# Patient Record
Sex: FEMALE | Race: WHITE | NOT HISPANIC OR LATINO | Employment: FULL TIME
[De-identification: names, ages, dates, MRNs, and addresses within clinical notes are randomized per-mention and may not be internally consistent; named-entity substitution may affect disease eponyms.]

---

## 2017-05-03 ENCOUNTER — CHARTING TRANS (OUTPATIENT)
Dept: OTHER | Age: 62
End: 2017-05-03

## 2017-05-03 ASSESSMENT — PAIN SCALES - GENERAL: PAINLEVEL_OUTOF10: 0

## 2017-12-09 ENCOUNTER — HOSPITAL (OUTPATIENT)
Dept: OTHER | Age: 62
End: 2017-12-09
Attending: EMERGENCY MEDICINE

## 2018-11-03 VITALS
RESPIRATION RATE: 16 BRPM | OXYGEN SATURATION: 99 % | WEIGHT: 127.43 LBS | BODY MASS INDEX: 21.75 KG/M2 | TEMPERATURE: 98.5 F | HEART RATE: 76 BPM | HEIGHT: 64 IN

## 2020-01-01 ENCOUNTER — EXTERNAL RECORD (OUTPATIENT)
Dept: RADIATION ONCOLOGY | Age: 65
End: 2020-01-01

## 2020-01-01 ENCOUNTER — EXTERNAL RECORD (OUTPATIENT)
Dept: INFUSION THERAPY | Age: 65
End: 2020-01-01

## 2020-02-29 ENCOUNTER — WALK IN (OUTPATIENT)
Dept: URGENT CARE | Age: 65
End: 2020-02-29
Attending: EMERGENCY MEDICINE

## 2020-02-29 VITALS
WEIGHT: 136.69 LBS | OXYGEN SATURATION: 98 % | TEMPERATURE: 98.1 F | SYSTOLIC BLOOD PRESSURE: 103 MMHG | RESPIRATION RATE: 16 BRPM | BODY MASS INDEX: 23.46 KG/M2 | HEART RATE: 74 BPM | DIASTOLIC BLOOD PRESSURE: 61 MMHG

## 2020-02-29 DIAGNOSIS — R42 DIZZINESS, NONSPECIFIC: ICD-10-CM

## 2020-02-29 DIAGNOSIS — R50.9 FEVER, UNSPECIFIED FEVER CAUSE: Primary | ICD-10-CM

## 2020-02-29 LAB
FLUAV AG UPPER RESP QL IA.RAPID: NEGATIVE
FLUBV AG UPPER RESP QL IA.RAPID: NEGATIVE

## 2020-02-29 PROCEDURE — 99212 OFFICE O/P EST SF 10 MIN: CPT

## 2020-02-29 PROCEDURE — 87804 INFLUENZA ASSAY W/OPTIC: CPT | Performed by: EMERGENCY MEDICINE

## 2020-02-29 RX ORDER — MECLIZINE HYDROCHLORIDE 25 MG/1
25 TABLET ORAL 3 TIMES DAILY PRN
Qty: 15 TABLET | Refills: 0 | Status: SHIPPED | OUTPATIENT
Start: 2020-02-29 | End: 2020-03-05

## 2020-02-29 SDOH — HEALTH STABILITY: MENTAL HEALTH: HOW OFTEN DO YOU HAVE A DRINK CONTAINING ALCOHOL?: MONTHLY OR LESS

## 2020-06-01 ENCOUNTER — EMPLOYEE HEALTH (OUTPATIENT)
Dept: OTHER | Age: 65
End: 2020-06-01

## 2020-06-01 DIAGNOSIS — Z20.822 SUSPECTED COVID-19 VIRUS INFECTION: Primary | ICD-10-CM

## 2020-06-02 ENCOUNTER — LAB SERVICES (OUTPATIENT)
Dept: LAB | Age: 65
End: 2020-06-02

## 2020-06-02 DIAGNOSIS — Z20.822 SUSPECTED COVID-19 VIRUS INFECTION: ICD-10-CM

## 2020-06-02 PROCEDURE — 87635 SARS-COV-2 COVID-19 AMP PRB: CPT | Performed by: HOSPITALIST

## 2020-06-03 ENCOUNTER — EMPLOYEE HEALTH (OUTPATIENT)
Dept: OTHER | Age: 65
End: 2020-06-03

## 2020-06-03 LAB
SARS-COV-2 RNA SPEC QL NAA+PROBE: NOT DETECTED
SERVICE CMNT-IMP: NORMAL
SPECIMEN SOURCE: NORMAL

## 2020-06-04 ENCOUNTER — EMPLOYEE HEALTH (OUTPATIENT)
Dept: OTHER | Age: 65
End: 2020-06-04

## 2020-07-10 ENCOUNTER — EMPLOYEE HEALTH (OUTPATIENT)
Dept: OTHER | Age: 65
End: 2020-07-10

## 2020-07-10 DIAGNOSIS — Z20.822 SUSPECTED COVID-19 VIRUS INFECTION: Primary | ICD-10-CM

## 2020-07-11 ENCOUNTER — LAB SERVICES (OUTPATIENT)
Dept: LAB | Age: 65
End: 2020-07-11

## 2020-07-11 DIAGNOSIS — Z20.822 SUSPECTED COVID-19 VIRUS INFECTION: ICD-10-CM

## 2020-07-11 PROCEDURE — 87635 SARS-COV-2 COVID-19 AMP PRB: CPT | Performed by: HOSPITALIST

## 2020-07-12 LAB
SARS-COV-2 RNA RESP QL NAA+PROBE: DETECTED
SPECIMEN SOURCE: ABNORMAL

## 2020-07-13 ENCOUNTER — TELEPHONE (OUTPATIENT)
Dept: SCHEDULING | Age: 65
End: 2020-07-13

## 2020-07-13 ENCOUNTER — EMPLOYEE HEALTH (OUTPATIENT)
Dept: OTHER | Age: 65
End: 2020-07-13

## 2020-07-13 ENCOUNTER — V-VISIT (OUTPATIENT)
Dept: FAMILY MEDICINE | Age: 65
End: 2020-07-13

## 2020-07-13 DIAGNOSIS — U07.1 COVID-19 VIRUS INFECTION: Primary | ICD-10-CM

## 2020-07-13 PROBLEM — H53.9 MONOCULAR VISUAL DISTURBANCE: Status: ACTIVE | Noted: 2017-05-03

## 2020-07-13 PROCEDURE — 99213 OFFICE O/P EST LOW 20 MIN: CPT | Performed by: FAMILY MEDICINE

## 2020-07-13 SDOH — HEALTH STABILITY: MENTAL HEALTH: HOW OFTEN DO YOU HAVE A DRINK CONTAINING ALCOHOL?: MONTHLY OR LESS

## 2020-07-13 ASSESSMENT — PATIENT HEALTH QUESTIONNAIRE - PHQ9
SUM OF ALL RESPONSES TO PHQ9 QUESTIONS 1 AND 2: 0
CLINICAL INTERPRETATION OF PHQ9 SCORE: NO FURTHER SCREENING NEEDED
1. LITTLE INTEREST OR PLEASURE IN DOING THINGS: NOT AT ALL
CLINICAL INTERPRETATION OF PHQ2 SCORE: NO FURTHER SCREENING NEEDED
2. FEELING DOWN, DEPRESSED OR HOPELESS: NOT AT ALL
SUM OF ALL RESPONSES TO PHQ9 QUESTIONS 1 AND 2: 0

## 2020-07-13 ASSESSMENT — ENCOUNTER SYMPTOMS
COUGH: 1
ACTIVITY CHANGE: 1
FEVER: 1
DIZZINESS: 1
WEAKNESS: 1
FATIGUE: 1
WHEEZING: 0
EYES NEGATIVE: 1
CHILLS: 0
CHEST TIGHTNESS: 0
SHORTNESS OF BREATH: 0

## 2020-07-15 ENCOUNTER — EMPLOYEE HEALTH (OUTPATIENT)
Dept: OTHER | Age: 65
End: 2020-07-15

## 2020-07-21 ENCOUNTER — EMPLOYEE HEALTH (OUTPATIENT)
Dept: OTHER | Age: 65
End: 2020-07-21

## 2020-07-22 ENCOUNTER — TELEPHONE (OUTPATIENT)
Dept: FAMILY MEDICINE | Age: 65
End: 2020-07-22

## 2020-07-23 ENCOUNTER — OFFICE VISIT (OUTPATIENT)
Dept: FAMILY MEDICINE | Age: 65
End: 2020-07-23

## 2020-07-23 DIAGNOSIS — R51.9 ACUTE NONINTRACTABLE HEADACHE, UNSPECIFIED HEADACHE TYPE: ICD-10-CM

## 2020-07-23 DIAGNOSIS — R42 DIZZINESS: ICD-10-CM

## 2020-07-23 DIAGNOSIS — U07.1 COVID-19 VIRUS INFECTION: Primary | ICD-10-CM

## 2020-07-23 PROCEDURE — 99442 TELEPHONE E&M BY PHYSICIAN EST PT NOT ORIG PREV 7 DAYS 11-20 MIN: CPT | Performed by: FAMILY MEDICINE

## 2020-07-23 ASSESSMENT — ENCOUNTER SYMPTOMS
WEAKNESS: 0
SHORTNESS OF BREATH: 1
DIZZINESS: 1
CHILLS: 0
WHEEZING: 0
CHEST TIGHTNESS: 0
COUGH: 0
FEVER: 1
FATIGUE: 0
ACTIVITY CHANGE: 1
EYES NEGATIVE: 1

## 2020-07-23 ASSESSMENT — PAIN SCALES - GENERAL: PAINLEVEL: 0

## 2020-08-03 ENCOUNTER — TELEPHONE (OUTPATIENT)
Dept: SCHEDULING | Age: 65
End: 2020-08-03

## 2020-08-10 ENCOUNTER — TELEPHONE (OUTPATIENT)
Dept: SCHEDULING | Age: 65
End: 2020-08-10

## 2020-08-12 ENCOUNTER — OFFICE VISIT (OUTPATIENT)
Dept: FAMILY MEDICINE | Age: 65
End: 2020-08-12

## 2020-08-12 DIAGNOSIS — U07.1 COVID-19 VIRUS INFECTION: Primary | ICD-10-CM

## 2020-08-12 DIAGNOSIS — R42 DIZZINESS: ICD-10-CM

## 2020-08-12 PROCEDURE — 99213 OFFICE O/P EST LOW 20 MIN: CPT | Performed by: FAMILY MEDICINE

## 2020-08-12 ASSESSMENT — ENCOUNTER SYMPTOMS
COUGH: 0
FATIGUE: 0
CHEST TIGHTNESS: 0
WHEEZING: 0
SHORTNESS OF BREATH: 1
DIZZINESS: 1
ACTIVITY CHANGE: 1
FEVER: 1
CHILLS: 0
WEAKNESS: 0
EYES NEGATIVE: 1

## 2020-08-17 ENCOUNTER — TELEPHONE (OUTPATIENT)
Dept: SCHEDULING | Age: 65
End: 2020-08-17

## 2020-08-20 ENCOUNTER — TELEPHONE (OUTPATIENT)
Dept: SCHEDULING | Age: 65
End: 2020-08-20

## 2020-08-21 ENCOUNTER — OFFICE VISIT (OUTPATIENT)
Dept: FAMILY MEDICINE | Age: 65
End: 2020-08-21

## 2020-08-21 DIAGNOSIS — R93.89 ABNORMAL CXR (CHEST X-RAY): ICD-10-CM

## 2020-08-21 DIAGNOSIS — R91.8 LUNG MASS: ICD-10-CM

## 2020-08-21 DIAGNOSIS — R42 DIZZINESS: ICD-10-CM

## 2020-08-21 DIAGNOSIS — U07.1 COVID-19 VIRUS INFECTION: Primary | ICD-10-CM

## 2020-08-21 PROCEDURE — 99442 TELEPHONE E&M BY PHYSICIAN EST PT NOT ORIG PREV 7 DAYS 11-20 MIN: CPT | Performed by: FAMILY MEDICINE

## 2020-08-21 ASSESSMENT — ENCOUNTER SYMPTOMS
SPEECH DIFFICULTY: 0
RESPIRATORY NEGATIVE: 1
NUMBNESS: 0
FATIGUE: 1
HEADACHES: 0
EYES NEGATIVE: 1
PSYCHIATRIC NEGATIVE: 1
WEAKNESS: 0
GASTROINTESTINAL NEGATIVE: 1
ACTIVITY CHANGE: 1
SEIZURES: 0
LIGHT-HEADEDNESS: 0
DIZZINESS: 1
TREMORS: 0
FACIAL ASYMMETRY: 0

## 2020-08-25 ENCOUNTER — HOSPITAL ENCOUNTER (OUTPATIENT)
Dept: GENERAL RADIOLOGY | Age: 65
Discharge: HOME | End: 2020-08-25

## 2020-08-25 ENCOUNTER — TELEPHONE (OUTPATIENT)
Dept: SCHEDULING | Age: 65
End: 2020-08-25

## 2020-08-25 ENCOUNTER — LAB SERVICES (OUTPATIENT)
Dept: LAB | Age: 65
End: 2020-08-25

## 2020-08-25 DIAGNOSIS — U07.1 COVID-19 VIRUS INFECTION: ICD-10-CM

## 2020-08-25 DIAGNOSIS — R42 DIZZINESS: ICD-10-CM

## 2020-08-25 LAB
25(OH)D3+25(OH)D2 SERPL-MCNC: 108.5 NG/ML (ref 30–100)
ALBUMIN SERPL-MCNC: 3.8 G/DL (ref 3.6–5.1)
ALBUMIN/GLOB SERPL: 1.3 {RATIO} (ref 1–2.4)
ALP SERPL-CCNC: 75 UNITS/L (ref 45–117)
ALT SERPL-CCNC: 19 UNITS/L
ANION GAP SERPL CALC-SCNC: 9 MMOL/L (ref 10–20)
AST SERPL-CCNC: 18 UNITS/L
BASOPHILS # BLD: 0.1 K/MCL (ref 0–0.3)
BASOPHILS NFR BLD: 1 %
BILIRUB SERPL-MCNC: 0.5 MG/DL (ref 0.2–1)
BUN SERPL-MCNC: 16 MG/DL (ref 6–20)
BUN/CREAT SERPL: 28 (ref 7–25)
CALCIUM SERPL-MCNC: 9.3 MG/DL (ref 8.4–10.2)
CHLORIDE SERPL-SCNC: 108 MMOL/L (ref 98–107)
CO2 SERPL-SCNC: 32 MMOL/L (ref 21–32)
CREAT SERPL-MCNC: 0.58 MG/DL (ref 0.51–0.95)
DIFFERENTIAL METHOD BLD: ABNORMAL
EOSINOPHIL # BLD: 0.1 K/MCL (ref 0.1–0.5)
EOSINOPHIL NFR BLD: 2 %
ERYTHROCYTE [DISTWIDTH] IN BLOOD: 13.8 % (ref 11–15)
GLOBULIN SER-MCNC: 3 G/DL (ref 2–4)
GLUCOSE SERPL-MCNC: 81 MG/DL (ref 65–99)
HCT VFR BLD CALC: 42.5 % (ref 36–46.5)
HGB BLD-MCNC: 13.1 G/DL (ref 12–15.5)
IMM GRANULOCYTES # BLD AUTO: 0 K/MCL (ref 0–0.2)
IMM GRANULOCYTES NFR BLD: 0 %
LENGTH OF FAST TIME PATIENT: 2 HRS
LYMPHOCYTES # BLD: 1.5 K/MCL (ref 1–4)
LYMPHOCYTES NFR BLD: 22 %
MCH RBC QN AUTO: 30.5 PG (ref 26–34)
MCHC RBC AUTO-ENTMCNC: 30.8 G/DL (ref 32–36.5)
MCV RBC AUTO: 99.1 FL (ref 78–100)
MONOCYTES # BLD: 0.7 K/MCL (ref 0.3–0.9)
MONOCYTES NFR BLD: 10 %
NEUTROPHILS # BLD: 4.3 K/MCL (ref 1.8–7.7)
NEUTROPHILS NFR BLD: 65 %
NRBC BLD MANUAL-RTO: 0 /100 WBC
PLATELET # BLD: 241 K/MCL (ref 140–450)
POTASSIUM SERPL-SCNC: 4.9 MMOL/L (ref 3.4–5.1)
PROT SERPL-MCNC: 6.8 G/DL (ref 6.4–8.2)
RBC # BLD: 4.29 MIL/MCL (ref 4–5.2)
SODIUM SERPL-SCNC: 144 MMOL/L (ref 135–145)
TSH SERPL-ACNC: 3.03 MCUNITS/ML (ref 0.35–5)
WBC # BLD: 6.6 K/MCL (ref 4.2–11)

## 2020-08-25 PROCEDURE — 80050 GENERAL HEALTH PANEL: CPT | Performed by: FAMILY MEDICINE

## 2020-08-25 PROCEDURE — 82306 VITAMIN D 25 HYDROXY: CPT | Performed by: FAMILY MEDICINE

## 2020-08-25 PROCEDURE — 36415 COLL VENOUS BLD VENIPUNCTURE: CPT | Performed by: FAMILY MEDICINE

## 2020-08-25 PROCEDURE — 71046 X-RAY EXAM CHEST 2 VIEWS: CPT

## 2020-08-26 ENCOUNTER — TELEPHONE (OUTPATIENT)
Dept: SCHEDULING | Age: 65
End: 2020-08-26

## 2020-08-26 ENCOUNTER — TELEPHONE (OUTPATIENT)
Dept: FAMILY MEDICINE | Age: 65
End: 2020-08-26

## 2020-08-26 DIAGNOSIS — R93.89 ABNORMAL CT OF THE CHEST: ICD-10-CM

## 2020-08-26 DIAGNOSIS — R91.8 LUNG MASS: Primary | ICD-10-CM

## 2020-09-04 ENCOUNTER — HOSPITAL ENCOUNTER (OUTPATIENT)
Dept: CARDIOLOGY | Age: 65
Discharge: HOME | End: 2020-09-04
Attending: FAMILY MEDICINE

## 2020-09-04 DIAGNOSIS — R42 DIZZINESS: ICD-10-CM

## 2020-09-04 DIAGNOSIS — U07.1 COVID-19 VIRUS INFECTION: ICD-10-CM

## 2020-09-04 PROCEDURE — 93306 TTE W/DOPPLER COMPLETE: CPT | Performed by: INTERNAL MEDICINE

## 2020-09-04 PROCEDURE — 93306 TTE W/DOPPLER COMPLETE: CPT

## 2020-09-05 ENCOUNTER — HOSPITAL ENCOUNTER (OUTPATIENT)
Dept: CT IMAGING | Age: 65
Discharge: HOME | End: 2020-09-05
Attending: FAMILY MEDICINE

## 2020-09-05 DIAGNOSIS — R93.89 ABNORMAL CXR (CHEST X-RAY): ICD-10-CM

## 2020-09-05 DIAGNOSIS — R91.8 LUNG MASS: ICD-10-CM

## 2020-09-05 PROCEDURE — 10002805 HB CONTRAST AGENT: Performed by: FAMILY MEDICINE

## 2020-09-05 PROCEDURE — 71260 CT THORAX DX C+: CPT

## 2020-09-05 RX ADMIN — IOHEXOL 100 ML: 350 INJECTION, SOLUTION INTRAVENOUS at 09:30

## 2020-09-08 ENCOUNTER — TELEPHONE (OUTPATIENT)
Dept: SCHEDULING | Age: 65
End: 2020-09-08

## 2020-09-08 ENCOUNTER — TELEPHONE (OUTPATIENT)
Dept: FAMILY MEDICINE | Age: 65
End: 2020-09-08

## 2020-09-15 ENCOUNTER — PRIOR ORIGINAL RECORDS (OUTPATIENT)
Dept: OTHER | Age: 65
End: 2020-09-15

## 2020-09-15 PROCEDURE — 99245 OFF/OP CONSLTJ NEW/EST HI 55: CPT | Performed by: INTERNAL MEDICINE

## 2020-09-22 ENCOUNTER — HOSPITAL ENCOUNTER (OUTPATIENT)
Dept: PET IMAGING | Age: 65
Discharge: HOME | End: 2020-09-22
Attending: INTERNAL MEDICINE

## 2020-09-22 DIAGNOSIS — R91.8 ABNORMAL FINDINGS ON DIAGNOSTIC IMAGING OF LUNG: ICD-10-CM

## 2020-09-22 PROCEDURE — 78815 PET IMAGE W/CT SKULL-THIGH: CPT

## 2020-09-22 PROCEDURE — 10006150 HB RX 343

## 2020-09-22 PROCEDURE — A9552 F18 FDG: HCPCS

## 2020-09-22 RX ORDER — FLUDEOXYGLUCOSE F-18 300 MCI/ML
12.25 INJECTION INTRAVENOUS ONCE
Status: COMPLETED | OUTPATIENT
Start: 2020-09-22 | End: 2020-09-22

## 2020-09-22 RX ADMIN — FLUDEOXYGLUCOSE F-18 12.25 MILLICURIE: 300 INJECTION INTRAVENOUS at 17:00

## 2020-09-30 RX ORDER — MULTIVIT-MIN/IRON FUM/FOLIC AC 7.5 MG-4
1 TABLET ORAL DAILY
COMMUNITY

## 2020-10-01 ENCOUNTER — APPOINTMENT (OUTPATIENT)
Dept: CARDIOLOGY | Age: 65
End: 2020-10-01
Attending: FAMILY MEDICINE

## 2020-10-03 ENCOUNTER — APPOINTMENT (OUTPATIENT)
Dept: LAB | Age: 65
End: 2020-10-03
Attending: INTERNAL MEDICINE
Payer: COMMERCIAL

## 2020-10-03 DIAGNOSIS — Z11.59 ENCOUNTER FOR SCREENING FOR OTHER VIRAL DISEASES: ICD-10-CM

## 2020-10-03 DIAGNOSIS — Z01.818 OTHER SPECIFIED PRE-OPERATIVE EXAMINATION: ICD-10-CM

## 2020-10-06 ENCOUNTER — HOSPITAL ENCOUNTER (INPATIENT)
Dept: CT IMAGING | Facility: HOSPITAL | Age: 65
LOS: 1 days | Discharge: HOME | DRG: 181 | End: 2020-10-07
Attending: INTERNAL MEDICINE | Admitting: HOSPITALIST
Payer: COMMERCIAL

## 2020-10-06 ENCOUNTER — HOSPITAL ENCOUNTER (OUTPATIENT)
Dept: CT IMAGING | Facility: HOSPITAL | Age: 65
Discharge: HOME | DRG: 181 | End: 2020-10-06
Attending: RADIOLOGY
Payer: COMMERCIAL

## 2020-10-06 ENCOUNTER — APPOINTMENT (OUTPATIENT)
Dept: LAB | Facility: HOSPITAL | Age: 65
DRG: 181 | End: 2020-10-06
Attending: INTERNAL MEDICINE
Payer: COMMERCIAL

## 2020-10-06 ENCOUNTER — HOSPITAL ENCOUNTER (OUTPATIENT)
Dept: GENERAL RADIOLOGY | Facility: HOSPITAL | Age: 65
Discharge: HOME | DRG: 181 | End: 2020-10-06
Attending: RADIOLOGY
Payer: COMMERCIAL

## 2020-10-06 VITALS
SYSTOLIC BLOOD PRESSURE: 109 MMHG | RESPIRATION RATE: 17 BRPM | DIASTOLIC BLOOD PRESSURE: 56 MMHG | TEMPERATURE: 99 F | HEART RATE: 73 BPM | OXYGEN SATURATION: 100 %

## 2020-10-06 DIAGNOSIS — R59.0 LOCALIZED ENLARGED LYMPH NODES: ICD-10-CM

## 2020-10-06 DIAGNOSIS — R59.1 LYMPHADENOPATHY: Primary | ICD-10-CM

## 2020-10-06 DIAGNOSIS — C34.90 LUNG CANCER (HCC): ICD-10-CM

## 2020-10-06 DIAGNOSIS — R59.1 LYMPHADENOPATHY: ICD-10-CM

## 2020-10-06 PROBLEM — J93.9 PNEUMOTHORAX: Status: ACTIVE | Noted: 2020-10-06

## 2020-10-06 PROCEDURE — 0BBC3ZX EXCISION OF RIGHT UPPER LUNG LOBE, PERCUTANEOUS APPROACH, DIAGNOSTIC: ICD-10-PCS | Performed by: RADIOLOGY

## 2020-10-06 PROCEDURE — 85610 PROTHROMBIN TIME: CPT

## 2020-10-06 PROCEDURE — 71045 X-RAY EXAM CHEST 1 VIEW: CPT | Performed by: RADIOLOGY

## 2020-10-06 PROCEDURE — 99223 1ST HOSP IP/OBS HIGH 75: CPT | Performed by: INTERNAL MEDICINE

## 2020-10-06 PROCEDURE — 0W9930Z DRAINAGE OF RIGHT PLEURAL CAVITY WITH DRAINAGE DEVICE, PERCUTANEOUS APPROACH: ICD-10-PCS | Performed by: RADIOLOGY

## 2020-10-06 PROCEDURE — 99152 MOD SED SAME PHYS/QHP 5/>YRS: CPT | Performed by: RADIOLOGY

## 2020-10-06 PROCEDURE — 85027 COMPLETE CBC AUTOMATED: CPT

## 2020-10-06 PROCEDURE — 32557 INSERT CATH PLEURA W/ IMAGE: CPT | Performed by: RADIOLOGY

## 2020-10-06 RX ORDER — SODIUM CHLORIDE 9 MG/ML
INJECTION, SOLUTION INTRAVENOUS CONTINUOUS
Status: DISCONTINUED | OUTPATIENT
Start: 2020-10-06 | End: 2020-10-07

## 2020-10-06 RX ORDER — HYDROCODONE BITARTRATE AND ACETAMINOPHEN 5; 325 MG/1; MG/1
2 TABLET ORAL EVERY 4 HOURS PRN
Status: DISCONTINUED | OUTPATIENT
Start: 2020-10-06 | End: 2020-10-07

## 2020-10-06 RX ORDER — SODIUM CHLORIDE 9 MG/ML
INJECTION, SOLUTION INTRAVENOUS CONTINUOUS
Status: ACTIVE | OUTPATIENT
Start: 2020-10-06 | End: 2020-10-07

## 2020-10-06 RX ORDER — NALOXONE HYDROCHLORIDE 0.4 MG/ML
80 INJECTION, SOLUTION INTRAMUSCULAR; INTRAVENOUS; SUBCUTANEOUS AS NEEDED
Status: CANCELLED | OUTPATIENT
Start: 2020-10-06

## 2020-10-06 RX ORDER — SODIUM CHLORIDE 9 MG/ML
INJECTION, SOLUTION INTRAVENOUS CONTINUOUS
Status: DISCONTINUED | OUTPATIENT
Start: 2020-10-06 | End: 2020-10-06 | Stop reason: HOSPADM

## 2020-10-06 RX ORDER — SODIUM CHLORIDE 9 MG/ML
INJECTION, SOLUTION INTRAVENOUS CONTINUOUS
Status: CANCELLED | OUTPATIENT
Start: 2020-10-06

## 2020-10-06 RX ORDER — HYDROCODONE BITARTRATE AND ACETAMINOPHEN 5; 325 MG/1; MG/1
1 TABLET ORAL EVERY 4 HOURS PRN
Status: DISCONTINUED | OUTPATIENT
Start: 2020-10-06 | End: 2020-10-07

## 2020-10-06 RX ORDER — MIDAZOLAM HYDROCHLORIDE 1 MG/ML
INJECTION INTRAMUSCULAR; INTRAVENOUS
Status: COMPLETED
Start: 2020-10-06 | End: 2020-10-06

## 2020-10-06 RX ORDER — MORPHINE SULFATE 2 MG/ML
1 INJECTION, SOLUTION INTRAMUSCULAR; INTRAVENOUS EVERY 2 HOUR PRN
Status: DISCONTINUED | OUTPATIENT
Start: 2020-10-06 | End: 2020-10-07

## 2020-10-06 RX ORDER — NALOXONE HYDROCHLORIDE 0.4 MG/ML
80 INJECTION, SOLUTION INTRAMUSCULAR; INTRAVENOUS; SUBCUTANEOUS AS NEEDED
Status: DISCONTINUED | OUTPATIENT
Start: 2020-10-06 | End: 2020-10-06 | Stop reason: HOSPADM

## 2020-10-06 RX ORDER — MIDAZOLAM HYDROCHLORIDE 1 MG/ML
1 INJECTION INTRAMUSCULAR; INTRAVENOUS EVERY 5 MIN PRN
Status: ACTIVE | OUTPATIENT
Start: 2020-10-06 | End: 2020-10-06

## 2020-10-06 RX ORDER — MIDAZOLAM HYDROCHLORIDE 1 MG/ML
1 INJECTION INTRAMUSCULAR; INTRAVENOUS EVERY 5 MIN PRN
Status: CANCELLED | OUTPATIENT
Start: 2020-10-06 | End: 2020-10-06

## 2020-10-06 RX ORDER — MIDAZOLAM HYDROCHLORIDE 1 MG/ML
INJECTION INTRAMUSCULAR; INTRAVENOUS
Status: DISCONTINUED
Start: 2020-10-06 | End: 2020-10-06

## 2020-10-06 RX ORDER — MORPHINE SULFATE 2 MG/ML
2 INJECTION, SOLUTION INTRAMUSCULAR; INTRAVENOUS EVERY 2 HOUR PRN
Status: DISCONTINUED | OUTPATIENT
Start: 2020-10-06 | End: 2020-10-07

## 2020-10-06 RX ORDER — ENOXAPARIN SODIUM 100 MG/ML
40 INJECTION SUBCUTANEOUS NIGHTLY
Status: DISCONTINUED | OUTPATIENT
Start: 2020-10-06 | End: 2020-10-07

## 2020-10-06 RX ORDER — FLUMAZENIL 0.1 MG/ML
0.2 INJECTION, SOLUTION INTRAVENOUS AS NEEDED
Status: DISCONTINUED | OUTPATIENT
Start: 2020-10-06 | End: 2020-10-06 | Stop reason: HOSPADM

## 2020-10-06 RX ORDER — HYDROCODONE BITARTRATE AND ACETAMINOPHEN 5; 325 MG/1; MG/1
TABLET ORAL
Status: COMPLETED
Start: 2020-10-06 | End: 2020-10-06

## 2020-10-06 RX ORDER — FLUMAZENIL 0.1 MG/ML
0.2 INJECTION, SOLUTION INTRAVENOUS AS NEEDED
Status: DISCONTINUED | OUTPATIENT
Start: 2020-10-06 | End: 2020-10-07

## 2020-10-06 RX ORDER — ONDANSETRON 2 MG/ML
4 INJECTION INTRAMUSCULAR; INTRAVENOUS EVERY 6 HOURS PRN
Status: DISCONTINUED | OUTPATIENT
Start: 2020-10-06 | End: 2020-10-07

## 2020-10-06 RX ORDER — NALOXONE HYDROCHLORIDE 0.4 MG/ML
80 INJECTION, SOLUTION INTRAMUSCULAR; INTRAVENOUS; SUBCUTANEOUS AS NEEDED
Status: DISCONTINUED | OUTPATIENT
Start: 2020-10-06 | End: 2020-10-07

## 2020-10-06 RX ORDER — FLUMAZENIL 0.1 MG/ML
0.2 INJECTION, SOLUTION INTRAVENOUS AS NEEDED
Status: CANCELLED | OUTPATIENT
Start: 2020-10-06

## 2020-10-06 RX ORDER — ACETAMINOPHEN 325 MG/1
650 TABLET ORAL EVERY 4 HOURS PRN
Status: DISCONTINUED | OUTPATIENT
Start: 2020-10-06 | End: 2020-10-07

## 2020-10-06 RX ORDER — MORPHINE SULFATE 4 MG/ML
4 INJECTION, SOLUTION INTRAMUSCULAR; INTRAVENOUS EVERY 2 HOUR PRN
Status: DISCONTINUED | OUTPATIENT
Start: 2020-10-06 | End: 2020-10-07

## 2020-10-06 RX ORDER — HYDROCODONE BITARTRATE AND ACETAMINOPHEN 5; 325 MG/1; MG/1
1 TABLET ORAL ONCE
Status: COMPLETED | OUTPATIENT
Start: 2020-10-06 | End: 2020-10-06

## 2020-10-06 RX ADMIN — MORPHINE SULFATE 2 MG: 2 INJECTION, SOLUTION INTRAMUSCULAR; INTRAVENOUS at 20:13:00

## 2020-10-06 RX ADMIN — MIDAZOLAM HYDROCHLORIDE 1 MG: 1 INJECTION INTRAMUSCULAR; INTRAVENOUS at 13:43:00

## 2020-10-06 RX ADMIN — HYDROCODONE BITARTRATE AND ACETAMINOPHEN 1 TABLET: 5; 325 TABLET ORAL at 14:15:00

## 2020-10-06 RX ADMIN — SODIUM CHLORIDE: 9 INJECTION, SOLUTION INTRAVENOUS at 13:39:00

## 2020-10-06 RX ADMIN — MORPHINE SULFATE 2 MG: 2 INJECTION, SOLUTION INTRAMUSCULAR; INTRAVENOUS at 17:08:00

## 2020-10-06 RX ADMIN — SODIUM CHLORIDE: 9 INJECTION, SOLUTION INTRAVENOUS at 17:50:00

## 2020-10-06 RX ADMIN — SODIUM CHLORIDE: 9 INJECTION, SOLUTION INTRAVENOUS at 09:35:00

## 2020-10-06 RX ADMIN — MIDAZOLAM HYDROCHLORIDE 0.5 MG: 1 INJECTION INTRAMUSCULAR; INTRAVENOUS at 09:45:00

## 2020-10-06 RX ADMIN — ENOXAPARIN SODIUM 40 MG: 100 INJECTION SUBCUTANEOUS at 20:04:00

## 2020-10-06 NOTE — OR NURSING
Patient denies SOB, vital signs stable, complains of minimal pain to biopsy site. Dr. Valero O'Donnell updated on patients condition as well as xray results. Per Dr. Valero O'Donnell, patient to have chest tube placed in IR. Patient and  updated on plan of care.

## 2020-10-06 NOTE — CONSULTS
Pulmonary H&P/Consult     NAME: Norman Collins St: 4409/4413-S - MRN: [id number] - Age: 60year old - :  1955    Date of Admission: 10/6/2020  3:44 PM  Admission Diagnosis: Localized enlarged lymph nodes [R59.0]    Assessment/Plan:  1.  Pneum HPI    OBJECTIVE:    Intake/Output Summary (Last 24 hours) at 10/6/2020 1730  Last data filed at 10/6/2020 1259  Gross per 24 hour   Intake 900 ml   Output —   Net 900 ml     /57 (BP Location: Right arm)   Pulse 69   Temp 99 °F (37.2 °C) (Oral)   Res

## 2020-10-06 NOTE — H&P
[facility] Patient Status:  Outpatient    1955 MRN [id number]   [facility] Attending Chrissy Dukes MD   Hosp Day # 0 PCP Tangela Carney MD     Admitting Diagnosis:   60year-old RBC 4.59   HGB 14.3   HCT 43.9   MCV 95.6   MCH 31.2   MCHC 32.6   RDW 13.5   WBC 6.9   .0     Recent Labs   Lab 10/06/20  0742   PTP 14.1   INR 1.06     No results for input(s): GLU, BUN, CREATSERUM, GFRAA, GFRNAA, CA, NA, K, CL, CO2 in the last

## 2020-10-06 NOTE — PROCEDURES
[facility]  Procedure Note    Leigh Calderon Patient Status:  Outpatient    1955 MRN [id number]   [facility] CT Attending Pippa Thomas MD   Hosp Day # 0 PCP Stephan Ortiz MD     Procedure: Right chest tube placement    P

## 2020-10-06 NOTE — IMAGING NOTE
Pt awake and  Alert. Tolerated the procedure. Vitals documented. Placed patient  2 l Dentures sent with patient to [facility]. Report  Given to [facility]. Patient being transferred to [address] [place] [address] by [facility].

## 2020-10-06 NOTE — PROCEDURES
[facility]  Procedure Note    Ora Leyden Patient Status:  Outpatient    1955 MRN [id number]   [facility] CT Attending aPolo Forman MD   Hosp Day # 0 PCP Mariann Lopez MD     Procedure: Right upper lobe mass biopsy

## 2020-10-06 NOTE — H&P
[facility]  History and Physical     [facility] Patient Status:  Inpatient    1955 MRN [id number]   [facility] 7NE-A Attending Antony Roman MD   Hosp Day # 0 PCP Delfin Lennox, MD     Chief Complaint: pneumoth OR), Take by mouth., Disp: , Rfl:     •  Nettle, Urtica Dioica, (NETTLE LEAF OR), Take by mouth., Disp: , Rfl:     •  TURMERIC OR, Take by mouth., Disp: , Rfl:         Review of Systems:   A comprehensive 14 point review of systems was completed.     Pertin 1. Await pathology   2. PET scan neg for metastatic dz  3. Palpitation   1. Tele   2. TSH   3. IVF  4. Hg  5. Recent echo unremarkable   6. May need cardiology eval as outpt for holter monitor   4. Hx of tobacco use - quit 10 yrs ago   5.  Hx of COVID PNA

## 2020-10-06 NOTE — PLAN OF CARE
NURSING ADMISSION NOTE      Patient admitted via Cart  Oriented to room. Safety precautions initiated. Bed in low position. Call light in reach.     Received patient A/Ox4, 2L nasal cannula, NSR on tele at 1545  Admission navigator completed  Chest t dressing/incision, wound bed, drain sites and surrounding tissue  - Implement wound care per orders  - Initiate isolation precautions as appropriate  - Initiate Pressure Ulcer prevention bundle as indicated  Outcome: Progressing     Problem: PAIN - ADULT

## 2020-10-06 NOTE — IMAGING NOTE
Patient post chest tube placement. Tolerated procedural sedation well. Waiting for transport to assigned bed and patient developed pain. Patient reports moderate pain, tearful. Discussed with DR Sanchez. Orders received and carried out.  1 tablet of PO nor

## 2020-10-07 ENCOUNTER — APPOINTMENT (OUTPATIENT)
Dept: GENERAL RADIOLOGY | Facility: HOSPITAL | Age: 65
DRG: 181 | End: 2020-10-07
Attending: INTERNAL MEDICINE
Payer: COMMERCIAL

## 2020-10-07 ENCOUNTER — APPOINTMENT (OUTPATIENT)
Dept: GENERAL RADIOLOGY | Facility: HOSPITAL | Age: 65
DRG: 181 | End: 2020-10-07
Attending: NURSE PRACTITIONER
Payer: COMMERCIAL

## 2020-10-07 ENCOUNTER — APPOINTMENT (OUTPATIENT)
Dept: GENERAL RADIOLOGY | Facility: HOSPITAL | Age: 65
DRG: 181 | End: 2020-10-07
Attending: RADIOLOGY
Payer: COMMERCIAL

## 2020-10-07 VITALS
OXYGEN SATURATION: 98 % | RESPIRATION RATE: 20 BRPM | DIASTOLIC BLOOD PRESSURE: 79 MMHG | HEIGHT: 64 IN | WEIGHT: 121 LBS | BODY MASS INDEX: 20.66 KG/M2 | HEART RATE: 88 BPM | TEMPERATURE: 98 F | SYSTOLIC BLOOD PRESSURE: 122 MMHG

## 2020-10-07 PROCEDURE — 71045 X-RAY EXAM CHEST 1 VIEW: CPT | Performed by: NURSE PRACTITIONER

## 2020-10-07 PROCEDURE — 71045 X-RAY EXAM CHEST 1 VIEW: CPT | Performed by: RADIOLOGY

## 2020-10-07 PROCEDURE — 71045 X-RAY EXAM CHEST 1 VIEW: CPT | Performed by: INTERNAL MEDICINE

## 2020-10-07 PROCEDURE — 99238 HOSP IP/OBS DSCHRG MGMT 30/<: CPT | Performed by: INTERNAL MEDICINE

## 2020-10-07 RX ORDER — HYDROCODONE BITARTRATE AND ACETAMINOPHEN 5; 325 MG/1; MG/1
1 TABLET ORAL EVERY 4 HOURS PRN
Qty: 10 TABLET | Refills: 0 | Status: SHIPPED | OUTPATIENT
Start: 2020-10-07 | End: 2020-10-20 | Stop reason: ALTCHOICE

## 2020-10-07 RX ORDER — BUTALBITAL, ACETAMINOPHEN AND CAFFEINE 50; 325; 40 MG/1; MG/1; MG/1
1 TABLET ORAL EVERY 4 HOURS PRN
Status: DISCONTINUED | OUTPATIENT
Start: 2020-10-07 | End: 2020-10-07

## 2020-10-07 RX ADMIN — MORPHINE SULFATE 2 MG: 2 INJECTION, SOLUTION INTRAMUSCULAR; INTRAVENOUS at 01:44:00

## 2020-10-07 RX ADMIN — MORPHINE SULFATE 2 MG: 2 INJECTION, SOLUTION INTRAMUSCULAR; INTRAVENOUS at 05:12:00

## 2020-10-07 RX ADMIN — HYDROCODONE BITARTRATE AND ACETAMINOPHEN 1 TABLET: 5; 325 TABLET ORAL at 16:26:00

## 2020-10-07 NOTE — PROGRESS NOTES
Pulmonary Progress Note      NAME: Lito Bowden - ROOM: 5177/8158-L - MRN: [id number] - Age: 60year old - : 1955    Assessment/Plan:  1. Pneumothorax  - s/p CT guided biopsy. Now resolved on am CXR after suction.   CT clamped and repeat CXR p I independently visualized all relevant chest imaging in PACS, agree with radiology interpretation except where noted.

## 2020-10-07 NOTE — PROGRESS NOTES
[facility]  Progress Note     Beatriz Monterroso Patient Status:  Inpatient    1955 MRN [id number]   [facility] 7NE-A Attending Mika Dominguez MD   Hosp Day # 1 PCP Maria Conklin MD     Chief Complaint: Postprocedure pneumo pending-will be following up with oncologist PCP  4. No pneumo seen on x-ray post chest tube pull  2. Dizziness-check orthostatics prior to discharge negative orthostatics today  3. Right lung mass awaiting biopsy results  4. Tobacco use former  5.   COV

## 2020-10-07 NOTE — PAYOR COMM NOTE
--------------  ADMISSION REVIEW     Payor: Hank SANDHU #:  [id number]  Authorization Number: [id number]    Admit date: 10/6/20  Admit time: 1544       Admitting Physician: Fredy Ortiz MD  Attending Physician:  Wily Medina MD  Primary Car Social History:  reports that she quit smoking about 10 years ago. She has a 60.00 pack-year smoking history. She has never used smokeless tobacco. She reports previous alcohol use. She reports that she does not use drugs.     Family History: History review 7275 10/06/20  1707   WBC 6.9 10.4   HGB 14.3 13.7   MCV 95.6 96.8   .0 239.0   INR 1.06  --        No results for input(s): GLU, BUN, CREATSERUM, GFRAA, GFRNAA, CA, ALB, NA, K, CL, CO2, ALKPHO, AST, ALT, BILT, TP in the last 168 hours.     CrCl deborah Thank you for allowing me to participate in the care of this patient, please call with any questions.     My Chaves M.D.   Pulmonary and Critical Care Medicine  [facility]     Referring Provider: Dr. Earline Camp  Reason for consult: Pneumothorax    General: No distress  Skin: No rashes, no bruising  Eyes: EOMI, no icterus   Ears, Nose, Throat, Mouth: OP clear, MMM  Neck: Supple, no adenopathy or elevation in JVP  Cardiovascular: RRR, no murmurs or extra heart sounds   Respiratory: CTAB, right chest t Last data filed at 10/7/2020 0500      Gross per 24 hour   Intake 1989.67 ml   Output —   Net 1989.67 ml         Physical Exam:  /60 (BP Location: Right arm)   Pulse 72   Temp 97.7 °F (36.5 °C) (Oral)   Resp 11   Ht 5' 4\" (1.626 m)   Wt 121 lb (54.9 10/7/2020 0144 Given 2 mg Intravenous Shu Mccabe RN    10/6/2020 2013 Given 2 mg Intravenous Shu Mccabe RN    10/6/2020 1708 Given 2 mg Intravenous Wilmer Zarco RN      0.9% NaCl infusion     Date Action Dose Route User    10/6/2020 Fabian Jeffrey

## 2020-10-07 NOTE — PLAN OF CARE
Assumed care of pt @8747. Pt is A/roberto, VSS. SR on tele. Norco given once for pain after removing CT. Xray done post CT removal. No air leak,, no crepitus. CT clamped this AM. No output. All questions addressed. Ambulates ad marium. Will continue to monitor. appropriate  - Initiate Pressure Ulcer prevention bundle as indicated  Outcome: Progressing     Problem: PAIN - ADULT  Goal: Verbalizes/displays adequate comfort level or patient's stated pain goal  Description: INTERVENTIONS:  - Encourage pt to monitor pa

## 2020-10-07 NOTE — PROGRESS NOTES
[facility]  Progress Note      Micheal Carter Patient Status:  Inpatient    1955 MRN [id number]   [facility] 7NE-A Attending Julianna Barba MD   Hosp Day # 1 PCP Cricket Taylor MD       60year-old female with right pneumotho

## 2020-10-07 NOTE — PROGRESS NOTES
Orthostatic BP (-)       10/07/20 1705 10/07/20 1707 10/07/20 1710   Vital Signs   /40 128/77 122/79   BP Location Right arm Right arm Right arm   BP Method Automatic Automatic Automatic   Patient Position Lying Sitting Standing

## 2020-10-07 NOTE — PLAN OF CARE
NURSING DISCHARGE NOTE    Discharged Home via Wheelchair. Accompanied by Spouse and Support staff  Belongings Taken by patient/family. Xray reviewed prior to dc. Flu shot given. Dc instructions reviewed with pt and spouse.  Supply given to change  comfort level or patient's stated pain goal  Description: INTERVENTIONS:  - Encourage pt to monitor pain and request assistance  - Assess pain using appropriate pain scale  - Administer analgesics based on type and severity of pain and evaluate response  -

## 2020-10-07 NOTE — PLAN OF CARE
Assumed care 1900  Patient alert and oriented x4  Morphine given for pain at chest tube site  No air leak, no crepitus noted  Chest tube to suction  Chest x ray this morning awaiting results  Low output- unmeasureable serosanginous  NSR on tele

## 2020-10-08 NOTE — PAYOR COMM NOTE
--------------  DISCHARGE REVIEW    Payor: KIM JUÁREZ  Subscriber #:  [id number]  Authorization Number: [id number]    Admit date: 10/6/20  Admit time:  1551  Discharge Date: 10/7/2020  6:41 PM     Admitting Physician: Lazara Carroll MD  Attending Darren Peña blood pressure readings negative prior to discharge will be following up with PCP and Dr. Morgan Sarabia for her CT results with further recommendations based on findings. .        Lace+ Score: 13  59-90 High Risk  29-58 Medium Risk  0-28   Low Risk        TC NA     Follow-up appointment:   Kyle Golden, [address] [facility] [phone]     Schedule an appointment as soon as possible for a visit  As needed     Wily Kovacs, [address] [facility]  [phone]

## 2020-10-08 NOTE — DISCHARGE SUMMARY
[facility]  DISCHARGE SUMMARY     Aren Robbins Patient Status:  Inpatient    1955 MRN [id number]   [facility] 7NE-A Attending No att. providers found   [address] Day # 1 PCP Mary Leal MD     Date of Admission: 10/6/2020 her CT results with further recommendations based on findings. Nubia So Score: 13  59-90 High Risk  29-58 Medium Risk  0-28   Low Risk         TCM Follow-Up Recommendation:  LACE > 58:  High Risk of readmission after discharge from the hospital.    Proce needed    Darin Apgar, MD  [address] [phone]      As needed for lung issues    Ivonne Fontana MD  [address]  [phone]    Schedule an appointment as soon as possible for a visit

## 2020-10-13 VITALS
BODY MASS INDEX: 21.44 KG/M2 | WEIGHT: 121 LBS | SYSTOLIC BLOOD PRESSURE: 106 MMHG | DIASTOLIC BLOOD PRESSURE: 58 MMHG | HEIGHT: 63 IN

## 2020-10-23 ENCOUNTER — OFFICE VISIT (OUTPATIENT)
Dept: HEMATOLOGY/ONCOLOGY | Facility: HOSPITAL | Age: 65
End: 2020-10-23
Attending: INTERNAL MEDICINE
Payer: COMMERCIAL

## 2020-10-23 VITALS
DIASTOLIC BLOOD PRESSURE: 60 MMHG | BODY MASS INDEX: 21.24 KG/M2 | HEIGHT: 63.23 IN | TEMPERATURE: 98 F | HEART RATE: 82 BPM | OXYGEN SATURATION: 97 % | RESPIRATION RATE: 18 BRPM | WEIGHT: 121.38 LBS | SYSTOLIC BLOOD PRESSURE: 119 MMHG

## 2020-10-23 DIAGNOSIS — R59.0 LYMPHADENOPATHY, MEDIASTINAL: ICD-10-CM

## 2020-10-23 DIAGNOSIS — C34.91 NON-SMALL CELL LUNG CANCER, RIGHT (HCC): ICD-10-CM

## 2020-10-23 DIAGNOSIS — R91.8 MASS OF UPPER LOBE OF RIGHT LUNG: Primary | ICD-10-CM

## 2020-10-23 DIAGNOSIS — R91.8 LEFT LOWER LOBE PULMONARY INFILTRATE: ICD-10-CM

## 2020-10-23 PROCEDURE — 99245 OFF/OP CONSLTJ NEW/EST HI 55: CPT | Performed by: INTERNAL MEDICINE

## 2020-10-25 NOTE — CONSULTS
Cancer Center Report of Consultation    Patient Name: Cahi Godoy   YOB: 1955   Medical Record Number: [id number]   CSN: [id number]   Consulting Physician: eBto Rodríguez M.D.    Referring Physician: Rocio Jaeger    Date of Cons History:  History reviewed. No pertinent family history.     Social History:  Social History    Socioeconomic History      Marital status:       Spouse name: Not on file      Number of children: Not on file      Years of education: Not on file      H Nettle, Urtica Dioica, (NETTLE LEAF OR), Take by mouth., Disp: , Rfl:   •  TURMERIC OR, Take by mouth., Disp: , Rfl:   •  Garlic 2064 MG Oral Cap, Take by mouth., Disp: , Rfl:     Allergies:  No Known Allergies     Review of Systems:    Constitutional Norm Normal - No petechiae or purpura. No tender or palpable lymph nodes in the cervical, supraclavicular, axillary or inguinal area. Respiratory Normal - Lungs are clear to auscultation without rhonchi or wheezing.    Cardiovascular Normal - Regular rate and Latest Ref Range: 2.8 - 4.4 g/dL  3.4   A/G Ratio Latest Ref Range: 1.0 - 2.0   1.0   TOTAL PROTEIN Latest Ref Range: 6.4 - 8.2 g/dL  6.7   Albumin Latest Ref Range: 3.4 - 5.0 g/dL  3.3 (L)   PT Latest Ref Range: 12.4 - 14.6 seconds 14.1    INR Latest Ref lung tumor such as adenocarcinoma in situ.  Inflammation is   possible but considered less likely.  The right could represent the same or inflammation, indeterminate.     3.  Metastatic lymph nodes within the mediastinum and the left lower neck/supraclavicu treatment decisions difficult. In the setting of a recent COVID19 infection, these lesions may be metastases or inflammatory findings. Also in the differential are 2 primary lung cancers.   As some time has passed since the PET scan, will obtain a repeat

## 2020-10-27 ENCOUNTER — HOSPITAL ENCOUNTER (OUTPATIENT)
Dept: MRI IMAGING | Facility: HOSPITAL | Age: 65
Discharge: HOME | End: 2020-10-27
Attending: INTERNAL MEDICINE
Payer: COMMERCIAL

## 2020-10-27 ENCOUNTER — HOSPITAL ENCOUNTER (OUTPATIENT)
Dept: CT IMAGING | Facility: HOSPITAL | Age: 65
Discharge: HOME | End: 2020-10-27
Attending: INTERNAL MEDICINE
Payer: COMMERCIAL

## 2020-10-27 DIAGNOSIS — R91.8 LEFT LOWER LOBE PULMONARY INFILTRATE: ICD-10-CM

## 2020-10-27 DIAGNOSIS — R91.8 MASS OF UPPER LOBE OF RIGHT LUNG: ICD-10-CM

## 2020-10-27 DIAGNOSIS — C34.91 NON-SMALL CELL LUNG CANCER, RIGHT (HCC): ICD-10-CM

## 2020-10-27 DIAGNOSIS — R59.0 LYMPHADENOPATHY, MEDIASTINAL: ICD-10-CM

## 2020-10-27 PROCEDURE — 71260 CT THORAX DX C+: CPT | Performed by: INTERNAL MEDICINE

## 2020-10-27 PROCEDURE — A9575 INJ GADOTERATE MEGLUMI 0.1ML: HCPCS | Performed by: INTERNAL MEDICINE

## 2020-10-27 PROCEDURE — 70553 MRI BRAIN STEM W/O & W/DYE: CPT | Performed by: INTERNAL MEDICINE

## 2020-10-29 ENCOUNTER — SOCIAL WORK SERVICES (OUTPATIENT)
Dept: HEMATOLOGY/ONCOLOGY | Facility: HOSPITAL | Age: 65
End: 2020-10-29

## 2020-10-29 ENCOUNTER — OFFICE VISIT (OUTPATIENT)
Dept: HEMATOLOGY/ONCOLOGY | Age: 65
End: 2020-10-29
Attending: INTERNAL MEDICINE
Payer: COMMERCIAL

## 2020-10-29 VITALS
BODY MASS INDEX: 21.84 KG/M2 | DIASTOLIC BLOOD PRESSURE: 86 MMHG | HEIGHT: 63.23 IN | SYSTOLIC BLOOD PRESSURE: 130 MMHG | TEMPERATURE: 97 F | WEIGHT: 124.81 LBS | OXYGEN SATURATION: 98 % | HEART RATE: 81 BPM | RESPIRATION RATE: 18 BRPM

## 2020-10-29 DIAGNOSIS — C77.9 REGIONAL LYMPH NODE METASTASIS PRESENT (HCC): ICD-10-CM

## 2020-10-29 DIAGNOSIS — C34.91 NON-SMALL CELL CANCER OF RIGHT LUNG (HCC): Primary | ICD-10-CM

## 2020-10-29 DIAGNOSIS — R91.8 MASS OF LEFT LUNG: ICD-10-CM

## 2020-10-29 PROCEDURE — 99214 OFFICE O/P EST MOD 30 MIN: CPT | Performed by: INTERNAL MEDICINE

## 2020-10-29 NOTE — PROGRESS NOTES
met with Patient and  to review FMLA needs. Dr. Dell Motley sent email to Employer about diagnosis (different from [employer] in July) as Patient requested.   completed FMLA for Continuous Leave (only 2weeks as she stated it will

## 2020-10-29 NOTE — PROGRESS NOTES
Cancer Center Progress Note  Patient Name: Rhina Javier   YOB: 1955   Medical Record Number: [id number]   CSN: [id number]   Attending Physician: Venita Mendoza M.D.        Date of Visit: 10/29/2020     Chief Complaint:  Patient pre History:   Procedure Laterality Date   • FRACTURE SURGERY Right     right ankle fracture surgery with pins and plates        Family History:  History reviewed. No pertinent family history.     Social History:  Social History    Socioeconomic History      Ma Take 1 tablet by mouth daily. , Disp: , Rfl:   •  Garlic 6855 MG Oral Cap, Take by mouth., Disp: , Rfl:   •  Brit, Zingiber officinalis, (BRIT ROOT OR), Take by mouth., Disp: , Rfl:   •  Nettle, Urtica Dioica, (NETTLE LEAF OR), Take by mouth., Disp: , R Normal - Non-tender, non-distended, no masses, ascites or hepatosplenomegaly. Extremities Normal - No visible deformities, no cyanosis, clubbing or edema.     Integumentary Normal - No rashes, No Jaundice   Neurologic Normal - No sensory or motor deficit weeks    I spent 25 minutes face to face with the patient. More than 50% of that time was spent counseling the patient and/or on coordination of care. The diagnosis, prognosis, and general treatment was explained to the patient and the family.     Electro

## 2020-11-06 ENCOUNTER — TELEPHONE (OUTPATIENT)
Dept: HEMATOLOGY/ONCOLOGY | Facility: HOSPITAL | Age: 65
End: 2020-11-06

## 2020-11-09 ENCOUNTER — LAB ENCOUNTER (OUTPATIENT)
Dept: LAB | Age: 65
End: 2020-11-09
Attending: INTERNAL MEDICINE
Payer: COMMERCIAL

## 2020-11-09 DIAGNOSIS — R93.89 ABNORMAL CT OF THE CHEST: ICD-10-CM

## 2020-11-10 ENCOUNTER — ANESTHESIA EVENT (OUTPATIENT)
Dept: ENDOSCOPY | Facility: HOSPITAL | Age: 65
DRG: 166 | End: 2020-11-10
Payer: COMMERCIAL

## 2020-11-10 NOTE — PROGRESS NOTES
Viewed by Augusto Nguyen on 11/10/2020  8:21 AM  Written by Isabel Nath MD on 11/10/2020  7:31 AM  Ms. Christie,     Your coronavirus test was negative.      [place] [employer]

## 2020-11-11 ENCOUNTER — APPOINTMENT (OUTPATIENT)
Dept: GENERAL RADIOLOGY | Facility: HOSPITAL | Age: 65
DRG: 166 | End: 2020-11-11
Attending: INTERNAL MEDICINE
Payer: COMMERCIAL

## 2020-11-11 ENCOUNTER — ANESTHESIA (OUTPATIENT)
Dept: ENDOSCOPY | Facility: HOSPITAL | Age: 65
DRG: 166 | End: 2020-11-11
Payer: COMMERCIAL

## 2020-11-11 ENCOUNTER — HOSPITAL ENCOUNTER (INPATIENT)
Facility: HOSPITAL | Age: 65
LOS: 4 days | Discharge: HOME | DRG: 166 | End: 2020-11-15
Attending: INTERNAL MEDICINE | Admitting: INTERNAL MEDICINE
Payer: COMMERCIAL

## 2020-11-11 ENCOUNTER — HOSPITAL ENCOUNTER (OUTPATIENT)
Dept: CT IMAGING | Facility: HOSPITAL | Age: 65
Discharge: HOME | End: 2020-11-11
Attending: INTERNAL MEDICINE
Payer: COMMERCIAL

## 2020-11-11 DIAGNOSIS — R93.89 ABNORMAL CT OF THE CHEST: Primary | ICD-10-CM

## 2020-11-11 DIAGNOSIS — R91.8 LUNG MASS: ICD-10-CM

## 2020-11-11 PROCEDURE — 71045 X-RAY EXAM CHEST 1 VIEW: CPT | Performed by: INTERNAL MEDICINE

## 2020-11-11 PROCEDURE — 71250 CT THORAX DX C-: CPT | Performed by: INTERNAL MEDICINE

## 2020-11-11 PROCEDURE — 07D78ZX EXTRACTION OF THORAX LYMPHATIC, VIA NATURAL OR ARTIFICIAL OPENING ENDOSCOPIC, DIAGNOSTIC: ICD-10-PCS | Performed by: INTERNAL MEDICINE

## 2020-11-11 PROCEDURE — 0BDJ8ZX EXTRACTION OF LEFT LOWER LUNG LOBE, VIA NATURAL OR ARTIFICIAL OPENING ENDOSCOPIC, DIAGNOSTIC: ICD-10-PCS | Performed by: INTERNAL MEDICINE

## 2020-11-11 PROCEDURE — 88341 IMHCHEM/IMCYTCHM EA ADD ANTB: CPT | Performed by: INTERNAL MEDICINE

## 2020-11-11 PROCEDURE — 88172 CYTP DX EVAL FNA 1ST EA SITE: CPT | Performed by: INTERNAL MEDICINE

## 2020-11-11 PROCEDURE — 0W9B30Z DRAINAGE OF LEFT PLEURAL CAVITY WITH DRAINAGE DEVICE, PERCUTANEOUS APPROACH: ICD-10-PCS | Performed by: INTERNAL MEDICINE

## 2020-11-11 PROCEDURE — 88173 CYTOPATH EVAL FNA REPORT: CPT | Performed by: INTERNAL MEDICINE

## 2020-11-11 PROCEDURE — 88342 IMHCHEM/IMCYTCHM 1ST ANTB: CPT | Performed by: INTERNAL MEDICINE

## 2020-11-11 PROCEDURE — 88305 TISSUE EXAM BY PATHOLOGIST: CPT | Performed by: INTERNAL MEDICINE

## 2020-11-11 PROCEDURE — 88104 CYTOPATH FL NONGYN SMEARS: CPT | Performed by: INTERNAL MEDICINE

## 2020-11-11 PROCEDURE — 0B9J8ZX DRAINAGE OF LEFT LOWER LUNG LOBE, VIA NATURAL OR ARTIFICIAL OPENING ENDOSCOPIC, DIAGNOSTIC: ICD-10-PCS | Performed by: INTERNAL MEDICINE

## 2020-11-11 PROCEDURE — 0BBJ8ZX EXCISION OF LEFT LOWER LUNG LOBE, VIA NATURAL OR ARTIFICIAL OPENING ENDOSCOPIC, DIAGNOSTIC: ICD-10-PCS | Performed by: INTERNAL MEDICINE

## 2020-11-11 RX ORDER — METOCLOPRAMIDE HYDROCHLORIDE 5 MG/ML
10 INJECTION INTRAMUSCULAR; INTRAVENOUS AS NEEDED
Status: DISCONTINUED | OUTPATIENT
Start: 2020-11-11 | End: 2020-11-11 | Stop reason: HOSPADM

## 2020-11-11 RX ORDER — ROCURONIUM BROMIDE 10 MG/ML
INJECTION, SOLUTION INTRAVENOUS AS NEEDED
Status: DISCONTINUED | OUTPATIENT
Start: 2020-11-11 | End: 2020-11-11 | Stop reason: SURG

## 2020-11-11 RX ORDER — HYDROCODONE BITARTRATE AND ACETAMINOPHEN 5; 325 MG/1; MG/1
2 TABLET ORAL AS NEEDED
Status: DISCONTINUED | OUTPATIENT
Start: 2020-11-11 | End: 2020-11-11 | Stop reason: HOSPADM

## 2020-11-11 RX ORDER — HYDROMORPHONE HYDROCHLORIDE 1 MG/ML
0.4 INJECTION, SOLUTION INTRAMUSCULAR; INTRAVENOUS; SUBCUTANEOUS EVERY 5 MIN PRN
Status: DISCONTINUED | OUTPATIENT
Start: 2020-11-11 | End: 2020-11-11 | Stop reason: HOSPADM

## 2020-11-11 RX ORDER — ONDANSETRON 2 MG/ML
INJECTION INTRAMUSCULAR; INTRAVENOUS AS NEEDED
Status: DISCONTINUED | OUTPATIENT
Start: 2020-11-11 | End: 2020-11-11 | Stop reason: SURG

## 2020-11-11 RX ORDER — SODIUM CHLORIDE, SODIUM LACTATE, POTASSIUM CHLORIDE, CALCIUM CHLORIDE 600; 310; 30; 20 MG/100ML; MG/100ML; MG/100ML; MG/100ML
INJECTION, SOLUTION INTRAVENOUS CONTINUOUS
Status: DISCONTINUED | OUTPATIENT
Start: 2020-11-11 | End: 2020-11-15

## 2020-11-11 RX ORDER — MORPHINE SULFATE 4 MG/ML
4 INJECTION, SOLUTION INTRAMUSCULAR; INTRAVENOUS EVERY 2 HOUR PRN
Status: DISCONTINUED | OUTPATIENT
Start: 2020-11-11 | End: 2020-11-15

## 2020-11-11 RX ORDER — HYDROCODONE BITARTRATE AND ACETAMINOPHEN 5; 325 MG/1; MG/1
1 TABLET ORAL AS NEEDED
Status: DISCONTINUED | OUTPATIENT
Start: 2020-11-11 | End: 2020-11-11 | Stop reason: HOSPADM

## 2020-11-11 RX ORDER — MORPHINE SULFATE 4 MG/ML
2 INJECTION, SOLUTION INTRAMUSCULAR; INTRAVENOUS ONCE
Status: COMPLETED | OUTPATIENT
Start: 2020-11-11 | End: 2020-11-11

## 2020-11-11 RX ORDER — GLYCOPYRROLATE 0.2 MG/ML
INJECTION, SOLUTION INTRAMUSCULAR; INTRAVENOUS AS NEEDED
Status: DISCONTINUED | OUTPATIENT
Start: 2020-11-11 | End: 2020-11-11 | Stop reason: SURG

## 2020-11-11 RX ORDER — MORPHINE SULFATE 4 MG/ML
INJECTION, SOLUTION INTRAMUSCULAR; INTRAVENOUS
Status: COMPLETED
Start: 2020-11-11 | End: 2020-11-11

## 2020-11-11 RX ORDER — ONDANSETRON 2 MG/ML
4 INJECTION INTRAMUSCULAR; INTRAVENOUS AS NEEDED
Status: DISCONTINUED | OUTPATIENT
Start: 2020-11-11 | End: 2020-11-11 | Stop reason: HOSPADM

## 2020-11-11 RX ORDER — ACETAMINOPHEN 500 MG
500 TABLET ORAL EVERY 6 HOURS PRN
Status: DISCONTINUED | OUTPATIENT
Start: 2020-11-11 | End: 2020-11-15

## 2020-11-11 RX ORDER — ACETAMINOPHEN 500 MG
1000 TABLET ORAL EVERY 6 HOURS PRN
Status: DISCONTINUED | OUTPATIENT
Start: 2020-11-11 | End: 2020-11-15

## 2020-11-11 RX ORDER — HYDROCODONE BITARTRATE AND ACETAMINOPHEN 5; 325 MG/1; MG/1
1 TABLET ORAL EVERY 6 HOURS PRN
Status: DISCONTINUED | OUTPATIENT
Start: 2020-11-11 | End: 2020-11-15

## 2020-11-11 RX ORDER — DEXAMETHASONE SODIUM PHOSPHATE 4 MG/ML
4 VIAL (ML) INJECTION AS NEEDED
Status: DISCONTINUED | OUTPATIENT
Start: 2020-11-11 | End: 2020-11-11 | Stop reason: HOSPADM

## 2020-11-11 RX ORDER — MORPHINE SULFATE 4 MG/ML
2 INJECTION, SOLUTION INTRAMUSCULAR; INTRAVENOUS EVERY 2 HOUR PRN
Status: DISCONTINUED | OUTPATIENT
Start: 2020-11-11 | End: 2020-11-15

## 2020-11-11 RX ORDER — MORPHINE SULFATE 4 MG/ML
1 INJECTION, SOLUTION INTRAMUSCULAR; INTRAVENOUS EVERY 2 HOUR PRN
Status: DISCONTINUED | OUTPATIENT
Start: 2020-11-11 | End: 2020-11-15

## 2020-11-11 RX ORDER — SODIUM CHLORIDE, SODIUM LACTATE, POTASSIUM CHLORIDE, CALCIUM CHLORIDE 600; 310; 30; 20 MG/100ML; MG/100ML; MG/100ML; MG/100ML
INJECTION, SOLUTION INTRAVENOUS CONTINUOUS
Status: DISCONTINUED | OUTPATIENT
Start: 2020-11-11 | End: 2020-11-11 | Stop reason: HOSPADM

## 2020-11-11 RX ORDER — MORPHINE SULFATE 4 MG/ML
1 INJECTION, SOLUTION INTRAMUSCULAR; INTRAVENOUS ONCE
Status: COMPLETED | OUTPATIENT
Start: 2020-11-11 | End: 2020-11-11

## 2020-11-11 RX ORDER — DEXAMETHASONE SODIUM PHOSPHATE 4 MG/ML
VIAL (ML) INJECTION AS NEEDED
Status: DISCONTINUED | OUTPATIENT
Start: 2020-11-11 | End: 2020-11-11 | Stop reason: SURG

## 2020-11-11 RX ORDER — ONDANSETRON 2 MG/ML
4 INJECTION INTRAMUSCULAR; INTRAVENOUS EVERY 6 HOURS PRN
Status: DISCONTINUED | OUTPATIENT
Start: 2020-11-11 | End: 2020-11-15

## 2020-11-11 RX ORDER — NEOSTIGMINE METHYLSULFATE 1 MG/ML
INJECTION INTRAVENOUS AS NEEDED
Status: DISCONTINUED | OUTPATIENT
Start: 2020-11-11 | End: 2020-11-11 | Stop reason: SURG

## 2020-11-11 RX ORDER — NALOXONE HYDROCHLORIDE 0.4 MG/ML
80 INJECTION, SOLUTION INTRAMUSCULAR; INTRAVENOUS; SUBCUTANEOUS AS NEEDED
Status: DISCONTINUED | OUTPATIENT
Start: 2020-11-11 | End: 2020-11-11 | Stop reason: HOSPADM

## 2020-11-11 RX ADMIN — DEXAMETHASONE SODIUM PHOSPHATE 4 MG: 4 MG/ML VIAL (ML) INJECTION at 13:28:00

## 2020-11-11 RX ADMIN — ROCURONIUM BROMIDE 30 MG: 10 INJECTION, SOLUTION INTRAVENOUS at 13:28:00

## 2020-11-11 RX ADMIN — ONDANSETRON 4 MG: 2 INJECTION INTRAMUSCULAR; INTRAVENOUS at 13:28:00

## 2020-11-11 RX ADMIN — ROCURONIUM BROMIDE 5 MG: 10 INJECTION, SOLUTION INTRAVENOUS at 14:00:00

## 2020-11-11 RX ADMIN — SODIUM CHLORIDE, SODIUM LACTATE, POTASSIUM CHLORIDE, CALCIUM CHLORIDE: 600; 310; 30; 20 INJECTION, SOLUTION INTRAVENOUS at 14:59:00

## 2020-11-11 RX ADMIN — GLYCOPYRROLATE 0.4 MG: 0.2 INJECTION, SOLUTION INTRAMUSCULAR; INTRAVENOUS at 14:41:00

## 2020-11-11 RX ADMIN — NEOSTIGMINE METHYLSULFATE 3 MG: 1 INJECTION INTRAVENOUS at 14:41:00

## 2020-11-11 NOTE — ANESTHESIA POSTPROCEDURE EVALUATION
[facility] Francia Hastings Patient Status:  Hospital Outpatient Surgery   Age/Gender 60year old female MRN [id number]   Location [address] [facility] Attending Nikki Das MD   Hosp Day # 0 PCP Roly Monzon MD

## 2020-11-11 NOTE — OPERATIVE REPORT
EBUS Bronchoscopy Procedure Report     Preop diagnosis:        Abnormal CT scan, LLL lung mass, adenopathy    Postop diagnosis:       Abnormal CT scan, LLL lung mass, adenopathy    Bronchoscopist: Joann Jean MD      Procedure(s) performed  1.  Tiffanie Connell A dedicated 22-gauge EBUS-TBNA needle was used to sample the station 4R node. Multiple passes were taken from the node for slide preparation and cell block preparation by the cytopathology technician.   Rapid onsite evaluation by the pathologist revealed th 3. Endobronchial biopsy and TBNA biopsy performed from LLL mass as below  4. BAL performed from left lower lobe       Recommendations: Follow up cytology/surgical pathology results       Shady Nunez M.D.   Pulmonary/Critical Care

## 2020-11-11 NOTE — ANESTHESIA PROCEDURE NOTES
Airway  Date/Time: 11/11/2020 1:29 PM  Urgency: elective      General Information and Staff    Patient location during procedure: OR  Anesthesiologist: Melissa Simpson MD  Performed: anesthesiologist     Indications and Patient Condition  Indications for air

## 2020-11-11 NOTE — ANESTHESIA PREPROCEDURE EVALUATION
PRE-OP EVALUATION    Patient Name: Lito Bowden    Pre-op Diagnosis: ABNORMAL CT OF THE CHEST    Procedure(s):  NAVIGATIONAL BRONCHOSCOPY AND ENDOBRONCHIAL ULTRASOUND      Surgeon(s) and Role:     * Deepa Canada MD - Primary    Pre-op vitals Tab Cyr 10/06/2020    GLU 88 10/06/2020    CA 8.4 (L) 10/06/2020            Airway      Mallampati: III  Mouth opening: 3 FB  TM distance: 4 - 6 cm  Neck ROM: full Cardiovascular    Cardiovascular exam normal.         Dental    No notable dental history.          P

## 2020-11-11 NOTE — H&P
Preprocedure Note    Reviewed note by Dr. Carie Dooley dated 10/30. There has been no interval change. Plan is for bronch/ebus/navigational bronchoscopy    Shashank Quiros M.D.   Pulmonary/Critical Care

## 2020-11-11 NOTE — H&P
[facility] PULMONARY/CRITICAL CARE CONSULTATION       HPI: Augustin Richardson is a 60year old female with a history of recently diagnosed lung cancer who presented for bronchoscopy with biopsy today.  She was found to have a RUL lung cancer diagnosed by CT-FNA of Facility-Administered Medications: None       CURRENT MEDICATIONS        •  lactated ringers infusion, , Intravenous, Continuous    •  morphINE sulfate (PF) 4 MG/ML injection, , ,         ALLERGIES      No Known Allergies    SOCIAL HISTORY      Social Hist History reviewed. No pertinent family history.     REVIEW OF SYSTEMS      10 pt ROS negative except what is mentioned in HPI    OBJECTIVE       11/11/20  1554 11/11/20  1558 11/11/20  1603 11/11/20  1608   BP: 117/60 120/71 138/70 138/76   BP Location:

## 2020-11-12 ENCOUNTER — APPOINTMENT (OUTPATIENT)
Dept: GENERAL RADIOLOGY | Facility: HOSPITAL | Age: 65
DRG: 166 | End: 2020-11-12
Attending: INTERNAL MEDICINE
Payer: COMMERCIAL

## 2020-11-12 ENCOUNTER — APPOINTMENT (OUTPATIENT)
Dept: CT IMAGING | Facility: HOSPITAL | Age: 65
DRG: 166 | End: 2020-11-12
Attending: INTERNAL MEDICINE
Payer: COMMERCIAL

## 2020-11-12 PROCEDURE — 71045 X-RAY EXAM CHEST 1 VIEW: CPT | Performed by: INTERNAL MEDICINE

## 2020-11-12 PROCEDURE — 71250 CT THORAX DX C-: CPT | Performed by: INTERNAL MEDICINE

## 2020-11-12 PROCEDURE — C9113 INJ PANTOPRAZOLE SODIUM, VIA: HCPCS | Performed by: INTERNAL MEDICINE

## 2020-11-12 PROCEDURE — 85027 COMPLETE CBC AUTOMATED: CPT | Performed by: INTERNAL MEDICINE

## 2020-11-12 PROCEDURE — 85610 PROTHROMBIN TIME: CPT | Performed by: INTERNAL MEDICINE

## 2020-11-12 RX ORDER — MIDAZOLAM HYDROCHLORIDE 1 MG/ML
1 INJECTION INTRAMUSCULAR; INTRAVENOUS EVERY 5 MIN PRN
Status: DISCONTINUED | OUTPATIENT
Start: 2020-11-12 | End: 2020-11-12

## 2020-11-12 RX ORDER — NALOXONE HYDROCHLORIDE 0.4 MG/ML
80 INJECTION, SOLUTION INTRAMUSCULAR; INTRAVENOUS; SUBCUTANEOUS AS NEEDED
Status: DISCONTINUED | OUTPATIENT
Start: 2020-11-12 | End: 2020-11-12 | Stop reason: HOSPADM

## 2020-11-12 RX ORDER — MIDAZOLAM HYDROCHLORIDE 1 MG/ML
INJECTION INTRAMUSCULAR; INTRAVENOUS
Status: DISCONTINUED
Start: 2020-11-12 | End: 2020-11-12 | Stop reason: WASHOUT

## 2020-11-12 RX ORDER — SODIUM CHLORIDE 9 MG/ML
INJECTION, SOLUTION INTRAVENOUS CONTINUOUS
Status: DISCONTINUED | OUTPATIENT
Start: 2020-11-12 | End: 2020-11-12 | Stop reason: HOSPADM

## 2020-11-12 RX ORDER — FLUMAZENIL 0.1 MG/ML
0.2 INJECTION, SOLUTION INTRAVENOUS AS NEEDED
Status: DISCONTINUED | OUTPATIENT
Start: 2020-11-12 | End: 2020-11-12 | Stop reason: HOSPADM

## 2020-11-12 RX ORDER — PROCHLORPERAZINE EDISYLATE 5 MG/ML
10 INJECTION INTRAMUSCULAR; INTRAVENOUS EVERY 6 HOURS PRN
Status: DISCONTINUED | OUTPATIENT
Start: 2020-11-12 | End: 2020-11-15

## 2020-11-12 RX ORDER — HYDROMORPHONE HYDROCHLORIDE 1 MG/ML
0.5 INJECTION, SOLUTION INTRAMUSCULAR; INTRAVENOUS; SUBCUTANEOUS ONCE
Status: COMPLETED | OUTPATIENT
Start: 2020-11-12 | End: 2020-11-12

## 2020-11-12 RX ORDER — HYDROMORPHONE HYDROCHLORIDE 1 MG/ML
0.5 INJECTION, SOLUTION INTRAMUSCULAR; INTRAVENOUS; SUBCUTANEOUS EVERY 2 HOUR PRN
Status: DISCONTINUED | OUTPATIENT
Start: 2020-11-12 | End: 2020-11-15

## 2020-11-12 NOTE — PROCEDURES
[facility]    Joleen Phan Patient Status:  Hospital Outpatient Surgery    1955 MRN [id number]   [facility] ENDOSCOPY Attending Antonella Peterson MD   Hosp Day # 0 PCP Ani Camp MD     Chest Tube Insertion    Indicatio

## 2020-11-12 NOTE — PAYOR COMM NOTE
--------------  CONTINUED STAY REVIEW    Payor: KIM JUÁREZ  Subscriber #:  [id number]  Authorization Number: pend ip #[id number]    Admit date: 11/11/20  Admit time: 2208    Admitting Physician: Radha Corral MD  Attending Physician:  Radha Corral oncology  3. N/V  -due to pain and pain meds  -compazine/zofran as noted above  -dark brown color noted w/ emesis this AM  -suspect MW tear  -PPI BID  -check CBC  4.  Nutrition - gen diet    Date/Time Temp Pulse Resp BP SpO2 Weight O2 Device O2 Flow Rate (L Pantoprazole Sodium (PROTONIX) 40 mg in Sodium Chloride (PF) 0.9 % 10 mL IV push     Date Action Dose Route User    11/12/2020 0926 Given 40 mg Intravenous Fransisco Penn RN      Prochlorperazine Edisylate (COMPAZINE) injection 10 mg     Date Action Do

## 2020-11-12 NOTE — PLAN OF CARE
Assumed pt care around 0730. Pt denies CP, SOB, dizziness, or lightheadedness. Pt c/o pain on L side w/ CT and nausea- see emar. CT #1 to waterseal, no airleak noted, CT 2- clamped by Dr. Reza Figueroa this am, O2 sats > 90% on 2 L O2 via NC. CXR as ordered. and social influences on pain and pain management  - Manage/alleviate anxiety  - Utilize distraction and/or relaxation techniques  - Monitor for opioid side effects  - Notify MD/LIP if interventions unsuccessful or patient reports new pain  - Anticipate in

## 2020-11-12 NOTE — PROGRESS NOTES
Pulmonary Progress Note        NAME: Alex Hernandez - ROOM: 4558/3130-E - MRN: [id number] - Age: 60year old - : 1955        Last 24hrs: Events of yesterday reviewed    OBJECTIVE:   20  0300 20  0500 20  0615 20  0832   B as noted above  -dark brown color noted w/ emesis this AM  -suspect MW tear  -PPI BID  -check CBC  4. Nutrition - gen diet  5. Proph -scd  6.  Dispo - full code  -will follow  [facility] Pulmonary and Critical Care

## 2020-11-12 NOTE — PROCEDURES
[facility]    Ora Leyden Patient Status:  Hospital Outpatient Surgery    1955 MRN [id number]   [facility] ENDOSCOPY Attending Janessa Humphreys MD   Hosp Day # 0 PCP Mariann Lopez MD     Chest Tube Insertion    Indicatio

## 2020-11-12 NOTE — PLAN OF CARE
Received patient, alert and oriented but in severe pain with nausea. She barely moves because of pain. Left anterior chest tube to water seal. Left lateral chest tube to suction with intermittent/continuous air leak. Patient on O2 2L/NC.   Morphine and No and evaluate response  - Consider cultural and social influences on pain and pain management  - Manage/alleviate anxiety  - Utilize distraction and/or relaxation techniques  - Monitor for opioid side effects  - Notify MD/LIP if interventions unsuccessful o

## 2020-11-12 NOTE — PAYOR COMM NOTE
--------------  ADMISSION REVIEW     Payor: KIM JUÁREZ  Subscriber #:  [id number]  Authorization Number: pend ip #[id number]    Admit date: 11/11/20  Admit time: 2208       Admitting Physician: Shellee Soulier, MD  Attending Physician:  Shellee Soulier, [phone]   Resp: 16 16 16 16   Temp:       TempSrc:       SpO2: 93% 92% 90% 91%   Weight:       Height:         Oxygen Therapy  SpO2: 91 %  O2 Device: None (Room air)  O2 Flow Rate (L/min): 4 L/min  Pulse Oximetry Type: Continuous      Gen - Alert, Elspeth Farm status post left pigtail catheter placement.   Mediastinal shift towards the right is favored to be slightly decreased from the prior exam.  However there remains at least   moderate left pneumothorax present for which clinical correlation and continued fol Intravenous Christi Green RN    11/11/2020 1944 Given 2 mg Intravenous Denisha Miguel RN    11/11/2020 1925 Given 2 mg Intravenous Jose Bragg RN

## 2020-11-13 ENCOUNTER — APPOINTMENT (OUTPATIENT)
Dept: GENERAL RADIOLOGY | Facility: HOSPITAL | Age: 65
DRG: 166 | End: 2020-11-13
Attending: INTERNAL MEDICINE
Payer: COMMERCIAL

## 2020-11-13 PROCEDURE — 85025 COMPLETE CBC W/AUTO DIFF WBC: CPT | Performed by: INTERNAL MEDICINE

## 2020-11-13 PROCEDURE — 71045 X-RAY EXAM CHEST 1 VIEW: CPT | Performed by: INTERNAL MEDICINE

## 2020-11-13 PROCEDURE — 80048 BASIC METABOLIC PNL TOTAL CA: CPT | Performed by: INTERNAL MEDICINE

## 2020-11-13 PROCEDURE — C9113 INJ PANTOPRAZOLE SODIUM, VIA: HCPCS | Performed by: INTERNAL MEDICINE

## 2020-11-13 RX ORDER — KETOROLAC TROMETHAMINE 15 MG/ML
15 INJECTION, SOLUTION INTRAMUSCULAR; INTRAVENOUS EVERY 6 HOURS PRN
Status: DISPENSED | OUTPATIENT
Start: 2020-11-13 | End: 2020-11-15

## 2020-11-13 NOTE — PLAN OF CARE
AOx4. NSR on cardiac monitor. Adequate saturation on RA. Lung sounds clear. Denies sob, chest discomfort, n/v. Denies pain. Straight cath the pt at [address], pulled 800cc out (second time). LBM 11/11.  Chest tube #1, Anterior chest, no output overnight, with Anticipate increased pain with activity and pre-medicate as appropriate  Outcome: Progressing

## 2020-11-13 NOTE — PLAN OF CARE
Neuro: AOx4  Resp: CTA bilaterally. Denies cough. . 2 L/min NC. Small bore medial chest tube changed from water seal to suction by Dr. Sg Burks and has no output.  Lateral chest tube changed from suction to clamped by Dr. Sg Burks and has a small astrid

## 2020-11-13 NOTE — PROGRESS NOTES
Pulmonary Progress Note        NAME: Rhina Javier - ROOM: 7947/1707-G - MRN: [id number] - Age: 60year old - : 1955        Last 24hrs: No events overnight, yesterday went down for CT guided chest tube but CT noted the large bore to be in an ap disease or synchronous primary. -s/p EBUS and TBBx  -await path results  -outpt f/u with oncology  3.  N/V  -due to pain and pain meds  -compazine/zofran as noted above  -dark brown color noted w/ emesis   -suspect MW tear  -PPI BID  -hgb stable- no need

## 2020-11-13 NOTE — PAYOR COMM NOTE
--------------  CONTINUED STAY REVIEW   11-13-20       Payor: KIM PPO  Subscriber #:  [id number]  Authorization Number: pend ip #[id number]    Admit date: 11/11/20  Admit time: 2208    Admitting Physician: Brandi Infante MD  Attending Physician:  Landon Alfred w/ left lung biopsy  -s/p 2 chest tubes  -large bore tube clamped this AM, repeat x-ray at  1130  -norco/toradol for pain  -compazine/zofran for nausea  2. Lung cancer - diagnosed by CT-FNA of a right lung mass.  Patient also has mediastinal adenopathy and HCl [facility]) injection 4 mg     Date Action Dose Route User    11/12/2020 1623 Given 4 mg Intravenous Gissell Vásquez, RN      Pantoprazole Sodium (PROTONIX) 40 mg in Sodium Chloride (PF) 0.9 % 10 mL IV push     Date Action Dose Route User    11/13/2020 1042

## 2020-11-14 ENCOUNTER — APPOINTMENT (OUTPATIENT)
Dept: GENERAL RADIOLOGY | Facility: HOSPITAL | Age: 65
DRG: 166 | End: 2020-11-14
Attending: INTERNAL MEDICINE
Payer: COMMERCIAL

## 2020-11-14 PROCEDURE — 71045 X-RAY EXAM CHEST 1 VIEW: CPT | Performed by: INTERNAL MEDICINE

## 2020-11-14 PROCEDURE — C9113 INJ PANTOPRAZOLE SODIUM, VIA: HCPCS | Performed by: INTERNAL MEDICINE

## 2020-11-14 NOTE — PROGRESS NOTES
[facility]    Lito Bowden Patient Status:  Inpatient    1955 MRN [id number]   [facility] 8NE-A Attending Deepa Canada MD   Hosp Day # 3 PCP Osborn Schilder, MD     SUBJECTIVE: no new events overnight.   Pt having some pa no cyanosis or edema     Lab Results   Component Value Date    WBC 10.1 11/13/2020    RBC 4.27 11/13/2020    HGB 13.6 11/13/2020    HCT 41.5 11/13/2020    MCV 97.2 11/13/2020    MCH 31.9 11/13/2020    MCHC 32.8 11/13/2020    RDW 13.2 11/13/2020    . follow    Deb Francis MD  11/14/2020  8:56 AM

## 2020-11-14 NOTE — PLAN OF CARE
Assumed care at 1900, pt resting in bed. Denies SOB, O2 sat WNL on 2L. Denies chest pain, NSR on tele. Pain 6/10, IV tramadol given q6hrs. 8F CT inserted in the L upper chest, attached to suction; transparent dressing in place, CDI, no complications.  28F on type and severity of pain and evaluate response  - Implement non-pharmacological measures as appropriate and evaluate response  - Consider cultural and social influences on pain and pain management  - Manage/alleviate anxiety  - Utilize distraction and/

## 2020-11-15 ENCOUNTER — APPOINTMENT (OUTPATIENT)
Dept: GENERAL RADIOLOGY | Facility: HOSPITAL | Age: 65
DRG: 166 | End: 2020-11-15
Attending: INTERNAL MEDICINE
Payer: COMMERCIAL

## 2020-11-15 VITALS
RESPIRATION RATE: 18 BRPM | WEIGHT: 122.38 LBS | SYSTOLIC BLOOD PRESSURE: 110 MMHG | HEART RATE: 86 BPM | DIASTOLIC BLOOD PRESSURE: 64 MMHG | TEMPERATURE: 99 F | HEIGHT: 64 IN | BODY MASS INDEX: 20.89 KG/M2 | OXYGEN SATURATION: 92 %

## 2020-11-15 PROCEDURE — 71045 X-RAY EXAM CHEST 1 VIEW: CPT | Performed by: INTERNAL MEDICINE

## 2020-11-15 PROCEDURE — C9113 INJ PANTOPRAZOLE SODIUM, VIA: HCPCS | Performed by: INTERNAL MEDICINE

## 2020-11-15 NOTE — PLAN OF CARE
Assumed care of pt at 1930 a/o x4 in no apparent distress watching TV. Monitor shows sinus rhythm with stable BP and afebrile. CT to water seal to L lateral chest c/d/I and to pleurovac with minimal drng at present since last shift.   IV site patent witho Notify MD/LIP if interventions unsuccessful or patient reports new pain  - Anticipate increased pain with activity and pre-medicate as appropriate  11/15/2020 0309 by Jalil Savage RN  Outcome: Progressing  11/15/2020 0309 by Jalil Savage RN  Outcome: Leeann

## 2020-11-15 NOTE — PLAN OF CARE
Pt c/o incisional pain on left lateral chest, tx with PRN Toradol, cold packs, and lidocaine patch. A/Ox4. VSS. Afebrile. Lung sounds diminished bilaterally, on RA to 1L O2 as needed.  Left lateral chest tube to water seal. No subcutaneous emphysema noted respiratory difficulty  - Respiratory Therapy support as indicated  - Manage/alleviate anxiety  - Monitor for signs/symptoms of CO2 retention  Outcome: Progressing     Problem: PAIN - ADULT  Goal: Verbalizes/displays adequate comfort level or patient's sta

## 2020-11-15 NOTE — PLAN OF CARE
Port CXR results noted. Ok to remove CT and pt ok for d/c home. PTA meds to be resumed. Discharge instructions given to pt and spouse, understanding verbalizzed. All pt belongings were [facility] with pt[facility]

## 2020-11-15 NOTE — PLAN OF CARE
Pt rec'd awake and alert, lying in bed. Pt reports pain at 3/10, assisited to position change, more comfortable,  (L) CT to water seal, dresssing C/D/I. IV site to (R) wrist easil;y flushes, no redness or swelling noted.   Pt is up to BR wiht SBA for CT ch non-pharmacological measures as appropriate and evaluate response  - Consider cultural and social influences on pain and pain management  - Manage/alleviate anxiety  - Utilize distraction and/or relaxation techniques  - Monitor for opioid side effects  - N

## 2020-11-15 NOTE — PROGRESS NOTES
[facility]    Trinity Mendez Patient Status:  Inpatient    1955 MRN [id number]   [facility] 8NE-A Attending Nikki Das MD   Hosp Day # 4 PCP Roly Monzon MD     SUBJECTIVE: had some pain overnight.   Denies discomfort no cyanosis or edema           Lab Results   Component Value Date    INR 1.09 11/12/2020    INR 1.06 10/06/2020          Imaging: CXR: personally reviewed; 6mm L apical. Improvement. Large bore chest tube is mostly out of hemithorax.      ASSESSMENT/PLAN:

## 2020-11-16 NOTE — PAYOR COMM NOTE
--------------  CONTINUED STAY REVIEW    Payor: [employer]IM [employer]PO  Subscriber #:  [id number]  Authorization Number: pend ip #[id number]    Admit date: 11/11/20  Admit time: 2208    Admitting Physician: Juani Cary MD  Attending Physician:  Lis att. providers •  morphINE sulfate (PF) 4 MG/ML injection 1 mg, 1 mg, Intravenous, Q2H PRN **OR** morphINE sulfate (PF) 4 MG/ML injection 2 mg, 2 mg, Intravenous, Q2H PRN **OR** morphINE sulfate (PF) 4 MG/ML injection 4 mg, 4 mg, Intravenous, Q2H PRN  •  HYDROcodone-acet -large bore tube clamped with slight recurrence of PTX. Small tube on suction but not tidaling at all. Discussed with pt and  at length and reviewed CXRs. It is clear that small bore chest tube did not work adequately.   Unclamped large CT and imm /43 (BP Location: Right arm)   Pulse 80   Temp 97.7 °F (36.5 °C) (Oral)   Resp 16   Ht 5' 4\" (1.626 m)   Wt 122 lb 6.4 oz (55.5 kg)   SpO2 94%   BMI 21.01 kg/m²   O2 requirement: on room air     I/O last 3 completed shifts:   In: 720 [P.O.:720]  Out: 1. Iatrogenic pneumothorax - after bronchoscopy w/ left lung biopsy  -s/p 2 chest tubes- small bore removed yesterday b/c it wasn't workening  -large bore tube now migrated out inadvertently.   Still has intermittent tidaling while on water seal.  Side port

## 2020-11-16 NOTE — PAYOR COMM NOTE
--------------  DISCHARGE REVIEW    Payor: KIM JUÁREZ  Subscriber #:  [id number]  Authorization Number: pend ip #[id number]    Admit date: 11/11/20  Admit time:  2208  Discharge Date: 11/15/2020  5:45 PM     Admitting Physician: Agustina Salazar MD  Atten mouth., Disp: , Rfl:   •  Nettle, Urtica Dioica, (NETTLE LEAF OR), Take by mouth., Disp: , Rfl:   •  TURMERIC OR, Take by mouth., Disp: , Rfl:       Follow Up:  Dr. Anthony Davis 1 week    Sun Keene M.D.   Pulmonary/Critical Care      Electronically signed

## 2020-11-16 NOTE — DISCHARGE SUMMARY
[facility]  Discharge Summary    Date of admission: 11/11/2020    Date of discharge: 11/15/2020    Discharge Disposition: Home    Discharge Diagnosis:  1. Iatrogenic pneumothorax  2.  Lung cancer      History of Present Illness:  Alex Hernandez is a

## 2020-11-18 NOTE — PAYOR COMM NOTE
--------------  DISCHARGE REVIEW-----REQUESTING ADDITIONAL DAY 11/14 WITH DISCHARGE ON 11/15      [facility]  Subscriber #:  [id number]  Authorization Number: pend ip #[id number]    Admit date: 11/11/20  Admit time:  2208  Discharge Date: 11/15/2020 •  HYDROcodone-acetaminophen (NORCO) 5-325 MG per tab 1 tablet, 1 tablet, Oral, Q6H PRN  •  acetaminophen (TYLENOL EXTRA STRENGTH) tab 500 mg, 500 mg, Oral, Q6H PRN **OR** acetaminophen (TYLENOL EXTRA STRENGTH) tab 1,000 mg, 1,000 mg, Oral, Q6H PRN  •  ond - repeat CXR this afternoon while on water seal and again in the morning. Suspect that pt will be in hospital through the weekend.  -norco/toradol for pain  -compazine/zofran for nausea  2. Lung cancer - diagnosed by CT-FNA of a right lung mass.  Patient a Patient was monitored in the hospital with repeat xrays; and was given analgesia for pain. Eventually the two chest tubes were able to be removed. The patient was discharged and advised to f/u in a week.      Discharge Medications:    Current Outpatient Med

## 2020-11-19 ENCOUNTER — TELEPHONE (OUTPATIENT)
Dept: HEMATOLOGY/ONCOLOGY | Facility: HOSPITAL | Age: 65
End: 2020-11-19

## 2020-11-19 DIAGNOSIS — R91.8 MASS OF LEFT LUNG: Primary | ICD-10-CM

## 2020-11-19 NOTE — TELEPHONE ENCOUNTER
Case reveiwed in [facility], needs CT guided biopsy of left lung lesion. Right lesion is positive and 4R LN +, need to see if left is positive and ir positive, metastatic vs 2 primaries. Discussed with KADEN Hood who will get this scheduled.

## 2020-11-19 NOTE — TELEPHONE ENCOUNTER
SPoke with patient, she is adamantly refusing repeat biopsy. She will see Dr Nelida Peters on Monday and discuss treatment plan.

## 2020-11-23 ENCOUNTER — OFFICE VISIT (OUTPATIENT)
Dept: HEMATOLOGY/ONCOLOGY | Facility: HOSPITAL | Age: 65
End: 2020-11-23
Attending: INTERNAL MEDICINE
Payer: COMMERCIAL

## 2020-11-23 VITALS
BODY MASS INDEX: 21.46 KG/M2 | HEART RATE: 96 BPM | RESPIRATION RATE: 16 BRPM | DIASTOLIC BLOOD PRESSURE: 81 MMHG | TEMPERATURE: 98 F | SYSTOLIC BLOOD PRESSURE: 120 MMHG | WEIGHT: 122.63 LBS | HEIGHT: 63.23 IN | OXYGEN SATURATION: 97 %

## 2020-11-23 DIAGNOSIS — R91.8 MASS OF LEFT LUNG: ICD-10-CM

## 2020-11-23 DIAGNOSIS — C77.9 REGIONAL LYMPH NODE METASTASIS PRESENT (HCC): ICD-10-CM

## 2020-11-23 DIAGNOSIS — C34.91 NON-SMALL CELL CANCER OF RIGHT LUNG (HCC): Primary | ICD-10-CM

## 2020-11-23 PROBLEM — C34.90 NON-SMALL CELL LUNG CANCER METASTATIC TO MEDIASTINUM (HCC): Status: ACTIVE | Noted: 2020-11-23

## 2020-11-23 PROBLEM — C78.1 NON-SMALL CELL LUNG CANCER METASTATIC TO MEDIASTINUM (HCC): Status: ACTIVE | Noted: 2020-11-23

## 2020-11-23 PROCEDURE — 99215 OFFICE O/P EST HI 40 MIN: CPT | Performed by: INTERNAL MEDICINE

## 2020-11-23 RX ORDER — LEVOFLOXACIN 500 MG/1
500 TABLET, FILM COATED ORAL DAILY
Qty: 7 TABLET | Refills: 0 | Status: SHIPPED | OUTPATIENT
Start: 2020-11-23 | End: 2020-12-22 | Stop reason: ALTCHOICE

## 2020-11-23 NOTE — PROGRESS NOTES
Cancer Center Progress Note  Patient Name: Jada Jarquin   YOB: 1955   Medical Record Number: [id number]   CSN: [id number]   Attending Physician: Naz García M.D.        Date of Visit: 11/23/2020     Chief Complaint:  Patient pre The jeramie biopsy was positive for NSCLCa. The LLL mass was read as normal lung tissue. Her case was reviewed at the MD Thoracic tumor board, where CT guided biopsy of the LLL mass was recommended.    The patient steadfastly refuses to consider any further Frequency: Never        Binge frequency: Never      Drug use: Never      Sexual activity: Not on file    Lifestyle      Physical activity        Days per week: Not on file        Minutes per session: Not on file      Stress: Not on file    Relationships constipation. NL appetite, No Early Satiety. Genitorurinary No hematuria, dysuria, abnormal bleeding. Integumentary No rashes, No yellowing. Neurologic No headache, blurred vision, and no areas of focal weakness. Normal gait.    Psychiatric No insomni mild associated FDG activity. The left is suspicious for a synchronous primary low-grade lung tumor such as adenocarcinoma in situ. Inflammation is   possible but considered less likely. The right could represent the same or inflammation, indeterminate. navigational bronchoscopy to biopsy the mediastinal nodes and LLL mass. The 4R LN is positive for adenocarcinoma, c/w at least stage IIIA disease. The normal pathology of the LLL is discordant with the CT and PET findings of an enlarging PET positive mass.

## 2020-11-25 ENCOUNTER — LAB ENCOUNTER (OUTPATIENT)
Dept: LAB | Age: 65
End: 2020-11-25
Attending: INTERNAL MEDICINE
Payer: COMMERCIAL

## 2020-11-25 DIAGNOSIS — R06.00 DOE (DYSPNEA ON EXERTION): ICD-10-CM

## 2020-11-25 DIAGNOSIS — R00.2 PALPITATIONS: ICD-10-CM

## 2020-11-25 DIAGNOSIS — E78.00 PURE HYPERCHOLESTEROLEMIA: ICD-10-CM

## 2020-11-25 DIAGNOSIS — I70.0 AORTIC ATHEROSCLEROSIS (HCC): ICD-10-CM

## 2020-11-25 PROCEDURE — 80061 LIPID PANEL: CPT

## 2020-11-25 PROCEDURE — 36415 COLL VENOUS BLD VENIPUNCTURE: CPT

## 2020-11-25 PROCEDURE — 84460 ALANINE AMINO (ALT) (SGPT): CPT

## 2020-11-25 PROCEDURE — 84450 TRANSFERASE (AST) (SGOT): CPT

## 2020-12-01 ENCOUNTER — APPOINTMENT (OUTPATIENT)
Dept: HEMATOLOGY/ONCOLOGY | Facility: HOSPITAL | Age: 65
End: 2020-12-01
Attending: INTERNAL MEDICINE
Payer: COMMERCIAL

## 2020-12-02 ENCOUNTER — SOCIAL WORK SERVICES (OUTPATIENT)
Dept: HEMATOLOGY/ONCOLOGY | Facility: HOSPITAL | Age: 65
End: 2020-12-02

## 2020-12-04 ENCOUNTER — SOCIAL WORK SERVICES (OUTPATIENT)
Dept: HEMATOLOGY/ONCOLOGY | Facility: HOSPITAL | Age: 65
End: 2020-12-04

## 2020-12-04 NOTE — PROGRESS NOTES
received message from Patient stating that [facility] informed her Disability Forms were submitted for return to work 12/03/2020;  did not see Disability Forms scanned in yet that were completed on 12/03/20 so spoke with Dr. Ozzy Robbins

## 2020-12-07 ENCOUNTER — SOCIAL WORK SERVICES (OUTPATIENT)
Dept: HEMATOLOGY/ONCOLOGY | Facility: HOSPITAL | Age: 65
End: 2020-12-07

## 2020-12-08 ENCOUNTER — OFFICE VISIT (OUTPATIENT)
Dept: HEMATOLOGY/ONCOLOGY | Facility: HOSPITAL | Age: 65
End: 2020-12-08
Attending: INTERNAL MEDICINE
Payer: COMMERCIAL

## 2020-12-08 VITALS
TEMPERATURE: 99 F | HEIGHT: 62.99 IN | RESPIRATION RATE: 16 BRPM | OXYGEN SATURATION: 97 % | WEIGHT: 122 LBS | HEART RATE: 84 BPM | SYSTOLIC BLOOD PRESSURE: 119 MMHG | DIASTOLIC BLOOD PRESSURE: 59 MMHG | BODY MASS INDEX: 21.62 KG/M2

## 2020-12-08 DIAGNOSIS — Z71.9 ENCOUNTER FOR EDUCATION: ICD-10-CM

## 2020-12-08 DIAGNOSIS — C78.1 NON-SMALL CELL LUNG CANCER METASTATIC TO MEDIASTINUM (HCC): Primary | ICD-10-CM

## 2020-12-08 DIAGNOSIS — C34.90 NON-SMALL CELL LUNG CANCER METASTATIC TO MEDIASTINUM (HCC): Primary | ICD-10-CM

## 2020-12-08 PROCEDURE — 96413 CHEMO IV INFUSION 1 HR: CPT

## 2020-12-08 PROCEDURE — 96417 CHEMO IV INFUS EACH ADDL SEQ: CPT

## 2020-12-08 PROCEDURE — 99215 OFFICE O/P EST HI 40 MIN: CPT | Performed by: NURSE PRACTITIONER

## 2020-12-08 NOTE — PROGRESS NOTES
Immunotherapy  Education    Learner:  Patient and Spouse    Barriers / Limitations:  None    Immunotherapy  education goals:  · Learn the drug names  · Administration schedule  · Routes of administration  · Treatment setting    Drug names:  Ipilimumab + Ni minutes with the patient, 100 % of that time was spent counseling patient regarding the above documented side effects and management, when to call provider and contact information.      [facility] , NP-C  Nurse Practitioner  THE [facility]

## 2020-12-08 NOTE — PROGRESS NOTES
Pt here for C1D1 opdivo/yervoy.   Arrives Ambulating independently, accompanied by Spouse           Patient reports possible pregnancy since last therapy cycle: Not Applicable    Modifications in dose or schedule: No     Frequency of blood return and site c

## 2020-12-09 ENCOUNTER — TELEPHONE (OUTPATIENT)
Dept: HEMATOLOGY/ONCOLOGY | Facility: HOSPITAL | Age: 65
End: 2020-12-09

## 2020-12-09 ENCOUNTER — SOCIAL WORK SERVICES (OUTPATIENT)
Dept: HEMATOLOGY/ONCOLOGY | Facility: HOSPITAL | Age: 65
End: 2020-12-09

## 2020-12-09 NOTE — PROGRESS NOTES
faxed requested medical documentation from 11/23/2020-present  to Cindy at [place] at C#[phone]

## 2020-12-09 NOTE — TELEPHONE ENCOUNTER
Toxicities: C1 D1 Ipilimumab/Nivolumab on 12/8/2020    Brock Putnam is feeling well. No side effects at this time. I encouraged her to please call the office if she is not feeling well or she has any questions or concerns. She thanked me for checking on her.

## 2020-12-09 NOTE — TELEPHONE ENCOUNTER
[address] [facility] at [phone] EXT. [phone] is calling to discuss FMLA, short term disability and need additional medical support, please fax this information to [phone].

## 2020-12-11 ENCOUNTER — DIETICIAN VISIT (OUTPATIENT)
Dept: HEMATOLOGY/ONCOLOGY | Facility: HOSPITAL | Age: 65
End: 2020-12-11

## 2020-12-11 NOTE — PROGRESS NOTES
Nutrition screen complete as triggered by [employer] dx of NSCLC. Chart reviewed. Pt appears nutritionally stable at present. RD available on consult.

## 2020-12-22 ENCOUNTER — OFFICE VISIT (OUTPATIENT)
Dept: HEMATOLOGY/ONCOLOGY | Facility: HOSPITAL | Age: 65
End: 2020-12-22
Attending: INTERNAL MEDICINE
Payer: COMMERCIAL

## 2020-12-22 VITALS
DIASTOLIC BLOOD PRESSURE: 68 MMHG | WEIGHT: 120 LBS | SYSTOLIC BLOOD PRESSURE: 109 MMHG | TEMPERATURE: 98 F | HEART RATE: 71 BPM | OXYGEN SATURATION: 96 % | BODY MASS INDEX: 21.26 KG/M2 | RESPIRATION RATE: 16 BRPM | HEIGHT: 62.99 IN

## 2020-12-22 DIAGNOSIS — Z51.11 CHEMOTHERAPY MANAGEMENT, ENCOUNTER FOR: ICD-10-CM

## 2020-12-22 DIAGNOSIS — C34.90 NON-SMALL CELL LUNG CANCER METASTATIC TO MEDIASTINUM (HCC): Primary | ICD-10-CM

## 2020-12-22 DIAGNOSIS — C78.1 NON-SMALL CELL LUNG CANCER METASTATIC TO MEDIASTINUM (HCC): Primary | ICD-10-CM

## 2020-12-22 PROCEDURE — 99215 OFFICE O/P EST HI 40 MIN: CPT | Performed by: INTERNAL MEDICINE

## 2020-12-22 PROCEDURE — 96413 CHEMO IV INFUSION 1 HR: CPT

## 2020-12-22 NOTE — PROGRESS NOTES
Cancer Center Progress Note  Patient Name: Joleen Phan   YOB: 1955   Medical Record Number: [id number]   CSN: [id number]   Attending Physician: Seymour Canela M.D.        Date of Visit: 12/22/2020       Chief Complaint:  Patient p steadfastly refused to consider any further biopsy or workup. After discussing treatment options for presumed stage IV lung cancer, we settled on combined immunotherapy.     History of Present Illness:  Pt is here for follow up and D 15 of cycle 1 of combi Used    Substance and Sexual Activity      Alcohol use: Not Currently        Frequency: Never        Binge frequency: Never      Drug use: Never      Sexual activity: Not on file    Lifestyle      Physical activity        Days per week: Not on file Genitorurinary  No hematuria, dysuria, abnormal bleeding, or incontinence. Integumentary No rashes or yellowing of the skin   Neurologic No headache, blurred vision, and no areas of focal weakness. Normal gait.    Psychiatric No insomnia, depression, ma is felt to represent a primary malignant lung tumor. The left could be primary or secondary to a metastasis. 2.  Groundglass opacities focally within the right middle lobe and the left upper lobe have very mild associated FDG activity.   The left is roby lesions, concerning for both being malignant. Her case was reviewed at the multidisciplinary thoracic tumor board, where the possibility of two curable primaries was raised.  Recommendation was made for navigational bronchoscopy to biopsy the mediastinal n

## 2020-12-22 NOTE — PROGRESS NOTES
Pt here for Kelley Humphries.   Arrives Ambulating independently, accompanied by Self           Patient reports possible pregnancy since last therapy cycle: No    Modifications in dose or schedule: no     Frequency of blood return and site check throughout admi

## 2020-12-31 ENCOUNTER — PRIOR ORIGINAL RECORDS (OUTPATIENT)
Dept: OTHER | Age: 65
End: 2020-12-31

## 2021-01-01 ENCOUNTER — EXTERNAL RECORD (OUTPATIENT)
Dept: HEALTH INFORMATION MANAGEMENT | Facility: OTHER | Age: 66
End: 2021-01-01

## 2021-01-04 ENCOUNTER — OFFICE VISIT (OUTPATIENT)
Dept: HEMATOLOGY/ONCOLOGY | Facility: HOSPITAL | Age: 66
End: 2021-01-04
Attending: INTERNAL MEDICINE
Payer: COMMERCIAL

## 2021-01-04 VITALS
DIASTOLIC BLOOD PRESSURE: 76 MMHG | WEIGHT: 121 LBS | HEIGHT: 62.99 IN | OXYGEN SATURATION: 96 % | SYSTOLIC BLOOD PRESSURE: 118 MMHG | RESPIRATION RATE: 16 BRPM | HEART RATE: 75 BPM | BODY MASS INDEX: 21.44 KG/M2 | TEMPERATURE: 98 F

## 2021-01-04 DIAGNOSIS — C78.1 NON-SMALL CELL LUNG CANCER METASTATIC TO MEDIASTINUM (HCC): Primary | ICD-10-CM

## 2021-01-04 DIAGNOSIS — C34.90 NON-SMALL CELL LUNG CANCER METASTATIC TO MEDIASTINUM (HCC): Primary | ICD-10-CM

## 2021-01-04 LAB
ALBUMIN SERPL-MCNC: 3.3 G/DL (ref 3.4–5)
ALBUMIN/GLOB SERPL: 0.9 {RATIO} (ref 1–2)
ALP LIVER SERPL-CCNC: 79 U/L
ALT SERPL-CCNC: 21 U/L
ANION GAP SERPL CALC-SCNC: 2 MMOL/L (ref 0–18)
AST SERPL-CCNC: 16 U/L (ref 15–37)
BASOPHILS # BLD AUTO: 0.06 X10(3) UL (ref 0–0.2)
BASOPHILS NFR BLD AUTO: 0.6 %
BILIRUB SERPL-MCNC: 0.4 MG/DL (ref 0.1–2)
BUN BLD-MCNC: 15 MG/DL (ref 7–18)
BUN/CREAT SERPL: 23.8 (ref 10–20)
CALCIUM BLD-MCNC: 9 MG/DL (ref 8.5–10.1)
CHLORIDE SERPL-SCNC: 109 MMOL/L (ref 98–112)
CO2 SERPL-SCNC: 29 MMOL/L (ref 21–32)
CREAT BLD-MCNC: 0.63 MG/DL
DEPRECATED RDW RBC AUTO: 44.9 FL (ref 35.1–46.3)
EOSINOPHIL # BLD AUTO: 0.79 X10(3) UL (ref 0–0.7)
EOSINOPHIL NFR BLD AUTO: 7.9 %
ERYTHROCYTE [DISTWIDTH] IN BLOOD BY AUTOMATED COUNT: 13 % (ref 11–15)
GLOBULIN PLAS-MCNC: 3.5 G/DL (ref 2.8–4.4)
GLUCOSE BLD-MCNC: 95 MG/DL (ref 70–99)
HCT VFR BLD AUTO: 39.9 %
HGB BLD-MCNC: 12.8 G/DL
IMM GRANULOCYTES # BLD AUTO: 0.03 X10(3) UL (ref 0–1)
IMM GRANULOCYTES NFR BLD: 0.3 %
LYMPHOCYTES # BLD AUTO: 2.68 X10(3) UL (ref 1–4)
LYMPHOCYTES NFR BLD AUTO: 26.7 %
M PROTEIN MFR SERPL ELPH: 6.8 G/DL (ref 6.4–8.2)
MCH RBC QN AUTO: 30.5 PG (ref 26–34)
MCHC RBC AUTO-ENTMCNC: 32.1 G/DL (ref 31–37)
MCV RBC AUTO: 95 FL
MONOCYTES # BLD AUTO: 0.73 X10(3) UL (ref 0.1–1)
MONOCYTES NFR BLD AUTO: 7.3 %
NEUTROPHILS # BLD AUTO: 5.73 X10 (3) UL (ref 1.5–7.7)
NEUTROPHILS # BLD AUTO: 5.73 X10(3) UL (ref 1.5–7.7)
NEUTROPHILS NFR BLD AUTO: 57.2 %
OSMOLALITY SERPL CALC.SUM OF ELEC: 291 MOSM/KG (ref 275–295)
PLATELET # BLD AUTO: 305 10(3)UL (ref 150–450)
POTASSIUM SERPL-SCNC: 4.6 MMOL/L (ref 3.5–5.1)
RBC # BLD AUTO: 4.2 X10(6)UL
SODIUM SERPL-SCNC: 140 MMOL/L (ref 136–145)
TSI SER-ACNC: 3.24 MIU/ML (ref 0.36–3.74)
WBC # BLD AUTO: 10 X10(3) UL (ref 4–11)

## 2021-01-04 PROCEDURE — 36415 COLL VENOUS BLD VENIPUNCTURE: CPT

## 2021-01-04 PROCEDURE — 96413 CHEMO IV INFUSION 1 HR: CPT

## 2021-01-04 PROCEDURE — 84443 ASSAY THYROID STIM HORMONE: CPT

## 2021-01-04 PROCEDURE — 80053 COMPREHEN METABOLIC PANEL: CPT

## 2021-01-04 PROCEDURE — 85025 COMPLETE CBC W/AUTO DIFF WBC: CPT

## 2021-01-04 NOTE — PROGRESS NOTES
Pt here for C1D29.   Arrives Ambulating independently, accompanied by Self           Patient reports possible pregnancy since last therapy cycle: No    Modifications in dose or schedule: No     Frequency of blood return and site check throughout administrat verbalized understanding.

## 2021-01-18 ENCOUNTER — OFFICE VISIT (OUTPATIENT)
Dept: HEMATOLOGY/ONCOLOGY | Facility: HOSPITAL | Age: 66
End: 2021-01-18
Attending: INTERNAL MEDICINE
Payer: COMMERCIAL

## 2021-01-18 VITALS
WEIGHT: 120 LBS | SYSTOLIC BLOOD PRESSURE: 114 MMHG | HEART RATE: 80 BPM | DIASTOLIC BLOOD PRESSURE: 65 MMHG | RESPIRATION RATE: 16 BRPM | HEIGHT: 62.99 IN | OXYGEN SATURATION: 96 % | BODY MASS INDEX: 21.26 KG/M2 | TEMPERATURE: 98 F

## 2021-01-18 DIAGNOSIS — C78.1 NON-SMALL CELL LUNG CANCER METASTATIC TO MEDIASTINUM (HCC): Primary | ICD-10-CM

## 2021-01-18 DIAGNOSIS — C34.90 NON-SMALL CELL LUNG CANCER METASTATIC TO MEDIASTINUM (HCC): Primary | ICD-10-CM

## 2021-01-18 DIAGNOSIS — C78.1 NON-SMALL CELL LUNG CANCER METASTATIC TO MEDIASTINUM (HCC): ICD-10-CM

## 2021-01-18 DIAGNOSIS — C34.90 NON-SMALL CELL LUNG CANCER METASTATIC TO MEDIASTINUM (HCC): ICD-10-CM

## 2021-01-18 LAB
ALBUMIN SERPL-MCNC: 3.4 G/DL (ref 3.4–5)
ALBUMIN/GLOB SERPL: 0.9 {RATIO} (ref 1–2)
ALP LIVER SERPL-CCNC: 84 U/L
ALT SERPL-CCNC: 18 U/L
ANION GAP SERPL CALC-SCNC: 5 MMOL/L (ref 0–18)
AST SERPL-CCNC: 16 U/L (ref 15–37)
BASOPHILS # BLD AUTO: 0.04 X10(3) UL (ref 0–0.2)
BASOPHILS NFR BLD AUTO: 0.4 %
BILIRUB SERPL-MCNC: 0.3 MG/DL (ref 0.1–2)
BUN BLD-MCNC: 19 MG/DL (ref 7–18)
BUN/CREAT SERPL: 33.9 (ref 10–20)
CALCIUM BLD-MCNC: 8.9 MG/DL (ref 8.5–10.1)
CHLORIDE SERPL-SCNC: 108 MMOL/L (ref 98–112)
CO2 SERPL-SCNC: 26 MMOL/L (ref 21–32)
CREAT BLD-MCNC: 0.56 MG/DL
DEPRECATED RDW RBC AUTO: 45.5 FL (ref 35.1–46.3)
EOSINOPHIL # BLD AUTO: 2.24 X10(3) UL (ref 0–0.7)
EOSINOPHIL NFR BLD AUTO: 22.9 %
ERYTHROCYTE [DISTWIDTH] IN BLOOD BY AUTOMATED COUNT: 12.9 % (ref 11–15)
GLOBULIN PLAS-MCNC: 3.6 G/DL (ref 2.8–4.4)
GLUCOSE BLD-MCNC: 81 MG/DL (ref 70–99)
HCT VFR BLD AUTO: 41 %
HGB BLD-MCNC: 13.2 G/DL
IMM GRANULOCYTES # BLD AUTO: 0.02 X10(3) UL (ref 0–1)
IMM GRANULOCYTES NFR BLD: 0.2 %
LYMPHOCYTES # BLD AUTO: 2.25 X10(3) UL (ref 1–4)
LYMPHOCYTES NFR BLD AUTO: 23 %
M PROTEIN MFR SERPL ELPH: 7 G/DL (ref 6.4–8.2)
MCH RBC QN AUTO: 31.4 PG (ref 26–34)
MCHC RBC AUTO-ENTMCNC: 32.2 G/DL (ref 31–37)
MCV RBC AUTO: 97.4 FL
MONOCYTES # BLD AUTO: 0.73 X10(3) UL (ref 0.1–1)
MONOCYTES NFR BLD AUTO: 7.5 %
NEUTROPHILS # BLD AUTO: 4.5 X10 (3) UL (ref 1.5–7.7)
NEUTROPHILS # BLD AUTO: 4.5 X10(3) UL (ref 1.5–7.7)
NEUTROPHILS NFR BLD AUTO: 46 %
OSMOLALITY SERPL CALC.SUM OF ELEC: 289 MOSM/KG (ref 275–295)
PLATELET # BLD AUTO: 262 10(3)UL (ref 150–450)
POTASSIUM SERPL-SCNC: 3.7 MMOL/L (ref 3.5–5.1)
RBC # BLD AUTO: 4.21 X10(6)UL
SODIUM SERPL-SCNC: 139 MMOL/L (ref 136–145)
WBC # BLD AUTO: 9.8 X10(3) UL (ref 4–11)

## 2021-01-18 PROCEDURE — 96417 CHEMO IV INFUS EACH ADDL SEQ: CPT

## 2021-01-18 PROCEDURE — 99215 OFFICE O/P EST HI 40 MIN: CPT | Performed by: INTERNAL MEDICINE

## 2021-01-18 PROCEDURE — 96413 CHEMO IV INFUSION 1 HR: CPT

## 2021-01-18 NOTE — PROGRESS NOTES
Pt here for C2D1 Opdivo/Yervoy.   Arrives Ambulating independently, accompanied by Self           Patient reports possible pregnancy since last therapy cycle: No    Modifications in dose or schedule: No     Frequency of blood return and site check throughou

## 2021-01-18 NOTE — PROGRESS NOTES
Pt here for follow up and treatment. Pt complains of occasional headaches when she wakes up. She states this is her baseline.

## 2021-01-18 NOTE — PROGRESS NOTES
Cancer Center Progress Note  Patient Name: Oval Libman   YOB: 1955   Medical Record Number: [id number]   CSN: [id number]   Attending Physician: Radha Cary M.D.        Date of Visit: 1/18/2021     Chief Complaint:  Patient pres steadfastly refused to consider any further biopsy or workup. After discussing treatment options for presumed stage IV lung cancer, we settled on combined immunotherapy.     History of Present Illness:  Pt is here for follow up and D 1 of cycle 2 of combin 61        Start date: 1/20/1968        Quit date: 9/30/2010        Years since quitting: 10.3      Smokeless tobacco: Never Used    Substance and Sexual Activity      Alcohol use: Not Currently        Frequency: Never        Binge frequency: Never      Terrance bleeding. Integumentary No rashes or yellowing of the skin   Neurologic No blurred vision, and no areas of focal weakness. Normal gait. Psychiatric No insomnia, depression, olayinka or mood swings.          Vital Signs:  /65 (BP Location: Left arm, P consistent with known lung primary. -(See comment).     B.  EBUS guided fine-needle aspiration of left lower lobe lung mass:  -Adequate for evaluation.  -No cytologic evidence of malignancy.  -Specimen composed predominantly of benign bronchial epithelial benefits, and side effects, and she agreed to proceed. She tolerated Cycle 1 with mild headache and dyspnea. Proceed with C2 D1 Ipi/Nivolumab today. Plan CT after 3-4 cycles. Planned Follow Up:  6 weeks for MD labs and chemo. Chemo in 2 weeks.

## 2021-01-28 ENCOUNTER — SOCIAL WORK SERVICES (OUTPATIENT)
Dept: HEMATOLOGY/ONCOLOGY | Facility: HOSPITAL | Age: 66
End: 2021-01-28

## 2021-01-28 NOTE — PROGRESS NOTES
Sw faxed requested information from [employer] to [employer] at [phone] along with last time physician notes. Sw spoke with patient to confirm requested return to work date. Patient states that shes in the process of switching to [employer].

## 2021-02-01 ENCOUNTER — OFFICE VISIT (OUTPATIENT)
Dept: HEMATOLOGY/ONCOLOGY | Facility: HOSPITAL | Age: 66
End: 2021-02-01
Attending: INTERNAL MEDICINE
Payer: COMMERCIAL

## 2021-02-01 VITALS
BODY MASS INDEX: 21.73 KG/M2 | HEIGHT: 62.99 IN | WEIGHT: 122.63 LBS | DIASTOLIC BLOOD PRESSURE: 72 MMHG | HEART RATE: 65 BPM | SYSTOLIC BLOOD PRESSURE: 124 MMHG | OXYGEN SATURATION: 97 % | RESPIRATION RATE: 16 BRPM | TEMPERATURE: 98 F

## 2021-02-01 DIAGNOSIS — C78.1 NON-SMALL CELL LUNG CANCER METASTATIC TO MEDIASTINUM (HCC): Primary | ICD-10-CM

## 2021-02-01 DIAGNOSIS — C34.90 NON-SMALL CELL LUNG CANCER METASTATIC TO MEDIASTINUM (HCC): Primary | ICD-10-CM

## 2021-02-01 LAB
ALBUMIN SERPL-MCNC: 3.3 G/DL (ref 3.4–5)
ALBUMIN/GLOB SERPL: 1 {RATIO} (ref 1–2)
ALP LIVER SERPL-CCNC: 78 U/L
ALT SERPL-CCNC: 16 U/L
ANION GAP SERPL CALC-SCNC: 4 MMOL/L (ref 0–18)
AST SERPL-CCNC: 19 U/L (ref 15–37)
BASOPHILS # BLD AUTO: 0.04 X10(3) UL (ref 0–0.2)
BASOPHILS NFR BLD AUTO: 0.4 %
BILIRUB SERPL-MCNC: 0.2 MG/DL (ref 0.1–2)
BUN BLD-MCNC: 17 MG/DL (ref 7–18)
BUN/CREAT SERPL: 32.1 (ref 10–20)
CALCIUM BLD-MCNC: 9 MG/DL (ref 8.5–10.1)
CHLORIDE SERPL-SCNC: 108 MMOL/L (ref 98–112)
CO2 SERPL-SCNC: 29 MMOL/L (ref 21–32)
CREAT BLD-MCNC: 0.53 MG/DL
DEPRECATED RDW RBC AUTO: 47.4 FL (ref 35.1–46.3)
EOSINOPHIL # BLD AUTO: 2.44 X10(3) UL (ref 0–0.7)
EOSINOPHIL NFR BLD AUTO: 24.5 %
ERYTHROCYTE [DISTWIDTH] IN BLOOD BY AUTOMATED COUNT: 13.2 % (ref 11–15)
GLOBULIN PLAS-MCNC: 3.3 G/DL (ref 2.8–4.4)
GLUCOSE BLD-MCNC: 91 MG/DL (ref 70–99)
HCT VFR BLD AUTO: 40.6 %
HGB BLD-MCNC: 13 G/DL
IMM GRANULOCYTES # BLD AUTO: 0.02 X10(3) UL (ref 0–1)
IMM GRANULOCYTES NFR BLD: 0.2 %
LYMPHOCYTES # BLD AUTO: 2.23 X10(3) UL (ref 1–4)
LYMPHOCYTES NFR BLD AUTO: 22.4 %
M PROTEIN MFR SERPL ELPH: 6.6 G/DL (ref 6.4–8.2)
MCH RBC QN AUTO: 31.3 PG (ref 26–34)
MCHC RBC AUTO-ENTMCNC: 32 G/DL (ref 31–37)
MCV RBC AUTO: 97.6 FL
MONOCYTES # BLD AUTO: 0.75 X10(3) UL (ref 0.1–1)
MONOCYTES NFR BLD AUTO: 7.5 %
NEUTROPHILS # BLD AUTO: 4.46 X10 (3) UL (ref 1.5–7.7)
NEUTROPHILS # BLD AUTO: 4.46 X10(3) UL (ref 1.5–7.7)
NEUTROPHILS NFR BLD AUTO: 45 %
OSMOLALITY SERPL CALC.SUM OF ELEC: 293 MOSM/KG (ref 275–295)
PATIENT FASTING Y/N/NP: NO
PLATELET # BLD AUTO: 246 10(3)UL (ref 150–450)
POTASSIUM SERPL-SCNC: 3.7 MMOL/L (ref 3.5–5.1)
RBC # BLD AUTO: 4.16 X10(6)UL
SODIUM SERPL-SCNC: 141 MMOL/L (ref 136–145)
TSI SER-ACNC: 1.85 MIU/ML (ref 0.36–3.74)
WBC # BLD AUTO: 9.9 X10(3) UL (ref 4–11)

## 2021-02-01 PROCEDURE — 96413 CHEMO IV INFUSION 1 HR: CPT

## 2021-02-01 PROCEDURE — 36415 COLL VENOUS BLD VENIPUNCTURE: CPT

## 2021-02-01 PROCEDURE — 80053 COMPREHEN METABOLIC PANEL: CPT

## 2021-02-01 PROCEDURE — 84443 ASSAY THYROID STIM HORMONE: CPT

## 2021-02-01 PROCEDURE — 85025 COMPLETE CBC W/AUTO DIFF WBC: CPT

## 2021-02-01 NOTE — PROGRESS NOTES
Pt here for C2D15.   Arrives Ambulating independently, accompanied by Self           Patient reports possible pregnancy since last therapy cycle: No    Modifications in dose or schedule: No     Frequency of blood return and site check throughout administrat

## 2021-02-15 ENCOUNTER — OFFICE VISIT (OUTPATIENT)
Dept: HEMATOLOGY/ONCOLOGY | Facility: HOSPITAL | Age: 66
End: 2021-02-15
Attending: INTERNAL MEDICINE
Payer: COMMERCIAL

## 2021-02-15 VITALS
OXYGEN SATURATION: 98 % | DIASTOLIC BLOOD PRESSURE: 81 MMHG | WEIGHT: 123.38 LBS | BODY MASS INDEX: 22 KG/M2 | TEMPERATURE: 98 F | HEART RATE: 90 BPM | RESPIRATION RATE: 18 BRPM | SYSTOLIC BLOOD PRESSURE: 115 MMHG

## 2021-02-15 DIAGNOSIS — C78.1 NON-SMALL CELL LUNG CANCER METASTATIC TO MEDIASTINUM (HCC): Primary | ICD-10-CM

## 2021-02-15 DIAGNOSIS — C34.90 NON-SMALL CELL LUNG CANCER METASTATIC TO MEDIASTINUM (HCC): Primary | ICD-10-CM

## 2021-02-15 LAB
ALBUMIN SERPL-MCNC: 3.5 G/DL (ref 3.4–5)
ALBUMIN/GLOB SERPL: 1 {RATIO} (ref 1–2)
ALP LIVER SERPL-CCNC: 81 U/L
ALT SERPL-CCNC: 23 U/L
ANION GAP SERPL CALC-SCNC: 3 MMOL/L (ref 0–18)
AST SERPL-CCNC: 13 U/L (ref 15–37)
BASOPHILS # BLD AUTO: 0.07 X10(3) UL (ref 0–0.2)
BASOPHILS NFR BLD AUTO: 0.7 %
BILIRUB SERPL-MCNC: 0.2 MG/DL (ref 0.1–2)
BUN BLD-MCNC: 16 MG/DL (ref 7–18)
BUN/CREAT SERPL: 28.1 (ref 10–20)
CALCIUM BLD-MCNC: 8.9 MG/DL (ref 8.5–10.1)
CHLORIDE SERPL-SCNC: 108 MMOL/L (ref 98–112)
CO2 SERPL-SCNC: 31 MMOL/L (ref 21–32)
CREAT BLD-MCNC: 0.57 MG/DL
DEPRECATED RDW RBC AUTO: 48.6 FL (ref 35.1–46.3)
EOSINOPHIL # BLD AUTO: 2.19 X10(3) UL (ref 0–0.7)
EOSINOPHIL NFR BLD AUTO: 20.6 %
ERYTHROCYTE [DISTWIDTH] IN BLOOD BY AUTOMATED COUNT: 13.3 % (ref 11–15)
GLOBULIN PLAS-MCNC: 3.4 G/DL (ref 2.8–4.4)
GLUCOSE BLD-MCNC: 82 MG/DL (ref 70–99)
HCT VFR BLD AUTO: 42.6 %
HGB BLD-MCNC: 13.5 G/DL
IMM GRANULOCYTES # BLD AUTO: 0.02 X10(3) UL (ref 0–1)
IMM GRANULOCYTES NFR BLD: 0.2 %
LYMPHOCYTES # BLD AUTO: 2.26 X10(3) UL (ref 1–4)
LYMPHOCYTES NFR BLD AUTO: 21.3 %
M PROTEIN MFR SERPL ELPH: 6.9 G/DL (ref 6.4–8.2)
MCH RBC QN AUTO: 31 PG (ref 26–34)
MCHC RBC AUTO-ENTMCNC: 31.7 G/DL (ref 31–37)
MCV RBC AUTO: 97.7 FL
MONOCYTES # BLD AUTO: 0.81 X10(3) UL (ref 0.1–1)
MONOCYTES NFR BLD AUTO: 7.6 %
NEUTROPHILS # BLD AUTO: 5.28 X10 (3) UL (ref 1.5–7.7)
NEUTROPHILS # BLD AUTO: 5.28 X10(3) UL (ref 1.5–7.7)
NEUTROPHILS NFR BLD AUTO: 49.6 %
OSMOLALITY SERPL CALC.SUM OF ELEC: 294 MOSM/KG (ref 275–295)
PATIENT FASTING Y/N/NP: NO
PLATELET # BLD AUTO: 240 10(3)UL (ref 150–450)
POTASSIUM SERPL-SCNC: 4.4 MMOL/L (ref 3.5–5.1)
RBC # BLD AUTO: 4.36 X10(6)UL
SODIUM SERPL-SCNC: 142 MMOL/L (ref 136–145)
WBC # BLD AUTO: 10.6 X10(3) UL (ref 4–11)

## 2021-02-15 PROCEDURE — 96413 CHEMO IV INFUSION 1 HR: CPT

## 2021-02-15 PROCEDURE — 36415 COLL VENOUS BLD VENIPUNCTURE: CPT

## 2021-02-15 PROCEDURE — 80053 COMPREHEN METABOLIC PANEL: CPT

## 2021-02-15 PROCEDURE — 85025 COMPLETE CBC W/AUTO DIFF WBC: CPT

## 2021-02-15 NOTE — PROGRESS NOTES
Pt here for C 2 D 29 opdivo.   Arrives Ambulating independently, accompanied by Self           Patient reports possible pregnancy since last therapy cycle: Not Applicable    Modifications in dose or schedule: No     Frequency of blood return and site check

## 2021-03-01 ENCOUNTER — OFFICE VISIT (OUTPATIENT)
Dept: HEMATOLOGY/ONCOLOGY | Facility: HOSPITAL | Age: 66
End: 2021-03-01
Attending: INTERNAL MEDICINE
Payer: COMMERCIAL

## 2021-03-01 VITALS
BODY MASS INDEX: 21.09 KG/M2 | RESPIRATION RATE: 16 BRPM | TEMPERATURE: 97 F | HEART RATE: 78 BPM | OXYGEN SATURATION: 97 % | WEIGHT: 119 LBS | SYSTOLIC BLOOD PRESSURE: 108 MMHG | DIASTOLIC BLOOD PRESSURE: 68 MMHG | HEIGHT: 62.99 IN

## 2021-03-01 DIAGNOSIS — C78.1 NON-SMALL CELL LUNG CANCER METASTATIC TO MEDIASTINUM (HCC): Primary | ICD-10-CM

## 2021-03-01 DIAGNOSIS — C78.1 NON-SMALL CELL LUNG CANCER METASTATIC TO MEDIASTINUM (HCC): ICD-10-CM

## 2021-03-01 DIAGNOSIS — C34.90 NON-SMALL CELL LUNG CANCER METASTATIC TO MEDIASTINUM (HCC): Primary | ICD-10-CM

## 2021-03-01 DIAGNOSIS — C34.90 NON-SMALL CELL LUNG CANCER METASTATIC TO MEDIASTINUM (HCC): ICD-10-CM

## 2021-03-01 LAB
ALBUMIN SERPL-MCNC: 3.5 G/DL (ref 3.4–5)
ALBUMIN/GLOB SERPL: 1 {RATIO} (ref 1–2)
ALP LIVER SERPL-CCNC: 80 U/L
ALT SERPL-CCNC: 26 U/L
ANION GAP SERPL CALC-SCNC: 3 MMOL/L (ref 0–18)
AST SERPL-CCNC: 13 U/L (ref 15–37)
BASOPHILS # BLD AUTO: 0.06 X10(3) UL (ref 0–0.2)
BASOPHILS NFR BLD AUTO: 0.7 %
BILIRUB SERPL-MCNC: 0.4 MG/DL (ref 0.1–2)
BUN BLD-MCNC: 20 MG/DL (ref 7–18)
BUN/CREAT SERPL: 28.2 (ref 10–20)
CALCIUM BLD-MCNC: 8.9 MG/DL (ref 8.5–10.1)
CHLORIDE SERPL-SCNC: 107 MMOL/L (ref 98–112)
CO2 SERPL-SCNC: 28 MMOL/L (ref 21–32)
CREAT BLD-MCNC: 0.71 MG/DL
DEPRECATED RDW RBC AUTO: 48.6 FL (ref 35.1–46.3)
EOSINOPHIL # BLD AUTO: 1.55 X10(3) UL (ref 0–0.7)
EOSINOPHIL NFR BLD AUTO: 17.5 %
ERYTHROCYTE [DISTWIDTH] IN BLOOD BY AUTOMATED COUNT: 13.7 % (ref 11–15)
GLOBULIN PLAS-MCNC: 3.6 G/DL (ref 2.8–4.4)
GLUCOSE BLD-MCNC: 82 MG/DL (ref 70–99)
HCT VFR BLD AUTO: 44 %
HGB BLD-MCNC: 14.4 G/DL
IMM GRANULOCYTES # BLD AUTO: 0.02 X10(3) UL (ref 0–1)
IMM GRANULOCYTES NFR BLD: 0.2 %
LYMPHOCYTES # BLD AUTO: 2 X10(3) UL (ref 1–4)
LYMPHOCYTES NFR BLD AUTO: 22.6 %
M PROTEIN MFR SERPL ELPH: 7.1 G/DL (ref 6.4–8.2)
MCH RBC QN AUTO: 31.5 PG (ref 26–34)
MCHC RBC AUTO-ENTMCNC: 32.7 G/DL (ref 31–37)
MCV RBC AUTO: 96.3 FL
MONOCYTES # BLD AUTO: 0.65 X10(3) UL (ref 0.1–1)
MONOCYTES NFR BLD AUTO: 7.3 %
NEUTROPHILS # BLD AUTO: 4.58 X10 (3) UL (ref 1.5–7.7)
NEUTROPHILS # BLD AUTO: 4.58 X10(3) UL (ref 1.5–7.7)
NEUTROPHILS NFR BLD AUTO: 51.7 %
OSMOLALITY SERPL CALC.SUM OF ELEC: 288 MOSM/KG (ref 275–295)
PLATELET # BLD AUTO: 233 10(3)UL (ref 150–450)
POTASSIUM SERPL-SCNC: 3.8 MMOL/L (ref 3.5–5.1)
RBC # BLD AUTO: 4.57 X10(6)UL
SODIUM SERPL-SCNC: 138 MMOL/L (ref 136–145)
TSI SER-ACNC: 1.81 MIU/ML (ref 0.36–3.74)
WBC # BLD AUTO: 8.9 X10(3) UL (ref 4–11)

## 2021-03-01 PROCEDURE — 96417 CHEMO IV INFUS EACH ADDL SEQ: CPT

## 2021-03-01 PROCEDURE — 99215 OFFICE O/P EST HI 40 MIN: CPT | Performed by: INTERNAL MEDICINE

## 2021-03-01 PROCEDURE — 96413 CHEMO IV INFUSION 1 HR: CPT

## 2021-03-01 NOTE — PROGRESS NOTES
Cancer Center Progress Note  Patient Name: Augustin Richardson   YOB: 1955   Medical Record Number: [id number]   CSN: [id number]   Attending Physician: Hawk Dumont M.D.        Date of Visit: 3/1/2021       Chief Complaint:  Patient pre steadfastly refused to consider any further biopsy or workup. After discussing treatment options for presumed stage IV lung cancer, we settled on combined immunotherapy.     History of Present Illness:  Pt is here for follow up and D 1 of cycle 3 of combin file    Tobacco Use      Smoking status: Former Smoker        Packs/day: 1.50        Years: 40.00        Pack years: 61        Start date: 1/20/1968        Quit date: 9/30/2010        Years since quitting: 10.4      Smokeless tobacco: Never Used    Substan vomiting, diarrhea, GI bleeding, or constipation. NL appetite, No Early Satiety. Genitorurinary No hematuria, dysuria, abnormal bleeding. Integumentary No rashes, No yellowing. Neurologic No headache, blurred vision, and no areas of focal weakness.  Vero Murillo core biopsy of right upper lobe lung mass:  -POSITIVE for adenocarcinoma; consistent with lung primary. Final Diagnosis:   A.  EBUS guided fine-needle aspiration of 4R lymph node:  -Adequate for evaluation.   -POSITIVE for metastatic adenocarcinom immunotherapy with Ipi/Nivo. With PDL-1 only 11-20%, I don not recommend Keyrtuda alone. She does not want to add the risk of chemo side effects to the risk of immunotherapy and has elected to proceed with Combined immunotherapy.   We reviewed the risks, b

## 2021-03-01 NOTE — PROGRESS NOTES
Pt here for follow up and treatment. Pt complains of fatigue, constipation, itching to her chest/back, dizziness and she states she is \"one big hot flash\" in the evening.

## 2021-03-01 NOTE — PROGRESS NOTES
Pt here for C3D1 opdivo/yervoy.   Arrives Ambulating independently, accompanied by Self           Patient reports possible pregnancy since last therapy cycle: No    Modifications in dose or schedule: No     Frequency of blood return and site check throughou

## 2021-03-15 ENCOUNTER — OFFICE VISIT (OUTPATIENT)
Dept: HEMATOLOGY/ONCOLOGY | Facility: HOSPITAL | Age: 66
End: 2021-03-15
Attending: INTERNAL MEDICINE
Payer: COMMERCIAL

## 2021-03-15 VITALS
WEIGHT: 117 LBS | HEART RATE: 77 BPM | RESPIRATION RATE: 16 BRPM | SYSTOLIC BLOOD PRESSURE: 116 MMHG | DIASTOLIC BLOOD PRESSURE: 75 MMHG | BODY MASS INDEX: 21 KG/M2 | OXYGEN SATURATION: 97 % | TEMPERATURE: 98 F

## 2021-03-15 DIAGNOSIS — C78.1 NON-SMALL CELL LUNG CANCER METASTATIC TO MEDIASTINUM (HCC): Primary | ICD-10-CM

## 2021-03-15 DIAGNOSIS — C34.90 NON-SMALL CELL LUNG CANCER METASTATIC TO MEDIASTINUM (HCC): Primary | ICD-10-CM

## 2021-03-15 LAB
ALBUMIN SERPL-MCNC: 3.6 G/DL (ref 3.4–5)
ALBUMIN/GLOB SERPL: 1 {RATIO} (ref 1–2)
ALP LIVER SERPL-CCNC: 74 U/L
ALT SERPL-CCNC: 22 U/L
ANION GAP SERPL CALC-SCNC: 8 MMOL/L (ref 0–18)
AST SERPL-CCNC: 18 U/L (ref 15–37)
BASOPHILS # BLD AUTO: 0.05 X10(3) UL (ref 0–0.2)
BASOPHILS NFR BLD AUTO: 0.6 %
BILIRUB SERPL-MCNC: 0.4 MG/DL (ref 0.1–2)
BUN BLD-MCNC: 20 MG/DL (ref 7–18)
BUN/CREAT SERPL: 33.9 (ref 10–20)
CALCIUM BLD-MCNC: 9.3 MG/DL (ref 8.5–10.1)
CHLORIDE SERPL-SCNC: 105 MMOL/L (ref 98–112)
CO2 SERPL-SCNC: 29 MMOL/L (ref 21–32)
CREAT BLD-MCNC: 0.59 MG/DL
DEPRECATED RDW RBC AUTO: 50.4 FL (ref 35.1–46.3)
EOSINOPHIL # BLD AUTO: 1.06 X10(3) UL (ref 0–0.7)
EOSINOPHIL NFR BLD AUTO: 13.4 %
ERYTHROCYTE [DISTWIDTH] IN BLOOD BY AUTOMATED COUNT: 13.9 % (ref 11–15)
GLOBULIN PLAS-MCNC: 3.7 G/DL (ref 2.8–4.4)
GLUCOSE BLD-MCNC: 79 MG/DL (ref 70–99)
HCT VFR BLD AUTO: 45.8 %
HGB BLD-MCNC: 14.7 G/DL
IMM GRANULOCYTES # BLD AUTO: 0.01 X10(3) UL (ref 0–1)
IMM GRANULOCYTES NFR BLD: 0.1 %
LYMPHOCYTES # BLD AUTO: 1.84 X10(3) UL (ref 1–4)
LYMPHOCYTES NFR BLD AUTO: 23.3 %
M PROTEIN MFR SERPL ELPH: 7.3 G/DL (ref 6.4–8.2)
MCH RBC QN AUTO: 31.5 PG (ref 26–34)
MCHC RBC AUTO-ENTMCNC: 32.1 G/DL (ref 31–37)
MCV RBC AUTO: 98.3 FL
MONOCYTES # BLD AUTO: 0.75 X10(3) UL (ref 0.1–1)
MONOCYTES NFR BLD AUTO: 9.5 %
NEUTROPHILS # BLD AUTO: 4.19 X10 (3) UL (ref 1.5–7.7)
NEUTROPHILS # BLD AUTO: 4.19 X10(3) UL (ref 1.5–7.7)
NEUTROPHILS NFR BLD AUTO: 53.1 %
OSMOLALITY SERPL CALC.SUM OF ELEC: 296 MOSM/KG (ref 275–295)
PATIENT FASTING Y/N/NP: NO
PLATELET # BLD AUTO: 233 10(3)UL (ref 150–450)
POTASSIUM SERPL-SCNC: 3.8 MMOL/L (ref 3.5–5.1)
RBC # BLD AUTO: 4.66 X10(6)UL
SODIUM SERPL-SCNC: 142 MMOL/L (ref 136–145)
WBC # BLD AUTO: 7.9 X10(3) UL (ref 4–11)

## 2021-03-15 PROCEDURE — 96413 CHEMO IV INFUSION 1 HR: CPT

## 2021-03-15 PROCEDURE — 36415 COLL VENOUS BLD VENIPUNCTURE: CPT

## 2021-03-15 PROCEDURE — 85025 COMPLETE CBC W/AUTO DIFF WBC: CPT

## 2021-03-15 PROCEDURE — 80053 COMPREHEN METABOLIC PANEL: CPT

## 2021-03-15 NOTE — PROGRESS NOTES
Pt here for [address].   Arrives Ambulating independently, accompanied by Self           Pregnancy screening: Denies possibility of pregnancy    Modifications in dose or schedule: No     Frequency of blood return and site check throughout administration:

## 2021-03-18 ENCOUNTER — TELEPHONE (OUTPATIENT)
Dept: HEMATOLOGY/ONCOLOGY | Facility: HOSPITAL | Age: 66
End: 2021-03-18

## 2021-03-18 NOTE — TELEPHONE ENCOUNTER
Bryan Balderramamorales says she has been getting treatment here, and is itchy, and has little red bumps and broke out in a rash under her right breast. Neil call

## 2021-03-18 NOTE — TELEPHONE ENCOUNTER
Toxicities: C1 D15 Nivolumab on 3/15/2021    Maculo-Papular Rash    Maculo-Papular Rash: Grade 1(Tatyana has had a red, raised, ithcy rash with little red bumps under her right breast for two weeks.  Over the last few days the rash has spread to the area bet

## 2021-03-29 ENCOUNTER — OFFICE VISIT (OUTPATIENT)
Dept: HEMATOLOGY/ONCOLOGY | Facility: HOSPITAL | Age: 66
End: 2021-03-29
Attending: INTERNAL MEDICINE
Payer: COMMERCIAL

## 2021-03-29 VITALS
OXYGEN SATURATION: 97 % | DIASTOLIC BLOOD PRESSURE: 75 MMHG | TEMPERATURE: 97 F | BODY MASS INDEX: 21.09 KG/M2 | SYSTOLIC BLOOD PRESSURE: 121 MMHG | WEIGHT: 119 LBS | HEIGHT: 62.99 IN | HEART RATE: 87 BPM

## 2021-03-29 DIAGNOSIS — Z29.8 IMMUNOTHERAPY ENCOUNTER: ICD-10-CM

## 2021-03-29 DIAGNOSIS — C34.90 NON-SMALL CELL LUNG CANCER METASTATIC TO MEDIASTINUM (HCC): Primary | ICD-10-CM

## 2021-03-29 DIAGNOSIS — R21 RASH: ICD-10-CM

## 2021-03-29 DIAGNOSIS — C78.1 NON-SMALL CELL LUNG CANCER METASTATIC TO MEDIASTINUM (HCC): Primary | ICD-10-CM

## 2021-03-29 LAB
ALBUMIN SERPL-MCNC: 3.5 G/DL (ref 3.4–5)
ALBUMIN/GLOB SERPL: 1.1 {RATIO} (ref 1–2)
ALP LIVER SERPL-CCNC: 72 U/L
ALT SERPL-CCNC: 24 U/L
ANION GAP SERPL CALC-SCNC: 3 MMOL/L (ref 0–18)
AST SERPL-CCNC: 20 U/L (ref 15–37)
BASOPHILS # BLD AUTO: 0.04 X10(3) UL (ref 0–0.2)
BASOPHILS NFR BLD AUTO: 0.4 %
BILIRUB SERPL-MCNC: 0.4 MG/DL (ref 0.1–2)
BUN BLD-MCNC: 20 MG/DL (ref 7–18)
BUN/CREAT SERPL: 35.7 (ref 10–20)
CALCIUM BLD-MCNC: 9.4 MG/DL (ref 8.5–10.1)
CHLORIDE SERPL-SCNC: 107 MMOL/L (ref 98–112)
CO2 SERPL-SCNC: 31 MMOL/L (ref 21–32)
CREAT BLD-MCNC: 0.56 MG/DL
DEPRECATED RDW RBC AUTO: 48.9 FL (ref 35.1–46.3)
EOSINOPHIL # BLD AUTO: 2.4 X10(3) UL (ref 0–0.7)
EOSINOPHIL NFR BLD AUTO: 24.8 %
ERYTHROCYTE [DISTWIDTH] IN BLOOD BY AUTOMATED COUNT: 13.9 % (ref 11–15)
GLOBULIN PLAS-MCNC: 3.3 G/DL (ref 2.8–4.4)
GLUCOSE BLD-MCNC: 93 MG/DL (ref 70–99)
HCT VFR BLD AUTO: 43.7 %
HGB BLD-MCNC: 14.3 G/DL
IMM GRANULOCYTES # BLD AUTO: 0.02 X10(3) UL (ref 0–1)
IMM GRANULOCYTES NFR BLD: 0.2 %
LYMPHOCYTES # BLD AUTO: 1.99 X10(3) UL (ref 1–4)
LYMPHOCYTES NFR BLD AUTO: 20.6 %
M PROTEIN MFR SERPL ELPH: 6.8 G/DL (ref 6.4–8.2)
MCH RBC QN AUTO: 31.3 PG (ref 26–34)
MCHC RBC AUTO-ENTMCNC: 32.7 G/DL (ref 31–37)
MCV RBC AUTO: 95.6 FL
MONOCYTES # BLD AUTO: 0.78 X10(3) UL (ref 0.1–1)
MONOCYTES NFR BLD AUTO: 8.1 %
NEUTROPHILS # BLD AUTO: 4.45 X10 (3) UL (ref 1.5–7.7)
NEUTROPHILS # BLD AUTO: 4.45 X10(3) UL (ref 1.5–7.7)
NEUTROPHILS NFR BLD AUTO: 45.9 %
OSMOLALITY SERPL CALC.SUM OF ELEC: 294 MOSM/KG (ref 275–295)
PATIENT FASTING Y/N/NP: NO
PLATELET # BLD AUTO: 234 10(3)UL (ref 150–450)
POTASSIUM SERPL-SCNC: 4.4 MMOL/L (ref 3.5–5.1)
RBC # BLD AUTO: 4.57 X10(6)UL
SODIUM SERPL-SCNC: 141 MMOL/L (ref 136–145)
TSI SER-ACNC: 1.6 MIU/ML (ref 0.36–3.74)
WBC # BLD AUTO: 9.7 X10(3) UL (ref 4–11)

## 2021-03-29 PROCEDURE — 96413 CHEMO IV INFUSION 1 HR: CPT

## 2021-03-29 PROCEDURE — 99215 OFFICE O/P EST HI 40 MIN: CPT | Performed by: NURSE PRACTITIONER

## 2021-03-29 PROCEDURE — 80053 COMPREHEN METABOLIC PANEL: CPT

## 2021-03-29 PROCEDURE — 85025 COMPLETE CBC W/AUTO DIFF WBC: CPT

## 2021-03-29 PROCEDURE — 84443 ASSAY THYROID STIM HORMONE: CPT

## 2021-03-29 PROCEDURE — 36415 COLL VENOUS BLD VENIPUNCTURE: CPT

## 2021-03-29 RX ORDER — BETAMETHASONE DIPROPIONATE 0.5 MG/G
1 CREAM TOPICAL 2 TIMES DAILY
Qty: 45 G | Refills: 2 | Status: SHIPPED | OUTPATIENT
Start: 2021-03-29 | End: 2021-05-24

## 2021-03-29 NOTE — PROGRESS NOTES
Patient presents with:  Rash Skin Problem: APN assessment - sick call    Pt is here for a sick call - skin rash. She is being treated today with C3 D29 opdivo.  She reports that at last visit with MD she has some itchiness and raised bumps under her right b

## 2021-03-29 NOTE — PROGRESS NOTES
ANP Visit Note    Patient Name: Ora Leyden   YOB: 1955   Medical Record Number: [id number]   CSN: [id number]   Date of visit: 3/29/2021       Chief Complaint/Reason for Visit:  APN evaluation of Rash     History of Present Illness: Grossly intact. Psych/Depression: mood and affect are appropriate.      Labs:     Recent Results (from the past 72 hour(s))   ASSAY, THYROID STIM HORMONE    Collection Time: 03/29/21 10:53 AM   Result Value Ref Range    TSH 1.600 0.358 - 3.740 mIU/mL   CO SCAN SLIDE    Collection Time: 03/29/21 10:53 AM   Result Value Ref Range    Slide Review       Slide reviewed, normal WBC, RBC, and platelet morphology.        Impression/Plan    Metastatic lung cancer: on Ipi and Nivo, okay to proceed with C3D29 today

## 2021-03-29 NOTE — PROGRESS NOTES
Patient evaluated today by TEDDY Gannon, due to worsening rash. Patient with rash mostly near right side/under breast area. Rash slightly on arms. Patient does not complain of pain but states rash is itchy.  Patient tried hydrocortisone cream with li

## 2021-04-05 ENCOUNTER — HOSPITAL ENCOUNTER (OUTPATIENT)
Dept: CT IMAGING | Age: 66
Discharge: HOME | End: 2021-04-05
Attending: INTERNAL MEDICINE
Payer: COMMERCIAL

## 2021-04-05 DIAGNOSIS — C78.1 NON-SMALL CELL LUNG CANCER METASTATIC TO MEDIASTINUM (HCC): ICD-10-CM

## 2021-04-05 DIAGNOSIS — C34.90 NON-SMALL CELL LUNG CANCER METASTATIC TO MEDIASTINUM (HCC): ICD-10-CM

## 2021-04-05 PROCEDURE — 74160 CT ABDOMEN W/CONTRAST: CPT | Performed by: INTERNAL MEDICINE

## 2021-04-05 PROCEDURE — 71260 CT THORAX DX C+: CPT | Performed by: INTERNAL MEDICINE

## 2021-04-12 ENCOUNTER — OFFICE VISIT (OUTPATIENT)
Dept: HEMATOLOGY/ONCOLOGY | Facility: HOSPITAL | Age: 66
End: 2021-04-12
Attending: INTERNAL MEDICINE
Payer: COMMERCIAL

## 2021-04-12 VITALS
WEIGHT: 118.81 LBS | BODY MASS INDEX: 21.05 KG/M2 | DIASTOLIC BLOOD PRESSURE: 75 MMHG | TEMPERATURE: 98 F | HEART RATE: 79 BPM | RESPIRATION RATE: 16 BRPM | SYSTOLIC BLOOD PRESSURE: 134 MMHG | OXYGEN SATURATION: 95 % | HEIGHT: 62.99 IN

## 2021-04-12 DIAGNOSIS — C34.90 NON-SMALL CELL LUNG CANCER METASTATIC TO MEDIASTINUM (HCC): Primary | ICD-10-CM

## 2021-04-12 DIAGNOSIS — C78.1 NON-SMALL CELL LUNG CANCER METASTATIC TO MEDIASTINUM (HCC): ICD-10-CM

## 2021-04-12 DIAGNOSIS — C34.90 NON-SMALL CELL LUNG CANCER METASTATIC TO MEDIASTINUM (HCC): ICD-10-CM

## 2021-04-12 DIAGNOSIS — C78.1 NON-SMALL CELL LUNG CANCER METASTATIC TO MEDIASTINUM (HCC): Primary | ICD-10-CM

## 2021-04-12 PROCEDURE — 96417 CHEMO IV INFUS EACH ADDL SEQ: CPT

## 2021-04-12 PROCEDURE — 96413 CHEMO IV INFUSION 1 HR: CPT

## 2021-04-12 PROCEDURE — 99215 OFFICE O/P EST HI 40 MIN: CPT | Performed by: INTERNAL MEDICINE

## 2021-04-12 NOTE — PROGRESS NOTES
Pt here for C4D1 Opdivo/Yervoy.   Arrives Ambulating independently, accompanied by Self           Pregnancy screening: Denies possibility of pregnancy    Modifications in dose or schedule: No     Frequency of blood return and site check throughout administr

## 2021-04-12 NOTE — PROGRESS NOTES
Pt here for follow up and treatment. Pt complains of some days feeling dizzy, fatigue, nausea and shortness of breath.

## 2021-04-12 NOTE — PROGRESS NOTES
Cancer Center Progress Note  Patient Name: Tracy Frazier   YOB: 1955   Medical Record Number: [id number]   CSN: [id number]   Attending Physician: Juan Pablo Bardales M.D.        Date of Visit: 3/1/2021       Chief Complaint:  Patient pre steadfastly refused to consider any further biopsy or workup. After discussing treatment options for presumed stage IV lung cancer, we settled on combined immunotherapy.     History of Present Illness:  Pt is here for follow up and D 1 of cycle 4 of combin Drug use: Never      Sexual activity: Not on file    Other Topics      Concerns:        Not on file    Social History Narrative      Not on file    Social Determinants of Health  Financial Resource Strain:       Difficulty of Paying Living Expenses:   Food or incontinence. Integumentary No rashes or yellowing of the skin   Neurologic No headache, blurred vision, and no areas of focal weakness. Normal gait. Psychiatric No insomnia, depression, olayinka or mood swings.        Vital Signs:  /75 (BP Locati Final Diagnosis:   A.  EBUS guided fine-needle aspiration of 4R lymph node:  -Adequate for evaluation. -POSITIVE for metastatic adenocarcinoma; consistent with known lung primary. -(See comment).     B.  EBUS guided fine-needle aspiration of left add the risk of chemo side effects to the risk of immunotherapy and has elected to proceed with Combined immunotherapy. We reviewed the risks, benefits, and side effects, and she agreed to proceed. She tolerated Cycle 1 with mild headache and dyspnea.  CT

## 2021-04-26 ENCOUNTER — OFFICE VISIT (OUTPATIENT)
Dept: HEMATOLOGY/ONCOLOGY | Facility: HOSPITAL | Age: 66
End: 2021-04-26
Attending: INTERNAL MEDICINE
Payer: COMMERCIAL

## 2021-04-26 VITALS
OXYGEN SATURATION: 98 % | TEMPERATURE: 98 F | DIASTOLIC BLOOD PRESSURE: 77 MMHG | BODY MASS INDEX: 21.05 KG/M2 | SYSTOLIC BLOOD PRESSURE: 121 MMHG | HEIGHT: 62.99 IN | WEIGHT: 118.81 LBS | RESPIRATION RATE: 17 BRPM | HEART RATE: 74 BPM

## 2021-04-26 DIAGNOSIS — C78.1 NON-SMALL CELL LUNG CANCER METASTATIC TO MEDIASTINUM (HCC): Primary | ICD-10-CM

## 2021-04-26 DIAGNOSIS — C34.90 NON-SMALL CELL LUNG CANCER METASTATIC TO MEDIASTINUM (HCC): Primary | ICD-10-CM

## 2021-04-26 PROCEDURE — 80053 COMPREHEN METABOLIC PANEL: CPT

## 2021-04-26 PROCEDURE — 96413 CHEMO IV INFUSION 1 HR: CPT

## 2021-04-26 PROCEDURE — 84443 ASSAY THYROID STIM HORMONE: CPT

## 2021-04-26 PROCEDURE — 85025 COMPLETE CBC W/AUTO DIFF WBC: CPT

## 2021-04-26 NOTE — PROGRESS NOTES
Pt here for C4D15.   Arrives Ambulating independently, accompanied by Self           Pregnancy screening: Denies possibility of pregnancy    Modifications in dose or schedule: No     Frequency of blood return and site check throughout administration: Prior

## 2021-05-10 ENCOUNTER — OFFICE VISIT (OUTPATIENT)
Dept: HEMATOLOGY/ONCOLOGY | Facility: HOSPITAL | Age: 66
End: 2021-05-10
Attending: INTERNAL MEDICINE
Payer: COMMERCIAL

## 2021-05-10 VITALS
OXYGEN SATURATION: 97 % | HEART RATE: 82 BPM | BODY MASS INDEX: 20.41 KG/M2 | HEIGHT: 62.99 IN | RESPIRATION RATE: 16 BRPM | SYSTOLIC BLOOD PRESSURE: 107 MMHG | DIASTOLIC BLOOD PRESSURE: 73 MMHG | TEMPERATURE: 97 F | WEIGHT: 115.19 LBS

## 2021-05-10 DIAGNOSIS — C34.90 NON-SMALL CELL LUNG CANCER METASTATIC TO MEDIASTINUM (HCC): Primary | ICD-10-CM

## 2021-05-10 DIAGNOSIS — C78.1 NON-SMALL CELL LUNG CANCER METASTATIC TO MEDIASTINUM (HCC): Primary | ICD-10-CM

## 2021-05-10 PROCEDURE — 85025 COMPLETE CBC W/AUTO DIFF WBC: CPT

## 2021-05-10 PROCEDURE — 96413 CHEMO IV INFUSION 1 HR: CPT

## 2021-05-10 PROCEDURE — 80053 COMPREHEN METABOLIC PANEL: CPT

## 2021-05-10 NOTE — PROGRESS NOTES
Pt. C/o itchiness and small bumps on the back of her scalp. I was unable to see anything representing a rash, but she states it is consistent with the rash she has spoken to md about in the past.  Requesting a prescription shampoo.   I spoke with NP who wi

## 2021-05-11 ENCOUNTER — TELEPHONE (OUTPATIENT)
Dept: HEMATOLOGY/ONCOLOGY | Facility: HOSPITAL | Age: 66
End: 2021-05-11

## 2021-05-11 ENCOUNTER — SOCIAL WORK SERVICES (OUTPATIENT)
Dept: HEMATOLOGY/ONCOLOGY | Facility: HOSPITAL | Age: 66
End: 2021-05-11

## 2021-05-11 NOTE — TELEPHONE ENCOUNTER
Jaqueline Wang, KP  P Edw Gage Cuenca Rns  Please call patient with the following recommendations:     Start with over the counter shampoo like Nizoral, T-gel, or Head and Shoulders.  Insurance will not cover prescription Rx unless we can document lory

## 2021-05-11 NOTE — PROGRESS NOTES
Sw spoke with patient and completed disability paperwork and faxed with supporting documents to Daria Camargo.

## 2021-05-24 ENCOUNTER — OFFICE VISIT (OUTPATIENT)
Dept: HEMATOLOGY/ONCOLOGY | Facility: HOSPITAL | Age: 66
End: 2021-05-24
Attending: INTERNAL MEDICINE
Payer: COMMERCIAL

## 2021-05-24 VITALS
SYSTOLIC BLOOD PRESSURE: 104 MMHG | TEMPERATURE: 98 F | OXYGEN SATURATION: 97 % | HEIGHT: 62.99 IN | DIASTOLIC BLOOD PRESSURE: 61 MMHG | WEIGHT: 115.19 LBS | HEART RATE: 76 BPM | RESPIRATION RATE: 16 BRPM | BODY MASS INDEX: 20.41 KG/M2

## 2021-05-24 DIAGNOSIS — C78.1 NON-SMALL CELL LUNG CANCER METASTATIC TO MEDIASTINUM (HCC): ICD-10-CM

## 2021-05-24 DIAGNOSIS — C78.1 NON-SMALL CELL LUNG CANCER METASTATIC TO MEDIASTINUM (HCC): Primary | ICD-10-CM

## 2021-05-24 DIAGNOSIS — Z29.8 IMMUNOTHERAPY ENCOUNTER: ICD-10-CM

## 2021-05-24 DIAGNOSIS — C34.90 NON-SMALL CELL LUNG CANCER METASTATIC TO MEDIASTINUM (HCC): ICD-10-CM

## 2021-05-24 DIAGNOSIS — C34.90 NON-SMALL CELL LUNG CANCER METASTATIC TO MEDIASTINUM (HCC): Primary | ICD-10-CM

## 2021-05-24 DIAGNOSIS — R21 RASH: ICD-10-CM

## 2021-05-24 PROCEDURE — 96417 CHEMO IV INFUS EACH ADDL SEQ: CPT

## 2021-05-24 PROCEDURE — 96413 CHEMO IV INFUSION 1 HR: CPT

## 2021-05-24 PROCEDURE — 99215 OFFICE O/P EST HI 40 MIN: CPT | Performed by: INTERNAL MEDICINE

## 2021-05-24 RX ORDER — BETAMETHASONE DIPROPIONATE 0.5 MG/G
1 CREAM TOPICAL 2 TIMES DAILY
Qty: 45 G | Refills: 2 | Status: SHIPPED | OUTPATIENT
Start: 2021-05-24 | End: 2021-06-23

## 2021-05-24 NOTE — PROGRESS NOTES
Pt here for C5D1 of Opdivo+Yervoy.   Arrives Ambulating independently, accompanied by Self           Pregnancy screening: Denies possibility of pregnancy    Modifications in dose or schedule: No     Frequency of blood return and site check throughout admini

## 2021-05-24 NOTE — PROGRESS NOTES
Cancer Center Progress Note  Patient Name: Tawana Brar   YOB: 1955   Medical Record Number: [id number]   CSN: [id number]   Attending Physician: Brent Flores M.D.        Date of Visit: 5/24/2021     Chief Complaint:  Patient pres steadfastly refused to consider any further biopsy or workup. After discussing treatment options for presumed stage IV lung cancer, we settled on combined immunotherapy.     History of Present Illness:  Pt is here for follow up and D 1 of cycle 5 of combin Determinants of Health  Financial Resource Strain:       Difficulty of Paying Living Expenses:   Food Insecurity:       Worried About [address] in the Last Year:       Ran Out of Food in the Last Year:   Transportation Needs:       Lack of Transp Psychiatric No insomnia, depression, olayinka or mood swings.        Vital Signs:  /61 (BP Location: Left arm, Patient Position: Sitting, Cuff Size: adult)   Pulse 76   Temp 98 °F (36.7 °C) (Temporal)   Resp 16   Ht 1.6 m (5' 2.99\")   Wt 52.3 kg (115 epithelial cells.     C.  Cytospin smears and cell block from bronchoalveolar lavage, left lower lobe of lung:  -Adequate for evaluation.  -No cytologic evidence of malignancy.  -Specimen composed of benign alveolar macrophages, respiratory epithelial cells immunotherapy. Continue prn Betamethasone cream. Trial of Neutrogena T-Gel for the scalp. Increase water intake. OK to try natural antihistamines. Constipation: Increase water and Miralax. Planned Follow Up:  6 weeks for MD labs and chemo.  Chemo

## 2021-05-26 VITALS — TEMPERATURE: 99.5 F

## 2021-06-07 ENCOUNTER — OFFICE VISIT (OUTPATIENT)
Dept: HEMATOLOGY/ONCOLOGY | Facility: HOSPITAL | Age: 66
End: 2021-06-07
Attending: INTERNAL MEDICINE
Payer: COMMERCIAL

## 2021-06-07 VITALS
TEMPERATURE: 98 F | OXYGEN SATURATION: 97 % | HEIGHT: 62.99 IN | HEART RATE: 92 BPM | RESPIRATION RATE: 16 BRPM | DIASTOLIC BLOOD PRESSURE: 67 MMHG | BODY MASS INDEX: 20.2 KG/M2 | WEIGHT: 114 LBS | SYSTOLIC BLOOD PRESSURE: 119 MMHG

## 2021-06-07 DIAGNOSIS — C78.1 NON-SMALL CELL LUNG CANCER METASTATIC TO MEDIASTINUM (HCC): Primary | ICD-10-CM

## 2021-06-07 DIAGNOSIS — C34.90 NON-SMALL CELL LUNG CANCER METASTATIC TO MEDIASTINUM (HCC): Primary | ICD-10-CM

## 2021-06-07 PROCEDURE — 36415 COLL VENOUS BLD VENIPUNCTURE: CPT

## 2021-06-07 PROCEDURE — 96413 CHEMO IV INFUSION 1 HR: CPT

## 2021-06-07 PROCEDURE — 80053 COMPREHEN METABOLIC PANEL: CPT

## 2021-06-07 PROCEDURE — 85025 COMPLETE CBC W/AUTO DIFF WBC: CPT

## 2021-06-07 NOTE — PROGRESS NOTES
Pt here for C5D15.   Arrives Ambulating independently, accompanied by Self          Modifications in dose or schedule: No    Pt denies current pregnancy     Frequency of blood return and site check throughout administration: Prior to administration   Karl

## 2021-06-21 ENCOUNTER — OFFICE VISIT (OUTPATIENT)
Dept: HEMATOLOGY/ONCOLOGY | Facility: HOSPITAL | Age: 66
End: 2021-06-21
Attending: INTERNAL MEDICINE
Payer: COMMERCIAL

## 2021-06-21 ENCOUNTER — TELEPHONE (OUTPATIENT)
Dept: ORTHOPEDICS CLINIC | Facility: CLINIC | Age: 66
End: 2021-06-21

## 2021-06-21 VITALS
HEART RATE: 98 BPM | RESPIRATION RATE: 18 BRPM | SYSTOLIC BLOOD PRESSURE: 123 MMHG | DIASTOLIC BLOOD PRESSURE: 74 MMHG | BODY MASS INDEX: 20.27 KG/M2 | TEMPERATURE: 98 F | OXYGEN SATURATION: 96 % | HEIGHT: 62.99 IN | WEIGHT: 114.38 LBS

## 2021-06-21 DIAGNOSIS — C78.1 NON-SMALL CELL LUNG CANCER METASTATIC TO MEDIASTINUM (HCC): Primary | ICD-10-CM

## 2021-06-21 DIAGNOSIS — M25.512 ACUTE PAIN OF BOTH SHOULDERS: ICD-10-CM

## 2021-06-21 DIAGNOSIS — C34.90 NON-SMALL CELL LUNG CANCER METASTATIC TO MEDIASTINUM (HCC): Primary | ICD-10-CM

## 2021-06-21 DIAGNOSIS — M25.511 BILATERAL SHOULDER PAIN, UNSPECIFIED CHRONICITY: Primary | ICD-10-CM

## 2021-06-21 DIAGNOSIS — M25.511 ACUTE PAIN OF BOTH SHOULDERS: ICD-10-CM

## 2021-06-21 DIAGNOSIS — K59.01 SLOW TRANSIT CONSTIPATION: ICD-10-CM

## 2021-06-21 DIAGNOSIS — M25.512 BILATERAL SHOULDER PAIN, UNSPECIFIED CHRONICITY: Primary | ICD-10-CM

## 2021-06-21 PROCEDURE — 84443 ASSAY THYROID STIM HORMONE: CPT

## 2021-06-21 PROCEDURE — 80053 COMPREHEN METABOLIC PANEL: CPT

## 2021-06-21 PROCEDURE — 96413 CHEMO IV INFUSION 1 HR: CPT

## 2021-06-21 PROCEDURE — 85025 COMPLETE CBC W/AUTO DIFF WBC: CPT

## 2021-06-21 PROCEDURE — 99215 OFFICE O/P EST HI 40 MIN: CPT | Performed by: INTERNAL MEDICINE

## 2021-06-21 NOTE — PROGRESS NOTES
Pt here for C5D29.   Arrives Ambulating independently, accompanied by Self           Pregnancy screening: Denies possibility of pregnancy    Modifications in dose or schedule: No     Frequency of blood return and site check throughout administration: Prior

## 2021-06-21 NOTE — TELEPHONE ENCOUNTER
Patient coming in for bilateral shoulder pain. Patient states that she has not had imaging done. If needed please place RX.     Future Appointments   Date Time Provider Arlin Rich   6/21/2021  9:00 AM Brooks [address] [place] [address]   6/23/2021 10:00

## 2021-06-21 NOTE — PROGRESS NOTES
Cancer Center Progress Note  Patient Name: Oval Libman   YOB: 1955   Medical Record Number: [id number]   CSN: [id number]   Attending Physician: Anjelica Aragon M.D.        Date of Visit: 6/21/2021       Chief Complaint:  No chief c steadfastly refused to consider any further biopsy or workup. After discussing treatment options for presumed stage IV lung cancer, we settled on combined immunotherapy.     History of Present Illness:  Pt is here for follow up and D 29 of cycle 5 of combi use: Not Currently      Drug use: Never      Sexual activity: Not on file    Other Topics      Concerns:        Not on file    Social History Narrative      Not on file    Social Determinants of Health  Financial Resource Strain:       Difficulty of Paying Integumentary No rashes or yellowing of the skin   Neurologic No headache, blurred vision, and no areas of focal weakness. Normal gait. Psychiatric No insomnia, depression, olayinka or mood swings.        Vital Signs:    Height: 160 cm (5' 2.99\") (06/21 0 with known lung primary. -(See comment). B.  EBUS guided fine-needle aspiration of left lower lobe lung mass:  -Adequate for evaluation.  -No cytologic evidence of malignancy.  -Specimen composed predominantly of benign bronchial epithelial cells.     C effects, and she agreed to proceed. She tolerated Cycle 1 with mild headache and dyspnea. CT after cycle 3 demonstrated response to therapy, but her new pain is suspicious for bone metastases. Will check CT C/A/P to reassess for progression of disease.  Pro

## 2021-06-21 NOTE — PATIENT INSTRUCTIONS
Claritin 10mg every night at bedtime. Advil every night at bedtime  Prilosec OTC every day on an empty stomach. Call to schedule CT scan.

## 2021-06-21 NOTE — TELEPHONE ENCOUNTER
Attempted to call Estefanía Sky regarding xray order/arrive early to have them completed. Reached voicemail, left voicemail and provided a detail message with my call back contact information.

## 2021-06-23 ENCOUNTER — HOSPITAL ENCOUNTER (OUTPATIENT)
Dept: GENERAL RADIOLOGY | Age: 66
Discharge: HOME | End: 2021-06-23
Attending: NURSE PRACTITIONER
Payer: COMMERCIAL

## 2021-06-23 ENCOUNTER — OFFICE VISIT (OUTPATIENT)
Dept: ORTHOPEDICS CLINIC | Facility: CLINIC | Age: 66
End: 2021-06-23
Payer: COMMERCIAL

## 2021-06-23 VITALS — OXYGEN SATURATION: 100 % | HEART RATE: 84 BPM

## 2021-06-23 DIAGNOSIS — M19.011 PRIMARY OSTEOARTHRITIS OF RIGHT SHOULDER: Primary | ICD-10-CM

## 2021-06-23 DIAGNOSIS — M25.512 CHRONIC PAIN OF BOTH SHOULDERS: ICD-10-CM

## 2021-06-23 DIAGNOSIS — M25.511 BILATERAL SHOULDER PAIN, UNSPECIFIED CHRONICITY: ICD-10-CM

## 2021-06-23 DIAGNOSIS — M19.012 PRIMARY OSTEOARTHRITIS OF LEFT SHOULDER: ICD-10-CM

## 2021-06-23 DIAGNOSIS — M25.511 CHRONIC PAIN OF BOTH SHOULDERS: ICD-10-CM

## 2021-06-23 DIAGNOSIS — M25.512 BILATERAL SHOULDER PAIN, UNSPECIFIED CHRONICITY: ICD-10-CM

## 2021-06-23 DIAGNOSIS — G89.29 CHRONIC PAIN OF BOTH SHOULDERS: ICD-10-CM

## 2021-06-23 PROCEDURE — 73030 X-RAY EXAM OF SHOULDER: CPT | Performed by: NURSE PRACTITIONER

## 2021-06-23 PROCEDURE — 99213 OFFICE O/P EST LOW 20 MIN: CPT | Performed by: NURSE PRACTITIONER

## 2021-06-23 RX ORDER — CELECOXIB 100 MG/1
100 CAPSULE ORAL DAILY
Qty: 30 CAPSULE | Refills: 0 | Status: SHIPPED | OUTPATIENT
Start: 2021-06-23 | End: 2021-06-29

## 2021-06-23 NOTE — PROGRESS NOTES
[facility] New Patient Note    CC: Patient presents with:  Shoulder Pain: bilateral shoulder pain- left side is worse    HPI: This 63year old female presents today with complaints of shoulder pain left worse than right.   She has had this shoulder pa Not Currently      Drug use: Never      Sexual activity: Not on file       ROS  Negative except as mentioned above      PHYSICAL EXAM  On physical exam this is an age-appropriate 61-year-old female who looks stated age no acute distress.   On evaluation of how she is proceeding.                 Calin ESPINAL  [address] [facility]  Phone [phone]  Fax [phone]

## 2021-06-28 ENCOUNTER — TELEPHONE (OUTPATIENT)
Dept: HEMATOLOGY/ONCOLOGY | Facility: HOSPITAL | Age: 66
End: 2021-06-28

## 2021-06-28 ENCOUNTER — HOSPITAL ENCOUNTER (OUTPATIENT)
Age: 66
Discharge: HOME | End: 2021-06-28
Attending: EMERGENCY MEDICINE
Payer: COMMERCIAL

## 2021-06-28 ENCOUNTER — HOSPITAL ENCOUNTER (OUTPATIENT)
Dept: CT IMAGING | Age: 66
Discharge: HOME | End: 2021-06-28
Attending: INTERNAL MEDICINE
Payer: COMMERCIAL

## 2021-06-28 VITALS
DIASTOLIC BLOOD PRESSURE: 83 MMHG | SYSTOLIC BLOOD PRESSURE: 134 MMHG | HEART RATE: 94 BPM | OXYGEN SATURATION: 99 % | RESPIRATION RATE: 12 BRPM | TEMPERATURE: 98 F

## 2021-06-28 DIAGNOSIS — C34.90 NON-SMALL CELL LUNG CANCER METASTATIC TO MEDIASTINUM (HCC): ICD-10-CM

## 2021-06-28 DIAGNOSIS — R50.9 FEVER, UNSPECIFIED FEVER CAUSE: Primary | ICD-10-CM

## 2021-06-28 DIAGNOSIS — M25.512 ACUTE PAIN OF BOTH SHOULDERS: ICD-10-CM

## 2021-06-28 DIAGNOSIS — M25.511 ACUTE PAIN OF BOTH SHOULDERS: ICD-10-CM

## 2021-06-28 DIAGNOSIS — C78.1 NON-SMALL CELL LUNG CANCER METASTATIC TO MEDIASTINUM (HCC): ICD-10-CM

## 2021-06-28 PROCEDURE — 81002 URINALYSIS NONAUTO W/O SCOPE: CPT | Performed by: EMERGENCY MEDICINE

## 2021-06-28 PROCEDURE — 74177 CT ABD & PELVIS W/CONTRAST: CPT | Performed by: INTERNAL MEDICINE

## 2021-06-28 PROCEDURE — 87086 URINE CULTURE/COLONY COUNT: CPT | Performed by: EMERGENCY MEDICINE

## 2021-06-28 PROCEDURE — 71260 CT THORAX DX C+: CPT | Performed by: INTERNAL MEDICINE

## 2021-06-28 PROCEDURE — 99203 OFFICE O/P NEW LOW 30 MIN: CPT

## 2021-06-28 PROCEDURE — 99214 OFFICE O/P EST MOD 30 MIN: CPT

## 2021-06-28 RX ORDER — PANTOPRAZOLE SODIUM 40 MG/1
40 TABLET, DELAYED RELEASE ORAL DAILY
Qty: 30 TABLET | Refills: 3 | Status: SHIPPED | OUTPATIENT
Start: 2021-06-28 | End: 2021-07-19

## 2021-06-28 RX ORDER — HYDROCODONE BITARTRATE AND ACETAMINOPHEN 5; 325 MG/1; MG/1
1-2 TABLET ORAL EVERY 4 HOURS PRN
Qty: 30 TABLET | Refills: 0 | Status: SHIPPED | OUTPATIENT
Start: 2021-06-28 | End: 2021-07-08

## 2021-06-28 NOTE — ED INITIAL ASSESSMENT (HPI)
Patient presents to IC with c/o chills,last night and fever 101. 5. Undergoing chemo. Cancer center requests covid test.

## 2021-06-28 NOTE — TELEPHONE ENCOUNTER
Toxicities: C5 D29 Nivolumab on 6/21/2021    Fever/Fatigue/Bilateral Shoulder Pain/Headache/Dyspnea    Fever: Grade 1/Fatigue: Grade 2/Dyspnea: Grade 1 (For the last week Wing Eunice has been having chills and low grade fever.  She just got back from a trip out Jacinda Cuenca's nurse.

## 2021-06-28 NOTE — ED PROVIDER NOTES
Patient Seen in: [facility]      History   Patient presents with:   Body ache and/or chills  Fever    Stated Complaint: pt is cancer patient /having pain in arms and had a fever last night     HPI/Subjective:   HPI    19-year-old female wh Used    Vaping Use      Vaping Use: Never used    Alcohol use: Not Currently    Drug use: Never             Review of Systems    Positive for stated complaint: pt is cancer patient /having pain in arms and had a fever last night   Other systems are as note scan and there is a area in the right upper lung is likely related to her cancer. There did not appear to be any acute infiltrative process. Urine dip was negative. The patient will need follow-up with her physicians as an outpatient.   Continue with the

## 2021-06-29 ENCOUNTER — TELEPHONE (OUTPATIENT)
Dept: HEMATOLOGY/ONCOLOGY | Facility: HOSPITAL | Age: 66
End: 2021-06-29

## 2021-06-29 ENCOUNTER — OFFICE VISIT (OUTPATIENT)
Dept: HEMATOLOGY/ONCOLOGY | Facility: HOSPITAL | Age: 66
End: 2021-06-29
Attending: INTERNAL MEDICINE
Payer: COMMERCIAL

## 2021-06-29 VITALS
OXYGEN SATURATION: 95 % | TEMPERATURE: 99 F | SYSTOLIC BLOOD PRESSURE: 109 MMHG | RESPIRATION RATE: 16 BRPM | HEART RATE: 107 BPM | WEIGHT: 116 LBS | DIASTOLIC BLOOD PRESSURE: 80 MMHG | HEIGHT: 62.99 IN | BODY MASS INDEX: 20.55 KG/M2

## 2021-06-29 DIAGNOSIS — R50.9 FEVER, UNSPECIFIED FEVER CAUSE: ICD-10-CM

## 2021-06-29 DIAGNOSIS — Z29.8 IMMUNOTHERAPY ENCOUNTER: ICD-10-CM

## 2021-06-29 DIAGNOSIS — R21 RASH: ICD-10-CM

## 2021-06-29 DIAGNOSIS — M25.511 ACUTE PAIN OF BOTH SHOULDERS: ICD-10-CM

## 2021-06-29 DIAGNOSIS — B37.0 ORAL CANDIDIASIS: ICD-10-CM

## 2021-06-29 DIAGNOSIS — M25.512 ACUTE PAIN OF BOTH SHOULDERS: ICD-10-CM

## 2021-06-29 DIAGNOSIS — C78.1 NON-SMALL CELL LUNG CANCER METASTATIC TO MEDIASTINUM (HCC): Primary | ICD-10-CM

## 2021-06-29 DIAGNOSIS — K59.01 SLOW TRANSIT CONSTIPATION: ICD-10-CM

## 2021-06-29 DIAGNOSIS — C34.90 NON-SMALL CELL LUNG CANCER METASTATIC TO MEDIASTINUM (HCC): Primary | ICD-10-CM

## 2021-06-29 PROCEDURE — 99215 OFFICE O/P EST HI 40 MIN: CPT | Performed by: NURSE PRACTITIONER

## 2021-06-29 NOTE — PROGRESS NOTES
ANP Visit Note    Patient Name: Srini Morgan   YOB: 1955   Medical Record Number: [id number]   CSN: [id number]   Date of visit: 6/29/2021       Chief Complaint/Reason for Visit:  Patient presents with:  Fever: APN assessment - sick call HYDROcodone-acetaminophen 5-325 MG Oral Tab, Take 1-2 tablets by mouth every 4 (four) hours as needed for Pain., Disp: 30 tablet, Rfl: 0  •  Pantoprazole Sodium 40 MG Oral Tab EC, Take 1 tablet (40 mg total) by mouth daily. , Disp: 30 tablet, Rfl: 3  •  Mul POCT urinalysis dipstick    Collection Time: 06/28/21 12:59 PM   Result Value Ref Range    Urine Color Yellow Yellow, Pink, Light yellow, Blue, Other, Dk Yellow    Urine Clarity Clear Clear, Hazy, Opaque    Glucose, Urine Negative Negative mg/dL    Bilir - 37.0 g/dL    RDW 12.7 11.0 - 15.0 %    RDW-SD 45.5 35.1 - 46.3 fL    Neutrophil Absolute Prelim 7.71 (H) 1.50 - 7.70 x10 (3) uL    Neutrophil Absolute 7.71 (H) 1.50 - 7.70 x10(3) uL    Lymphocyte Absolute 1.30 1.00 - 4.00 x10(3) uL    Monocyte Absolute 1  No mass or effusion.     CHEST WALL:  No mass or axillary adenopathy.     AORTA:  No aneurysm or dissection.     VASCULATURE:  No visible pulmonary arterial thrombus or attenuation.         ABDOMEN/PELVIS:   LIVER:  No significant hepatic abnormality.    B left upper lobe also significantly smaller when compared to the prior study.       Negative below the diaphragm.       Noted however is a left adnexal mass presumed a prominent left ovary measuring 2.7 x 3.6 cm unchanged from the PET scan of 9/22/2020 with

## 2021-06-29 NOTE — PROGRESS NOTES
Patient presents with:  Fever: APN assessment - sick call    Pt is here for a sick call - recurrent fevers at home. She was last treated on 6/21/21 C5 D29 nivolumab.  Pt states she has been feeling ill for over a month; has had chills over the last few week

## 2021-06-29 NOTE — TELEPHONE ENCOUNTER
Alexis Roque called in to report that she is still running fevers since [facility]. Most recent fever at 1:30 pm today was 101.3. She has not taken any Advil since this morning. She denies cold & flu symptoms. No dysuria.  Patient had a neg

## 2021-07-02 ENCOUNTER — TELEPHONE (OUTPATIENT)
Dept: HEMATOLOGY/ONCOLOGY | Facility: HOSPITAL | Age: 66
End: 2021-07-02

## 2021-07-02 ENCOUNTER — SOCIAL WORK SERVICES (OUTPATIENT)
Dept: HEMATOLOGY/ONCOLOGY | Facility: HOSPITAL | Age: 66
End: 2021-07-02

## 2021-07-02 DIAGNOSIS — C78.1 NON-SMALL CELL LUNG CANCER METASTATIC TO MEDIASTINUM (HCC): Primary | ICD-10-CM

## 2021-07-02 DIAGNOSIS — C34.90 NON-SMALL CELL LUNG CANCER METASTATIC TO MEDIASTINUM (HCC): Primary | ICD-10-CM

## 2021-07-02 NOTE — TELEPHONE ENCOUNTER
Leti Beebe, she had been having fevers and had cultures earlier in week, she was wondering about results, and she is still having fevers today's is 102. 1.   Denies sob or chest pain, no nausea, vomiting or diarrhea, occasional lightheaded but same, eatin

## 2021-07-02 NOTE — TELEPHONE ENCOUNTER
Per Dr. Sary Ng -- cultures negative, continue to take tylenol, push fluids and rest.  Pt informed and verbalized understanding.

## 2021-07-06 ENCOUNTER — OFFICE VISIT (OUTPATIENT)
Dept: HEMATOLOGY/ONCOLOGY | Facility: HOSPITAL | Age: 66
End: 2021-07-06
Attending: INTERNAL MEDICINE
Payer: COMMERCIAL

## 2021-07-06 VITALS
TEMPERATURE: 98 F | DIASTOLIC BLOOD PRESSURE: 68 MMHG | WEIGHT: 115 LBS | BODY MASS INDEX: 20.37 KG/M2 | RESPIRATION RATE: 16 BRPM | OXYGEN SATURATION: 97 % | HEIGHT: 62.99 IN | SYSTOLIC BLOOD PRESSURE: 105 MMHG | HEART RATE: 104 BPM

## 2021-07-06 DIAGNOSIS — C34.90 NON-SMALL CELL LUNG CANCER METASTATIC TO MEDIASTINUM (HCC): Primary | ICD-10-CM

## 2021-07-06 DIAGNOSIS — C78.1 NON-SMALL CELL LUNG CANCER METASTATIC TO MEDIASTINUM (HCC): Primary | ICD-10-CM

## 2021-07-06 DIAGNOSIS — M19.019 ARTHRITIS, SHOULDER REGION: ICD-10-CM

## 2021-07-06 DIAGNOSIS — R21 RASH: ICD-10-CM

## 2021-07-06 LAB
ALBUMIN SERPL-MCNC: 2.6 G/DL (ref 3.4–5)
ALBUMIN/GLOB SERPL: 0.6 {RATIO} (ref 1–2)
ALP LIVER SERPL-CCNC: 66 U/L
ALT SERPL-CCNC: 15 U/L
ANION GAP SERPL CALC-SCNC: 1 MMOL/L (ref 0–18)
AST SERPL-CCNC: 15 U/L (ref 15–37)
BASOPHILS # BLD AUTO: 0.05 X10(3) UL (ref 0–0.2)
BASOPHILS NFR BLD AUTO: 0.4 %
BILIRUB SERPL-MCNC: 0.4 MG/DL (ref 0.1–2)
BUN BLD-MCNC: 11 MG/DL (ref 7–18)
BUN/CREAT SERPL: 19 (ref 10–20)
CALCIUM BLD-MCNC: 8.9 MG/DL (ref 8.5–10.1)
CHLORIDE SERPL-SCNC: 102 MMOL/L (ref 98–112)
CO2 SERPL-SCNC: 31 MMOL/L (ref 21–32)
CREAT BLD-MCNC: 0.58 MG/DL
DEPRECATED RDW RBC AUTO: 44.8 FL (ref 35.1–46.3)
EOSINOPHIL # BLD AUTO: 1.11 X10(3) UL (ref 0–0.7)
EOSINOPHIL NFR BLD AUTO: 9.4 %
ERYTHROCYTE [DISTWIDTH] IN BLOOD BY AUTOMATED COUNT: 12.7 % (ref 11–15)
GLOBULIN PLAS-MCNC: 4.5 G/DL (ref 2.8–4.4)
GLUCOSE BLD-MCNC: 118 MG/DL (ref 70–99)
HCT VFR BLD AUTO: 34.5 %
HGB BLD-MCNC: 11.4 G/DL
IMM GRANULOCYTES # BLD AUTO: 0.04 X10(3) UL (ref 0–1)
IMM GRANULOCYTES NFR BLD: 0.3 %
LYMPHOCYTES # BLD AUTO: 0.91 X10(3) UL (ref 1–4)
LYMPHOCYTES NFR BLD AUTO: 7.7 %
M PROTEIN MFR SERPL ELPH: 7.1 G/DL (ref 6.4–8.2)
MCH RBC QN AUTO: 31.5 PG (ref 26–34)
MCHC RBC AUTO-ENTMCNC: 33 G/DL (ref 31–37)
MCV RBC AUTO: 95.3 FL
MONOCYTES # BLD AUTO: 1.27 X10(3) UL (ref 0.1–1)
MONOCYTES NFR BLD AUTO: 10.8 %
NEUTROPHILS # BLD AUTO: 8.37 X10 (3) UL (ref 1.5–7.7)
NEUTROPHILS # BLD AUTO: 8.37 X10(3) UL (ref 1.5–7.7)
NEUTROPHILS NFR BLD AUTO: 71.4 %
OSMOLALITY SERPL CALC.SUM OF ELEC: 278 MOSM/KG (ref 275–295)
PLATELET # BLD AUTO: 507 10(3)UL (ref 150–450)
POTASSIUM SERPL-SCNC: 4.5 MMOL/L (ref 3.5–5.1)
RBC # BLD AUTO: 3.62 X10(6)UL
SODIUM SERPL-SCNC: 134 MMOL/L (ref 136–145)
WBC # BLD AUTO: 11.8 X10(3) UL (ref 4–11)

## 2021-07-06 PROCEDURE — 99215 OFFICE O/P EST HI 40 MIN: CPT | Performed by: INTERNAL MEDICINE

## 2021-07-06 PROCEDURE — 36415 COLL VENOUS BLD VENIPUNCTURE: CPT

## 2021-07-06 RX ORDER — LORATADINE 10 MG/1
10 TABLET ORAL DAILY
COMMUNITY
End: 2021-07-19

## 2021-07-06 NOTE — PROGRESS NOTES
Pt here for follow up and treatment. Pt complains of fatigue, constipation and fevers. Max temp yesterday 101.6. She takes norco and advil for her fevers.

## 2021-07-06 NOTE — PROGRESS NOTES
Cancer Center Progress Note  Patient Name: Srini Morgan   YOB: 1955   Medical Record Number: [id number]   CSN: [id number]   Attending Physician: Mel Riley M.D.        Date of Visit: 7/6/2021     Chief Complaint:  Patient prese steadfastly refused to consider any further biopsy or workup. After discussing treatment options for presumed stage IV lung cancer, we settled on combined immunotherapy.     History of Present Illness:  Pt is here for follow up and D 1 of cycle 6 of combin file    Social Determinants of Health  Financial Resource Strain:       Difficulty of Paying Living Expenses:   Food Insecurity:       Worried About Running Out of Food in the Last Year:       Ran Out of Food in the Last Year:   Transportation Needs:       Genitorurinary  No hematuria, dysuria, abnormal bleeding, or incontinence. Integumentary No rashes or yellowing of the skin   Neurologic No headache, blurred vision, and no areas of focal weakness. Normal gait.    Psychiatric No insomnia, depression, ma Pathology:  Final Diagnosis:   Needle core biopsy of right upper lobe lung mass:  -POSITIVE for adenocarcinoma; consistent with lung primary.            Final Diagnosis:   A.  EBUS guided fine-needle aspiration of 4R lymph node:  -Adequate for evaluatio and 2 cycles of chemotherapy, or just immunotherapy with Ipi/Nivo. With PDL-1 only 11-20%, I don not recommend Keyrtuda alone.   She does not want to add the risk of chemo side effects to the risk of immunotherapy and has elected to proceed with Combined im

## 2021-07-08 RX ORDER — HYDROCODONE BITARTRATE AND ACETAMINOPHEN 5; 325 MG/1; MG/1
1-2 TABLET ORAL EVERY 4 HOURS PRN
Qty: 30 TABLET | Refills: 0 | Status: SHIPPED | OUTPATIENT
Start: 2021-07-08 | End: 2021-07-19

## 2021-07-08 NOTE — TELEPHONE ENCOUNTER
Patient need a refill on her Norco, pplease send to [address] [place] [address] in [place] on [address].

## 2021-07-09 ENCOUNTER — DOCUMENTATION ONLY (OUTPATIENT)
Dept: HEMATOLOGY/ONCOLOGY | Facility: HOSPITAL | Age: 66
End: 2021-07-09

## 2021-07-09 NOTE — PROGRESS NOTES
Nutrition f/u screen complete as triggered by [facility] dx of NSCLC. Chart reviewed. Pt appears nutritionally stable at present. RD available on consult.

## 2021-07-12 ENCOUNTER — TELEPHONE (OUTPATIENT)
Dept: ORTHOPEDICS CLINIC | Facility: CLINIC | Age: 66
End: 2021-07-12

## 2021-07-12 NOTE — TELEPHONE ENCOUNTER
Bilateral shoulder pain is getting worse and is radiating into her neck and down her arms into her fingers. Lump is still in the left shoulder and another is developing in the right shoulder. Cancer cultures and screenings are negative.  How should she proc

## 2021-07-12 NOTE — TELEPHONE ENCOUNTER
Spoke with TEDDY Oviedo as pt was stating she has concerns regarding the lumps in her shoulder. He stated that he would recommend the patient be seen by Dr. Della Horvath with [facility], as it is her specialty.  Pt called and given the recommendatio

## 2021-07-14 ENCOUNTER — TELEPHONE (OUTPATIENT)
Dept: ORTHOPEDICS CLINIC | Facility: CLINIC | Age: 66
End: 2021-07-14

## 2021-07-14 NOTE — TELEPHONE ENCOUNTER
Patient coming in for bilateral shoulder osteoarthritis. Patient had x-ray's done, if needed please place Rx.   Future Appointments   Date Time Provider Arlin Rich   7/19/2021  8:30 AM [address] [place] [address]   7/19/2021  9:00 AM Shanti Pickard

## 2021-07-19 ENCOUNTER — OFFICE VISIT (OUTPATIENT)
Dept: HEMATOLOGY/ONCOLOGY | Facility: HOSPITAL | Age: 66
End: 2021-07-19
Attending: INTERNAL MEDICINE
Payer: COMMERCIAL

## 2021-07-19 VITALS
TEMPERATURE: 98 F | RESPIRATION RATE: 16 BRPM | BODY MASS INDEX: 19.14 KG/M2 | SYSTOLIC BLOOD PRESSURE: 116 MMHG | HEIGHT: 62.99 IN | DIASTOLIC BLOOD PRESSURE: 76 MMHG | OXYGEN SATURATION: 99 % | WEIGHT: 108 LBS | HEART RATE: 88 BPM

## 2021-07-19 DIAGNOSIS — C34.90 NON-SMALL CELL LUNG CANCER METASTATIC TO MEDIASTINUM (HCC): Primary | ICD-10-CM

## 2021-07-19 DIAGNOSIS — C78.1 NON-SMALL CELL LUNG CANCER METASTATIC TO MEDIASTINUM (HCC): Primary | ICD-10-CM

## 2021-07-19 LAB
ALBUMIN SERPL-MCNC: 2.4 G/DL (ref 3.4–5)
ALBUMIN/GLOB SERPL: 0.5 {RATIO} (ref 1–2)
ALP LIVER SERPL-CCNC: 72 U/L
ALT SERPL-CCNC: 15 U/L
ANION GAP SERPL CALC-SCNC: 5 MMOL/L (ref 0–18)
AST SERPL-CCNC: 15 U/L (ref 15–37)
BASOPHILS # BLD AUTO: 0.04 X10(3) UL (ref 0–0.2)
BASOPHILS NFR BLD AUTO: 0.3 %
BILIRUB SERPL-MCNC: 0.3 MG/DL (ref 0.1–2)
BUN BLD-MCNC: 10 MG/DL (ref 7–18)
BUN/CREAT SERPL: 21.3 (ref 10–20)
CALCIUM BLD-MCNC: 9 MG/DL (ref 8.5–10.1)
CHLORIDE SERPL-SCNC: 101 MMOL/L (ref 98–112)
CO2 SERPL-SCNC: 29 MMOL/L (ref 21–32)
CREAT BLD-MCNC: 0.47 MG/DL
DEPRECATED RDW RBC AUTO: 44.2 FL (ref 35.1–46.3)
EOSINOPHIL # BLD AUTO: 0.63 X10(3) UL (ref 0–0.7)
EOSINOPHIL NFR BLD AUTO: 5.1 %
ERYTHROCYTE [DISTWIDTH] IN BLOOD BY AUTOMATED COUNT: 13.2 % (ref 11–15)
GLOBULIN PLAS-MCNC: 4.7 G/DL (ref 2.8–4.4)
GLUCOSE BLD-MCNC: 117 MG/DL (ref 70–99)
HCT VFR BLD AUTO: 32.1 %
HGB BLD-MCNC: 10.5 G/DL
IMM GRANULOCYTES # BLD AUTO: 0.06 X10(3) UL (ref 0–1)
IMM GRANULOCYTES NFR BLD: 0.5 %
LYMPHOCYTES # BLD AUTO: 1.02 X10(3) UL (ref 1–4)
LYMPHOCYTES NFR BLD AUTO: 8.3 %
M PROTEIN MFR SERPL ELPH: 7.1 G/DL (ref 6.4–8.2)
MCH RBC QN AUTO: 30.1 PG (ref 26–34)
MCHC RBC AUTO-ENTMCNC: 32.7 G/DL (ref 31–37)
MCV RBC AUTO: 92 FL
MONOCYTES # BLD AUTO: 1.15 X10(3) UL (ref 0.1–1)
MONOCYTES NFR BLD AUTO: 9.3 %
NEUTROPHILS # BLD AUTO: 9.4 X10 (3) UL (ref 1.5–7.7)
NEUTROPHILS # BLD AUTO: 9.4 X10(3) UL (ref 1.5–7.7)
NEUTROPHILS NFR BLD AUTO: 76.5 %
OSMOLALITY SERPL CALC.SUM OF ELEC: 280 MOSM/KG (ref 275–295)
PATIENT FASTING Y/N/NP: NO
PLATELET # BLD AUTO: 657 10(3)UL (ref 150–450)
POTASSIUM SERPL-SCNC: 4.2 MMOL/L (ref 3.5–5.1)
RBC # BLD AUTO: 3.49 X10(6)UL
SODIUM SERPL-SCNC: 135 MMOL/L (ref 136–145)
WBC # BLD AUTO: 12.3 X10(3) UL (ref 4–11)

## 2021-07-19 PROCEDURE — 36415 COLL VENOUS BLD VENIPUNCTURE: CPT

## 2021-07-19 PROCEDURE — 80053 COMPREHEN METABOLIC PANEL: CPT

## 2021-07-19 PROCEDURE — 85025 COMPLETE CBC W/AUTO DIFF WBC: CPT

## 2021-07-19 PROCEDURE — 99215 OFFICE O/P EST HI 40 MIN: CPT | Performed by: INTERNAL MEDICINE

## 2021-07-19 RX ORDER — HYDROCODONE BITARTRATE AND ACETAMINOPHEN 5; 325 MG/1; MG/1
1-2 TABLET ORAL EVERY 4 HOURS PRN
Qty: 30 TABLET | Refills: 0 | Status: SHIPPED | OUTPATIENT
Start: 2021-07-19 | End: 2021-07-29

## 2021-07-19 NOTE — PROGRESS NOTES
Cancer Center Progress Note  Patient Name: Oval Libman   YOB: 1955   Medical Record Number: [id number]   CSN: [id number]   Attending Physician: Anjelica Aragon M.D.        Date of Visit: 7/19/2021       Chief Complaint:  No chief c steadfastly refused to consider any further biopsy or workup. After discussing treatment options for presumed stage IV lung cancer, we settled on combined immunotherapy. History of Present Illness:  Pt is here for follow up.  Immunotherapy was held 2 we Difficulty of Paying Living Expenses:   Food Insecurity:       Worried About [address] in the Last Year:       Ran Out of Food in the Last Year:   Transportation Needs:       Lack of Transportation (Medical):       Lack of Transportation (Non-Med Genitorurinary  No hematuria, dysuria, abnormal bleeding, or incontinence. Integumentary yellowing of the skin   Neurologic No headache, blurred vision, and no areas of focal weakness. Normal gait.    Psychiatric No insomnia, depression, olayinka or mood s Pathology:  Final Diagnosis:   Needle core biopsy of right upper lobe lung mass:  -POSITIVE for adenocarcinoma; consistent with lung primary.            Final Diagnosis:   A.  EBUS guided fine-needle aspiration of 4R lymph node:  -Adequate for evaluatio and 2 cycles of chemotherapy, or just immunotherapy with Ipi/Nivo. With PDL-1 only 11-20%, I don not recommend Keyrtuda alone.   She does not want to add the risk of chemo side effects to the risk of immunotherapy and has elected to proceed with Combined im

## 2021-07-19 NOTE — PROGRESS NOTES
Outpatient Oncology Care Plan  Problem list:  pain  fatigue  rash, fevers    Problems related to:    side effect of treatment    Interventions:  provided general teaching    Expected outcomes:  symptoms relieved/minimized  understands plan of care  verbali

## 2021-07-29 RX ORDER — HYDROCODONE BITARTRATE AND ACETAMINOPHEN 5; 325 MG/1; MG/1
TABLET ORAL
Qty: 30 TABLET | Refills: 0 | Status: SHIPPED | OUTPATIENT
Start: 2021-07-29 | End: 2021-11-15

## 2021-08-02 ENCOUNTER — OFFICE VISIT (OUTPATIENT)
Dept: HEMATOLOGY/ONCOLOGY | Facility: HOSPITAL | Age: 66
End: 2021-08-02
Attending: INTERNAL MEDICINE
Payer: COMMERCIAL

## 2021-08-02 ENCOUNTER — HOSPITAL ENCOUNTER (OUTPATIENT)
Dept: ULTRASOUND IMAGING | Facility: HOSPITAL | Age: 66
Discharge: HOME | End: 2021-08-02
Attending: INTERNAL MEDICINE
Payer: COMMERCIAL

## 2021-08-02 VITALS
BODY MASS INDEX: 19 KG/M2 | HEART RATE: 84 BPM | SYSTOLIC BLOOD PRESSURE: 105 MMHG | RESPIRATION RATE: 18 BRPM | WEIGHT: 110 LBS | TEMPERATURE: 97 F | OXYGEN SATURATION: 97 % | DIASTOLIC BLOOD PRESSURE: 70 MMHG

## 2021-08-02 DIAGNOSIS — C34.90 NON-SMALL CELL LUNG CANCER METASTATIC TO MEDIASTINUM (HCC): Primary | ICD-10-CM

## 2021-08-02 DIAGNOSIS — L27.0 RASH, DRUG: ICD-10-CM

## 2021-08-02 DIAGNOSIS — C78.1 NON-SMALL CELL LUNG CANCER METASTATIC TO MEDIASTINUM (HCC): Primary | ICD-10-CM

## 2021-08-02 DIAGNOSIS — M12.9 ARTHRITIS, MULTIPLE JOINT INVOLVEMENT: ICD-10-CM

## 2021-08-02 DIAGNOSIS — R60.0 LOWER EXTREMITY EDEMA: ICD-10-CM

## 2021-08-02 LAB
ALBUMIN SERPL-MCNC: 2.7 G/DL (ref 3.4–5)
ALBUMIN/GLOB SERPL: 0.6 {RATIO} (ref 1–2)
ALP LIVER SERPL-CCNC: 78 U/L
ALT SERPL-CCNC: 16 U/L
ANION GAP SERPL CALC-SCNC: 1 MMOL/L (ref 0–18)
AST SERPL-CCNC: 12 U/L (ref 15–37)
BASOPHILS # BLD AUTO: 0.05 X10(3) UL (ref 0–0.2)
BASOPHILS NFR BLD AUTO: 0.5 %
BILIRUB SERPL-MCNC: 0.3 MG/DL (ref 0.1–2)
BUN BLD-MCNC: 8 MG/DL (ref 7–18)
CALCIUM BLD-MCNC: 9.1 MG/DL (ref 8.5–10.1)
CHLORIDE SERPL-SCNC: 105 MMOL/L (ref 98–112)
CO2 SERPL-SCNC: 31 MMOL/L (ref 21–32)
CREAT BLD-MCNC: 0.45 MG/DL
EOSINOPHIL # BLD AUTO: 0.68 X10(3) UL (ref 0–0.7)
EOSINOPHIL NFR BLD AUTO: 6.2 %
ERYTHROCYTE [DISTWIDTH] IN BLOOD BY AUTOMATED COUNT: 13.9 %
GLOBULIN PLAS-MCNC: 4.8 G/DL (ref 2.8–4.4)
GLUCOSE BLD-MCNC: 99 MG/DL (ref 70–99)
HCT VFR BLD AUTO: 31.3 %
HGB BLD-MCNC: 10 G/DL
IMM GRANULOCYTES # BLD AUTO: 0.05 X10(3) UL (ref 0–1)
IMM GRANULOCYTES NFR BLD: 0.5 %
LYMPHOCYTES # BLD AUTO: 1.2 X10(3) UL (ref 1–4)
LYMPHOCYTES NFR BLD AUTO: 10.9 %
M PROTEIN MFR SERPL ELPH: 7.5 G/DL (ref 6.4–8.2)
MCH RBC QN AUTO: 29.4 PG (ref 26–34)
MCHC RBC AUTO-ENTMCNC: 31.9 G/DL (ref 31–37)
MCV RBC AUTO: 92.1 FL
MONOCYTES # BLD AUTO: 1.11 X10(3) UL (ref 0.1–1)
MONOCYTES NFR BLD AUTO: 10.1 %
NEUTROPHILS # BLD AUTO: 7.88 X10 (3) UL (ref 1.5–7.7)
NEUTROPHILS # BLD AUTO: 7.88 X10(3) UL (ref 1.5–7.7)
NEUTROPHILS NFR BLD AUTO: 71.8 %
OSMOLALITY SERPL CALC.SUM OF ELEC: 282 MOSM/KG (ref 275–295)
PLATELET # BLD AUTO: 561 10(3)UL (ref 150–450)
POTASSIUM SERPL-SCNC: 4.6 MMOL/L (ref 3.5–5.1)
RBC # BLD AUTO: 3.4 X10(6)UL
SODIUM SERPL-SCNC: 137 MMOL/L (ref 136–145)
WBC # BLD AUTO: 11 X10(3) UL (ref 4–11)

## 2021-08-02 PROCEDURE — 93971 EXTREMITY STUDY: CPT | Performed by: INTERNAL MEDICINE

## 2021-08-02 PROCEDURE — 99215 OFFICE O/P EST HI 40 MIN: CPT | Performed by: INTERNAL MEDICINE

## 2021-08-02 RX ORDER — IBUPROFEN 200 MG
600 TABLET ORAL EVERY MORNING
COMMUNITY
End: 2022-01-21

## 2021-08-02 NOTE — PROGRESS NOTES
Cancer Center Progress Note  Patient Name: Monet Gibson   YOB: 1955   Medical Record Number: [id number]   CSN: [id number]   Attending Physician: Trish Gan M.D.        Date of Visit: 8/2/2021     Chief Complaint:  Patient prese steadfastly refused to consider any further biopsy or workup. After discussing treatment options for presumed stage IV lung cancer, we settled on combined immunotherapy. History of Present Illness:  Pt is here for follow up.  Immunotherapy was held 6 we activity: Not on file    Other Topics      Concerns:        Not on file    Social History Narrative      Not on file    Social Determinants of Health  Financial Resource Strain:       Difficulty of Paying Living Expenses:   Food Insecurity:       Worried A focal weakness. Normal gait. Psychiatric No insomnia, depression, olayinka or mood swings.        Vital Signs:    Height: --  Weight: 49.9 kg (110 lb) (08/02 0844)  BSA (Calculated - sq m): --  Pulse: 84 (08/02 0844)  BP: 105/70 (08/02 0844)  Temp: 97.4 °F ( of 4R lymph node:  -Adequate for evaluation. -POSITIVE for metastatic adenocarcinoma; consistent with known lung primary. -(See comment).     B.  EBUS guided fine-needle aspiration of left lower lobe lung mass:  -Adequate for evaluation.  -No cytologic ev and has elected to proceed with Combined immunotherapy. We reviewed the risks, benefits, and side effects, and she agreed to proceed. She complained of fatigue and fever that were intolerable, prompting holding therapy.  Her CT demonstrated response to the

## 2021-08-21 ENCOUNTER — HOSPITAL ENCOUNTER (OUTPATIENT)
Dept: CT IMAGING | Age: 66
End: 2021-08-21
Attending: INTERNAL MEDICINE
Payer: COMMERCIAL

## 2021-08-21 ENCOUNTER — HOSPITAL ENCOUNTER (OUTPATIENT)
Dept: CT IMAGING | Facility: HOSPITAL | Age: 66
Discharge: HOME | End: 2021-08-21
Attending: INTERNAL MEDICINE
Payer: COMMERCIAL

## 2021-08-21 DIAGNOSIS — C78.1 NON-SMALL CELL LUNG CANCER METASTATIC TO MEDIASTINUM (HCC): ICD-10-CM

## 2021-08-21 DIAGNOSIS — C34.90 NON-SMALL CELL LUNG CANCER METASTATIC TO MEDIASTINUM (HCC): ICD-10-CM

## 2021-08-21 PROCEDURE — 74177 CT ABD & PELVIS W/CONTRAST: CPT | Performed by: INTERNAL MEDICINE

## 2021-08-21 PROCEDURE — 71260 CT THORAX DX C+: CPT | Performed by: INTERNAL MEDICINE

## 2021-08-27 ENCOUNTER — OFFICE VISIT (OUTPATIENT)
Dept: HEMATOLOGY/ONCOLOGY | Facility: HOSPITAL | Age: 66
End: 2021-08-27
Attending: INTERNAL MEDICINE
Payer: COMMERCIAL

## 2021-08-27 VITALS
DIASTOLIC BLOOD PRESSURE: 62 MMHG | HEIGHT: 62.99 IN | WEIGHT: 111 LBS | RESPIRATION RATE: 16 BRPM | OXYGEN SATURATION: 100 % | HEART RATE: 86 BPM | TEMPERATURE: 98 F | SYSTOLIC BLOOD PRESSURE: 101 MMHG | BODY MASS INDEX: 19.67 KG/M2

## 2021-08-27 DIAGNOSIS — M12.9 ARTHRITIS, MULTIPLE JOINT INVOLVEMENT: ICD-10-CM

## 2021-08-27 DIAGNOSIS — R21 RASH: ICD-10-CM

## 2021-08-27 DIAGNOSIS — C78.1 NON-SMALL CELL LUNG CANCER METASTATIC TO MEDIASTINUM (HCC): Primary | ICD-10-CM

## 2021-08-27 DIAGNOSIS — C34.90 NON-SMALL CELL LUNG CANCER METASTATIC TO MEDIASTINUM (HCC): Primary | ICD-10-CM

## 2021-08-27 DIAGNOSIS — R50.9 FEVER, UNSPECIFIED FEVER CAUSE: ICD-10-CM

## 2021-08-27 LAB
ALBUMIN SERPL-MCNC: 2.3 G/DL (ref 3.4–5)
ALBUMIN/GLOB SERPL: 0.5 {RATIO} (ref 1–2)
ALP LIVER SERPL-CCNC: 66 U/L
ALT SERPL-CCNC: 10 U/L
ANION GAP SERPL CALC-SCNC: 5 MMOL/L (ref 0–18)
AST SERPL-CCNC: 11 U/L (ref 15–37)
BASOPHILS # BLD AUTO: 0.04 X10(3) UL (ref 0–0.2)
BASOPHILS NFR BLD AUTO: 0.4 %
BILIRUB SERPL-MCNC: 0.3 MG/DL (ref 0.1–2)
BUN BLD-MCNC: 19 MG/DL (ref 7–18)
CALCIUM BLD-MCNC: 8.6 MG/DL (ref 8.5–10.1)
CHLORIDE SERPL-SCNC: 106 MMOL/L (ref 98–112)
CO2 SERPL-SCNC: 28 MMOL/L (ref 21–32)
CREAT BLD-MCNC: 0.38 MG/DL
EOSINOPHIL # BLD AUTO: 0.44 X10(3) UL (ref 0–0.7)
EOSINOPHIL NFR BLD AUTO: 4.8 %
ERYTHROCYTE [DISTWIDTH] IN BLOOD BY AUTOMATED COUNT: 15.3 %
GLOBULIN PLAS-MCNC: 4.4 G/DL (ref 2.8–4.4)
GLUCOSE BLD-MCNC: 107 MG/DL (ref 70–99)
HCT VFR BLD AUTO: 28.4 %
HGB BLD-MCNC: 8.5 G/DL
IMM GRANULOCYTES # BLD AUTO: 0.02 X10(3) UL (ref 0–1)
IMM GRANULOCYTES NFR BLD: 0.2 %
LYMPHOCYTES # BLD AUTO: 0.94 X10(3) UL (ref 1–4)
LYMPHOCYTES NFR BLD AUTO: 10.2 %
M PROTEIN MFR SERPL ELPH: 6.7 G/DL (ref 6.4–8.2)
MCH RBC QN AUTO: 26.5 PG (ref 26–34)
MCHC RBC AUTO-ENTMCNC: 29.9 G/DL (ref 31–37)
MCV RBC AUTO: 88.5 FL
MONOCYTES # BLD AUTO: 0.77 X10(3) UL (ref 0.1–1)
MONOCYTES NFR BLD AUTO: 8.4 %
NEUTROPHILS # BLD AUTO: 6.98 X10 (3) UL (ref 1.5–7.7)
NEUTROPHILS # BLD AUTO: 6.98 X10(3) UL (ref 1.5–7.7)
NEUTROPHILS NFR BLD AUTO: 76 %
OSMOLALITY SERPL CALC.SUM OF ELEC: 291 MOSM/KG (ref 275–295)
PLATELET # BLD AUTO: 564 10(3)UL (ref 150–450)
POTASSIUM SERPL-SCNC: 3.9 MMOL/L (ref 3.5–5.1)
RBC # BLD AUTO: 3.21 X10(6)UL
SODIUM SERPL-SCNC: 139 MMOL/L (ref 136–145)
WBC # BLD AUTO: 9.2 X10(3) UL (ref 4–11)

## 2021-08-27 PROCEDURE — 99214 OFFICE O/P EST MOD 30 MIN: CPT | Performed by: INTERNAL MEDICINE

## 2021-08-27 RX ORDER — ZOLPIDEM TARTRATE 5 MG/1
5 TABLET ORAL NIGHTLY PRN
Qty: 10 TABLET | Refills: 0 | Status: SHIPPED | OUTPATIENT
Start: 2021-08-27 | End: 2021-09-01

## 2021-08-27 RX ORDER — PREDNISONE 50 MG/1
50 TABLET ORAL DAILY
Qty: 4 TABLET | Refills: 0 | Status: SHIPPED | OUTPATIENT
Start: 2021-08-27 | End: 2021-08-31

## 2021-08-27 NOTE — PROGRESS NOTES
Cancer Center Progress Note  Patient Name: Moni Chauhan   YOB: 1955   Medical Record Number: [id number]   CSN: [id number]   Attending Physician: Stacy Harris M.D.        Date of Visit: 8/27/2021       Chief Complaint:  Patient pr steadfastly refused to consider any further biopsy or workup. After discussing treatment options for presumed stage IV lung cancer, we settled on combined immunotherapy. History of Present Illness:  Pt is here for follow up.  Immunotherapy was held shyam Narrative      Not on file    Social Determinants of Health  Financial Resource Strain:       Difficulty of Paying Living Expenses:   Food Insecurity:       Worried About Running Out of Food in the Last Year:       Ran Out of Food in the Last Year:   Trans hemoptysis. Cardiovascular No anginal chest pain, palpitations or orthopnea. Gastrointestinal No nausea, vomiting, diarrhea, GI bleeding. NL appetite. Genitorurinary  No hematuria, dysuria, abnormal bleeding, or incontinence.    Integumentary yellowin 0.38 L   GFRAA 130   GFRNAA 113   CA 8.6   ALB 2.3 L      K 3.9      CO2 28.0   ALKPHO 66   AST 11 L   ALT 10 L   BILT 0.3   TP 6.7     Radiology:  CT C/A/P:  CONCLUSION:     1.  Adenopathy as described above with increase in size of lymph nodes discordant with the CT and PET findings of an enlarging PET positive mass. We discussed that repeat biopsy was recommended. She steadfastly refuses additional biopsies.   In the absence of the biopsy, definitively demonstrating benign etiology of the LLL, and combined immunotherapy management. Electronically Signed by: Inocente Horn M.D.   [facility]

## 2021-08-31 ENCOUNTER — CLINICAL ABSTRACT (OUTPATIENT)
Dept: HEALTH INFORMATION MANAGEMENT | Age: 66
End: 2021-08-31

## 2021-08-31 ENCOUNTER — TELEPHONE (OUTPATIENT)
Dept: HEMATOLOGY/ONCOLOGY | Facility: HOSPITAL | Age: 66
End: 2021-08-31

## 2021-09-01 ENCOUNTER — TELEPHONE (OUTPATIENT)
Dept: HEMATOLOGY/ONCOLOGY | Facility: HOSPITAL | Age: 66
End: 2021-09-01

## 2021-09-01 RX ORDER — PREDNISONE 10 MG/1
50 TABLET ORAL DAILY
Qty: 75 TABLET | Refills: 0 | Status: SHIPPED | OUTPATIENT
Start: 2021-09-01 | End: 2021-10-25

## 2021-09-01 RX ORDER — ZOLPIDEM TARTRATE 5 MG/1
5 TABLET ORAL NIGHTLY PRN
Qty: 30 TABLET | Refills: 0 | Status: SHIPPED | OUTPATIENT
Start: 2021-09-01 | End: 2021-09-27 | Stop reason: ALTCHOICE

## 2021-09-01 NOTE — TELEPHONE ENCOUNTER
Pollo Crawford called yesterday and again today. She wanted to let Dr Olayinka Rosales know that she finished the steroids Dr Olayinka Rosales prescribed. She is requesting a call back today at [phone].

## 2021-09-27 ENCOUNTER — OFFICE VISIT (OUTPATIENT)
Dept: HEMATOLOGY/ONCOLOGY | Facility: HOSPITAL | Age: 66
End: 2021-09-27
Attending: INTERNAL MEDICINE
Payer: COMMERCIAL

## 2021-09-27 VITALS
DIASTOLIC BLOOD PRESSURE: 53 MMHG | SYSTOLIC BLOOD PRESSURE: 104 MMHG | OXYGEN SATURATION: 83 % | TEMPERATURE: 98 F | BODY MASS INDEX: 18.78 KG/M2 | RESPIRATION RATE: 16 BRPM | HEIGHT: 62.99 IN | WEIGHT: 106 LBS | HEART RATE: 83 BPM

## 2021-09-27 DIAGNOSIS — L27.0 RASH, DRUG: ICD-10-CM

## 2021-09-27 DIAGNOSIS — C34.90 NON-SMALL CELL LUNG CANCER METASTATIC TO MEDIASTINUM (HCC): Primary | ICD-10-CM

## 2021-09-27 DIAGNOSIS — M12.9 ARTHRITIS, MULTIPLE JOINT INVOLVEMENT: ICD-10-CM

## 2021-09-27 DIAGNOSIS — C78.1 NON-SMALL CELL LUNG CANCER METASTATIC TO MEDIASTINUM (HCC): Primary | ICD-10-CM

## 2021-09-27 LAB
ALBUMIN SERPL-MCNC: 3 G/DL (ref 3.4–5)
ALBUMIN/GLOB SERPL: 0.8 {RATIO} (ref 1–2)
ALP LIVER SERPL-CCNC: 57 U/L
ALT SERPL-CCNC: 18 U/L
ANION GAP SERPL CALC-SCNC: 0 MMOL/L (ref 0–18)
AST SERPL-CCNC: 11 U/L (ref 15–37)
BASOPHILS # BLD AUTO: 0.06 X10(3) UL (ref 0–0.2)
BASOPHILS NFR BLD AUTO: 0.6 %
BILIRUB SERPL-MCNC: 0.3 MG/DL (ref 0.1–2)
BUN BLD-MCNC: 13 MG/DL (ref 7–18)
CALCIUM BLD-MCNC: 9.1 MG/DL (ref 8.5–10.1)
CHLORIDE SERPL-SCNC: 105 MMOL/L (ref 98–112)
CO2 SERPL-SCNC: 32 MMOL/L (ref 21–32)
CREAT BLD-MCNC: 0.5 MG/DL
EOSINOPHIL # BLD AUTO: 0.35 X10(3) UL (ref 0–0.7)
EOSINOPHIL NFR BLD AUTO: 3.3 %
ERYTHROCYTE [DISTWIDTH] IN BLOOD BY AUTOMATED COUNT: 19.5 %
GLOBULIN PLAS-MCNC: 3.6 G/DL (ref 2.8–4.4)
GLUCOSE BLD-MCNC: 79 MG/DL (ref 70–99)
HCT VFR BLD AUTO: 40 %
HGB BLD-MCNC: 11.6 G/DL
IMM GRANULOCYTES # BLD AUTO: 0.03 X10(3) UL (ref 0–1)
IMM GRANULOCYTES NFR BLD: 0.3 %
LYMPHOCYTES # BLD AUTO: 1.73 X10(3) UL (ref 1–4)
LYMPHOCYTES NFR BLD AUTO: 16.4 %
MCH RBC QN AUTO: 25.9 PG (ref 26–34)
MCHC RBC AUTO-ENTMCNC: 29 G/DL (ref 31–37)
MCV RBC AUTO: 89.3 FL
MONOCYTES # BLD AUTO: 0.85 X10(3) UL (ref 0.1–1)
MONOCYTES NFR BLD AUTO: 8.1 %
NEUTROPHILS # BLD AUTO: 7.53 X10 (3) UL (ref 1.5–7.7)
NEUTROPHILS # BLD AUTO: 7.53 X10(3) UL (ref 1.5–7.7)
NEUTROPHILS NFR BLD AUTO: 71.3 %
OSMOLALITY SERPL CALC.SUM OF ELEC: 283 MOSM/KG (ref 275–295)
PLATELET # BLD AUTO: 329 10(3)UL (ref 150–450)
POTASSIUM SERPL-SCNC: 5 MMOL/L (ref 3.5–5.1)
PROT SERPL-MCNC: 6.6 G/DL (ref 6.4–8.2)
RBC # BLD AUTO: 4.48 X10(6)UL
SODIUM SERPL-SCNC: 137 MMOL/L (ref 136–145)
WBC # BLD AUTO: 10.6 X10(3) UL (ref 4–11)

## 2021-09-27 PROCEDURE — 99215 OFFICE O/P EST HI 40 MIN: CPT | Performed by: INTERNAL MEDICINE

## 2021-09-27 RX ORDER — ZOLPIDEM TARTRATE 10 MG/1
10 TABLET ORAL NIGHTLY PRN
Qty: 30 TABLET | Refills: 0 | Status: SHIPPED | OUTPATIENT
Start: 2021-09-27 | End: 2021-11-15

## 2021-09-27 RX ORDER — PREDNISONE 1 MG/1
TABLET ORAL
Qty: 30 TABLET | Refills: 0 | Status: SHIPPED | OUTPATIENT
Start: 2021-09-27 | End: 2021-10-07

## 2021-09-27 NOTE — PROGRESS NOTES
Cancer Center Progress Note  Patient Name: Yeyo Norman   YOB: 1955   Medical Record Number: [id number]   CSN: [id number]   Attending Physician: Alexandria Smith M.D.        Date of Visit: 9/27/2021     Chief Complaint:  Patient pres steadfastly refused to consider any further biopsy or workup. After discussing treatment options for presumed stage IV lung cancer, we settled on combined immunotherapy.   She developed severe arthritis, chronic fever, and rash that prompted holding the im Topics      Concerns:        Not on file    Social History Narrative      Not on file    Social Determinants of Health  Financial Resource Strain:       Difficulty of Paying Living Expenses: Not on file  Food Insecurity:       Worried About Running Out of HYDROCODONE-ACETAMINOPHEN 5-325 MG Oral Tab, TAKE 1-2 TABLETS BY MOUTH EVERY 4 HOURS AS NEEDED FOR PAIN, Disp: 30 tablet, Rfl: 0  •  Multiple Vitamins-Minerals (MULTI-VITAMIN/MINERALS) Oral Tab, Take 1 tablet by mouth daily. , Disp: , Rfl:     Allergies:  N Integumentary Erythematous rash of the abdomen w/o blisters or excoriation. Improved. Neurologic Normal - No sensory or motor deficits, normal cerebellar function, normal gait, cranial nerves intact. Psychiatric Normal - A&Ox3. Coherent speech.  Verb abnormalities.   -No evidence of malignancy. Impression and Plan:  Non-small cell lung cancer: The patient's CT demonstrates progression of the RUL and LLL lesions, concerning for both being malignant.   Her case was reviewed at the [facility]iscipl[facility] Norco. Continue Protonix if Ibuprofen is to be used. Trial of Prednisone improved things, as above. I suspect that the immunotherapy uncovered a previously undiagnosed autoimmune disorder.  Will ask rheumatology to basilia. If she does not have a diagnosable a

## 2021-10-07 ENCOUNTER — TELEPHONE (OUTPATIENT)
Dept: HEMATOLOGY/ONCOLOGY | Facility: HOSPITAL | Age: 66
End: 2021-10-07

## 2021-10-07 NOTE — TELEPHONE ENCOUNTER
Jin Melgar called had been placed on prednisone for rash, itching, fevers and joint pain. Her last instructed day to take prednisone will be Monday but will still have 9 pills left as MD stated he was going to give her some extra.   She is still very itchy, ra

## 2021-10-07 NOTE — TELEPHONE ENCOUNTER
Esme Hopper with instructions to increase prednisone to 15 mg daily and call with condition update next week and weaning will be based on symptoms. She verbalizes understanding.

## 2021-10-13 ENCOUNTER — TELEPHONE (OUTPATIENT)
Dept: HEMATOLOGY/ONCOLOGY | Facility: HOSPITAL | Age: 66
End: 2021-10-13

## 2021-10-13 NOTE — TELEPHONE ENCOUNTER
Darren Friedman called with condition update. Had the joint, shoulder, arm, finger, knee discomfort and fevers, she was to take Prednisone 15 mg daily and call with update. Joint discomfort slightly better and fevers gone.   What is plan for weaning of Prednisone w

## 2021-10-15 ENCOUNTER — TELEPHONE (OUTPATIENT)
Dept: HEMATOLOGY/ONCOLOGY | Facility: HOSPITAL | Age: 66
End: 2021-10-15

## 2021-10-15 NOTE — TELEPHONE ENCOUNTER
Mahogany Bourgeois called she did not hear back from 10/13/21 update regarding tapering or not of prednisone dose. Please call with instructions.

## 2021-10-15 NOTE — TELEPHONE ENCOUNTER
Merline Mitchell, MD Ileen Pat, RN 38 minutes ago (2:57 PM)         Cut the prednisone to 10mg for the weekend. Call us on Monday with an update. Pt informed.

## 2021-10-22 ENCOUNTER — HOSPITAL ENCOUNTER (OUTPATIENT)
Dept: CT IMAGING | Age: 66
Discharge: HOME | End: 2021-10-22
Attending: INTERNAL MEDICINE
Payer: COMMERCIAL

## 2021-10-22 DIAGNOSIS — C34.90 NON-SMALL CELL LUNG CANCER METASTATIC TO MEDIASTINUM (HCC): ICD-10-CM

## 2021-10-22 DIAGNOSIS — C78.1 NON-SMALL CELL LUNG CANCER METASTATIC TO MEDIASTINUM (HCC): ICD-10-CM

## 2021-10-22 PROCEDURE — 71260 CT THORAX DX C+: CPT | Performed by: INTERNAL MEDICINE

## 2021-10-22 PROCEDURE — 74177 CT ABD & PELVIS W/CONTRAST: CPT | Performed by: INTERNAL MEDICINE

## 2021-10-25 ENCOUNTER — EXTERNAL RECORD (OUTPATIENT)
Dept: HEALTH INFORMATION MANAGEMENT | Facility: OTHER | Age: 66
End: 2021-10-25

## 2021-10-25 ENCOUNTER — HOSPITAL ENCOUNTER (EMERGENCY)
Facility: HOSPITAL | Age: 66
Discharge: HOME | End: 2021-10-25
Attending: EMERGENCY MEDICINE
Payer: COMMERCIAL

## 2021-10-25 ENCOUNTER — OFFICE VISIT (OUTPATIENT)
Dept: HEMATOLOGY/ONCOLOGY | Facility: HOSPITAL | Age: 66
End: 2021-10-25
Attending: INTERNAL MEDICINE
Payer: COMMERCIAL

## 2021-10-25 ENCOUNTER — APPOINTMENT (OUTPATIENT)
Dept: ULTRASOUND IMAGING | Facility: HOSPITAL | Age: 66
End: 2021-10-25
Attending: EMERGENCY MEDICINE
Payer: COMMERCIAL

## 2021-10-25 VITALS
DIASTOLIC BLOOD PRESSURE: 57 MMHG | HEART RATE: 75 BPM | RESPIRATION RATE: 14 BRPM | HEIGHT: 64 IN | TEMPERATURE: 98 F | WEIGHT: 103 LBS | OXYGEN SATURATION: 95 % | SYSTOLIC BLOOD PRESSURE: 112 MMHG | BODY MASS INDEX: 17.58 KG/M2

## 2021-10-25 VITALS
DIASTOLIC BLOOD PRESSURE: 73 MMHG | HEART RATE: 89 BPM | SYSTOLIC BLOOD PRESSURE: 113 MMHG | RESPIRATION RATE: 16 BRPM | TEMPERATURE: 99 F | OXYGEN SATURATION: 100 %

## 2021-10-25 DIAGNOSIS — C34.90 NON-SMALL CELL LUNG CANCER METASTATIC TO MEDIASTINUM (HCC): ICD-10-CM

## 2021-10-25 DIAGNOSIS — R20.0 NUMBNESS OF TOES: Primary | ICD-10-CM

## 2021-10-25 DIAGNOSIS — C78.1 NON-SMALL CELL LUNG CANCER METASTATIC TO MEDIASTINUM (HCC): ICD-10-CM

## 2021-10-25 DIAGNOSIS — M12.9 ARTHRITIS, MULTIPLE JOINT INVOLVEMENT: Primary | ICD-10-CM

## 2021-10-25 PROCEDURE — 99285 EMERGENCY DEPT VISIT HI MDM: CPT

## 2021-10-25 PROCEDURE — 93925 LOWER EXTREMITY STUDY: CPT | Performed by: EMERGENCY MEDICINE

## 2021-10-25 PROCEDURE — 99284 EMERGENCY DEPT VISIT MOD MDM: CPT

## 2021-10-25 PROCEDURE — 85025 COMPLETE CBC W/AUTO DIFF WBC: CPT | Performed by: EMERGENCY MEDICINE

## 2021-10-25 PROCEDURE — 80053 COMPREHEN METABOLIC PANEL: CPT | Performed by: EMERGENCY MEDICINE

## 2021-10-25 PROCEDURE — 85610 PROTHROMBIN TIME: CPT | Performed by: EMERGENCY MEDICINE

## 2021-10-25 PROCEDURE — 36415 COLL VENOUS BLD VENIPUNCTURE: CPT

## 2021-10-25 PROCEDURE — 85730 THROMBOPLASTIN TIME PARTIAL: CPT | Performed by: EMERGENCY MEDICINE

## 2021-10-25 PROCEDURE — 99214 OFFICE O/P EST MOD 30 MIN: CPT | Performed by: INTERNAL MEDICINE

## 2021-10-25 RX ORDER — PREDNISONE 50 MG/1
50 TABLET ORAL DAILY
Qty: 14 TABLET | Refills: 1 | Status: SHIPPED | OUTPATIENT
Start: 2021-10-25 | End: 2021-11-15 | Stop reason: DRUGHIGH

## 2021-10-25 NOTE — PROGRESS NOTES
Cancer Center Progress Note  Patient Name: Augustin Richardson   YOB: 1955   Medical Record Number: [id number]   CSN: [id number]   Attending Physician: Hawk Dumont M.D.        Date of Visit: 10/25/2021       Chief Complaint:  Patient p steadfastly refused to consider any further biopsy or workup. After discussing treatment options for presumed stage IV lung cancer, we settled on combined immunotherapy.   She developed severe arthritis, chronic fever, and rash that prompted holding the im Concerns:        Not on file    Social History Narrative      Not on file    Social Determinants of Health  Financial Resource Strain:       Difficulty of Paying Living Expenses: Not on file  Food Insecurity:       Worried About [address] in the NON FORMULARY, CBD gummies (Patient not taking: Reported on 9/27/2021), Disp: , Rfl:   •  ibuprofen 200 MG Oral Tab, Take 600 mg by mouth 2 (two) times a day., Disp: , Rfl:   •  HYDROCODONE-ACETAMINOPHEN 5-325 MG Oral Tab, TAKE 1-2 TABLETS BY MOUTH EVERY 4 Abdomen Normal - Non-tender, non-distended, no masses, ascites or hepatosplenomegaly. Extremities Cyanosis of the great toenails bilaterally, diminished DP pulses bilaterally. A transition point warm to cool noted at the midfoot bilaterally.    Integum significant pathologic abnormalities.   -No evidence of malignancy. Impression and Plan:    Cyanosis of the toes: Bilateral. With numbness and diminished pulses, I am concerned about the arterial circulation to the feet.  Will transport to the ER f eventually agreed to 50mg daily x5 days, and when she improved, a prolonged wean. She is better, but symptoms are returning with weaning. She has a rheumatology appointment on Tuesday. Check TOMEKA, RF today.  If she is admitted, will ask for rheumatology basilia

## 2021-10-25 NOTE — ED PROVIDER NOTES
Patient Seen in: [facility] Emergency Department      History   Patient presents with:  Numbness Weakness    Stated Complaint:     Subjective:   HPI    61-year-old female with past medical history of lung cancer presents here for evaluation of bilat 17.68 kg/m²         Physical Exam  Constitutional:       Appearance: Normal appearance. HENT:      Head: Normocephalic.       Nose: Nose normal.      Mouth/Throat:      Mouth: Mucous membranes are moist.   Eyes:      Extraocular Movements: Extraocular mov CHEST+ABDOMEN+PELVIS(ALL CNTRST ONLY)(CPT=71260/13585)    Result Date: 10/22/2021  PROCEDURE:  CT CHEST+ABDOMEN+PELVIS(ALL CNTRST ONLY)(CPT=71260/71874)  COMPARISON:  JUAN TINAJERO, CT CHEST+ABDOMEN+PELVIS(ALL CNTRST ONLY)(CPT=71260/44360), 8/21/2021, 10:28 Fullness of the right renal collecting system is noted. No suspicious renal lesions. ADRENALS:  No mass or enlargement. AORTA:  No aneurysm or dissection. RETROPERITONEUM:  No mass or adenopathy.   BOWEL/MESENTERY:  No visible mass, obstruction, or daysi Triphasic waveforms to the level of the popliteal artery proximally. Distal to this level to the ankle the waveforms are biphasic. OTHER:  None.   SYSTOLIC VELOCITIES (CM/S): RIGHT COMMON FEMORAL:      113  RIGHT PROFUNDA FEMORAL:      66    RIGHT SUPERFIC may relate to either a nondiagnosed rheumatologic condition versus medication side effect from the chemotherapy. He will prescribe patient a course of steroids and recommended discharge home.   Patient updated on results and feels comfortable with discharg

## 2021-10-25 NOTE — ED INITIAL ASSESSMENT (HPI)
Pt presents to ED with complaint of numbness to feet/toes. Reports seeing oncologist this morning who sent her to ED to rule out occlusion. Pt reports toes are blue.

## 2021-11-02 ENCOUNTER — OFFICE VISIT (OUTPATIENT)
Dept: RHEUMATOLOGY | Facility: CLINIC | Age: 66
End: 2021-11-02
Payer: COMMERCIAL

## 2021-11-02 ENCOUNTER — LAB ENCOUNTER (OUTPATIENT)
Dept: LAB | Age: 66
End: 2021-11-02
Attending: INTERNAL MEDICINE
Payer: COMMERCIAL

## 2021-11-02 ENCOUNTER — HOSPITAL ENCOUNTER (OUTPATIENT)
Dept: GENERAL RADIOLOGY | Age: 66
Discharge: HOME | End: 2021-11-02
Attending: INTERNAL MEDICINE
Payer: COMMERCIAL

## 2021-11-02 VITALS
DIASTOLIC BLOOD PRESSURE: 62 MMHG | WEIGHT: 107 LBS | SYSTOLIC BLOOD PRESSURE: 108 MMHG | HEIGHT: 64 IN | OXYGEN SATURATION: 99 % | HEART RATE: 98 BPM | BODY MASS INDEX: 18.27 KG/M2 | RESPIRATION RATE: 16 BRPM

## 2021-11-02 DIAGNOSIS — R76.8 POSITIVE ANA (ANTINUCLEAR ANTIBODY): Primary | ICD-10-CM

## 2021-11-02 DIAGNOSIS — G89.29 CHRONIC PAIN OF RIGHT ANKLE: ICD-10-CM

## 2021-11-02 DIAGNOSIS — C34.90 NON-SMALL CELL LUNG CANCER METASTATIC TO MEDIASTINUM (HCC): ICD-10-CM

## 2021-11-02 DIAGNOSIS — M75.41 CORACOID IMPINGEMENT OF RIGHT SHOULDER: ICD-10-CM

## 2021-11-02 DIAGNOSIS — Z11.59 NEED FOR HEPATITIS C SCREENING TEST: ICD-10-CM

## 2021-11-02 DIAGNOSIS — Z11.1 SCREENING FOR TUBERCULOSIS: ICD-10-CM

## 2021-11-02 DIAGNOSIS — Z51.81 THERAPEUTIC DRUG MONITORING: ICD-10-CM

## 2021-11-02 DIAGNOSIS — M25.571 CHRONIC PAIN OF RIGHT ANKLE: ICD-10-CM

## 2021-11-02 DIAGNOSIS — M47.816 OSTEOARTHRITIS OF LUMBAR SPINE, UNSPECIFIED SPINAL OSTEOARTHRITIS COMPLICATION STATUS: ICD-10-CM

## 2021-11-02 DIAGNOSIS — M19.011 LOCALIZED OSTEOARTHRITIS OF SHOULDER REGIONS, BILATERAL: ICD-10-CM

## 2021-11-02 DIAGNOSIS — Z11.59 NEED FOR HEPATITIS B SCREENING TEST: ICD-10-CM

## 2021-11-02 DIAGNOSIS — C78.1 NON-SMALL CELL LUNG CANCER METASTATIC TO MEDIASTINUM (HCC): ICD-10-CM

## 2021-11-02 DIAGNOSIS — M19.012 LOCALIZED OSTEOARTHRITIS OF SHOULDER REGIONS, BILATERAL: ICD-10-CM

## 2021-11-02 PROCEDURE — 3008F BODY MASS INDEX DOCD: CPT | Performed by: INTERNAL MEDICINE

## 2021-11-02 PROCEDURE — 3078F DIAST BP <80 MM HG: CPT | Performed by: INTERNAL MEDICINE

## 2021-11-02 PROCEDURE — 99245 OFF/OP CONSLTJ NEW/EST HI 55: CPT | Performed by: INTERNAL MEDICINE

## 2021-11-02 PROCEDURE — 3074F SYST BP LT 130 MM HG: CPT | Performed by: INTERNAL MEDICINE

## 2021-11-02 NOTE — PATIENT INSTRUCTIONS
For bone health, especially when on chronic corticosteroids or if you have low bone density (and/or osteoporosis):   1. Goal daily intake is 1000 to 1200 mg of calcium. Do not exceed this level.   2. Goal daily intake is least 800 international un

## 2021-11-02 NOTE — PROGRESS NOTES
Rheumatology New Patient Note  =====================================================================================================      Date of visit: 11/2/2021  ? Patient presents with:  Establish Care: new pt. Dr. Olayinka Rosales (onc).  Was doing chemo was acting up. Prednisone increased from 10 to 50 mg daily since last week. RPIP4 was dislocated many years ago. There is chronic   R ankle: had a ORIF 40 years ago. She has seen a chiropractor and x-rays were done.   Reportedly it showed lumbar DJD Mother      Social History:  Social History    Tobacco Use      Smoking status: Former Smoker        Packs/day: 1.50        Years: 40.00        Pack years: 61        Start date: 1968        Quit date: 2010        Years since quittin.0      S shoulder: Positive Neer's and painful arc up to 90 degrees.   Mild AC joint tenderness (mild)    right PIP 4 with moderate osteochondral hypertrophy and mild swelling  Tender to palpation in R >L anterior ankles without significant effusion    Labs:  Lab Re 123  RF negative  Sed rate 23  CRP 4.14    6/2021 R shoulder  Impression   CONCLUSION:     There is moderate degenerative change involving the right glenohumeral joint space with marginal inferior medial right femoral head osteophyte with some old posttrau =====================================================================================================  Assessment and Plan    Assessment:  Positive TOMEKA (antinuclear antibody)  (primary encounter diagnosis)  Need for hepatitis B screening test  Need for notice any active synovitis today while she is on prednisone 50 mg. Patient with a positive TOMEKA and low titer of SM and low titer RNP. No definitive signs or symptoms of lupus noted today. Plan:  - To work up positive TOMEKA (Antinuclear Antibody):  Ob VASCULITIS W/REFLEX; Future  -     CRYOGLOBULIN; Future  -     CRYOFIBRINOGEN; Future  -     MISCELLANEOUS TESTING; Future    Need for hepatitis B screening test  -     HEPATITIS B SURFACE ANTIGEN; Future  -     HEP B CORE AB, TOT;  Future  -     HEPATITIS

## 2021-11-03 ENCOUNTER — TELEPHONE (OUTPATIENT)
Dept: RHEUMATOLOGY | Facility: CLINIC | Age: 66
End: 2021-11-03

## 2021-11-03 ENCOUNTER — LAB ENCOUNTER (OUTPATIENT)
Dept: LAB | Facility: HOSPITAL | Age: 66
End: 2021-11-03
Attending: INTERNAL MEDICINE
Payer: COMMERCIAL

## 2021-11-03 ENCOUNTER — HOSPITAL ENCOUNTER (OUTPATIENT)
Dept: GENERAL RADIOLOGY | Facility: HOSPITAL | Age: 66
Discharge: HOME | End: 2021-11-03
Attending: INTERNAL MEDICINE
Payer: COMMERCIAL

## 2021-11-03 DIAGNOSIS — Z11.1 SCREENING FOR TUBERCULOSIS: Primary | ICD-10-CM

## 2021-11-03 DIAGNOSIS — Z11.59 NEED FOR HEPATITIS B SCREENING TEST: ICD-10-CM

## 2021-11-03 DIAGNOSIS — Z11.59 NEED FOR HEPATITIS C SCREENING TEST: ICD-10-CM

## 2021-11-03 DIAGNOSIS — R76.8 POSITIVE ANA (ANTINUCLEAR ANTIBODY): ICD-10-CM

## 2021-11-03 PROCEDURE — 85652 RBC SED RATE AUTOMATED: CPT

## 2021-11-03 PROCEDURE — 85610 PROTHROMBIN TIME: CPT

## 2021-11-03 PROCEDURE — 86146 BETA-2 GLYCOPROTEIN ANTIBODY: CPT

## 2021-11-03 PROCEDURE — 82595 ASSAY OF CRYOGLOBULIN: CPT

## 2021-11-03 PROCEDURE — 86480 TB TEST CELL IMMUN MEASURE: CPT

## 2021-11-03 PROCEDURE — 86038 ANTINUCLEAR ANTIBODIES: CPT

## 2021-11-03 PROCEDURE — 82585 ASSAY OF CRYOFIBRINOGEN: CPT

## 2021-11-03 PROCEDURE — 85705 THROMBOPLASTIN INHIBITION: CPT

## 2021-11-03 PROCEDURE — 86255 FLUORESCENT ANTIBODY SCREEN: CPT

## 2021-11-03 PROCEDURE — 36415 COLL VENOUS BLD VENIPUNCTURE: CPT

## 2021-11-03 PROCEDURE — 83516 IMMUNOASSAY NONANTIBODY: CPT

## 2021-11-03 PROCEDURE — 82570 ASSAY OF URINE CREATININE: CPT

## 2021-11-03 PROCEDURE — 86147 CARDIOLIPIN ANTIBODY EA IG: CPT

## 2021-11-03 PROCEDURE — 86200 CCP ANTIBODY: CPT

## 2021-11-03 PROCEDURE — 85613 RUSSELL VIPER VENOM DILUTED: CPT

## 2021-11-03 PROCEDURE — 86160 COMPLEMENT ANTIGEN: CPT

## 2021-11-03 PROCEDURE — 84156 ASSAY OF PROTEIN URINE: CPT

## 2021-11-03 PROCEDURE — 86706 HEP B SURFACE ANTIBODY: CPT

## 2021-11-03 PROCEDURE — 86235 NUCLEAR ANTIGEN ANTIBODY: CPT

## 2021-11-03 PROCEDURE — 87340 HEPATITIS B SURFACE AG IA: CPT

## 2021-11-03 PROCEDURE — 86704 HEP B CORE ANTIBODY TOTAL: CPT

## 2021-11-03 PROCEDURE — 81003 URINALYSIS AUTO W/O SCOPE: CPT

## 2021-11-03 PROCEDURE — 86803 HEPATITIS C AB TEST: CPT

## 2021-11-03 PROCEDURE — 85732 THROMBOPLASTIN TIME PARTIAL: CPT

## 2021-11-03 PROCEDURE — 86140 C-REACTIVE PROTEIN: CPT

## 2021-11-03 PROCEDURE — 83876 ASSAY MYELOPEROXIDASE: CPT

## 2021-11-03 PROCEDURE — 73610 X-RAY EXAM OF ANKLE: CPT | Performed by: INTERNAL MEDICINE

## 2021-11-03 RX ORDER — PREDNISONE 1 MG/1
TABLET ORAL
Qty: 275 TABLET | Refills: 0 | Status: SHIPPED | OUTPATIENT
Start: 2021-11-03 | End: 2022-01-31 | Stop reason: DRUGHIGH

## 2021-11-03 NOTE — TELEPHONE ENCOUNTER
Called pt, dicussed with Dr. Corrine Molina, ok to decrease to 20 mg, do this for several days then decrease to 15 mg daily. r ankle Xr with mild djd. No fracture.     Please schedule RTC on 11/19 or around that date

## 2021-11-06 NOTE — PROGRESS NOTES
Negative TOMEKA on confirmation testing. This may mean the first positive TOMEKA test was a false positive (a fluke essentially).

## 2021-11-15 ENCOUNTER — OFFICE VISIT (OUTPATIENT)
Dept: HEMATOLOGY/ONCOLOGY | Facility: HOSPITAL | Age: 66
End: 2021-11-15
Attending: INTERNAL MEDICINE
Payer: COMMERCIAL

## 2021-11-15 VITALS
RESPIRATION RATE: 18 BRPM | HEART RATE: 78 BPM | TEMPERATURE: 99 F | SYSTOLIC BLOOD PRESSURE: 104 MMHG | HEIGHT: 62.99 IN | OXYGEN SATURATION: 97 % | BODY MASS INDEX: 18.78 KG/M2 | WEIGHT: 106 LBS | DIASTOLIC BLOOD PRESSURE: 68 MMHG

## 2021-11-15 DIAGNOSIS — L27.0 RASH, DRUG: ICD-10-CM

## 2021-11-15 DIAGNOSIS — C78.1 NON-SMALL CELL LUNG CANCER METASTATIC TO MEDIASTINUM (HCC): Primary | ICD-10-CM

## 2021-11-15 DIAGNOSIS — C34.90 NON-SMALL CELL LUNG CANCER METASTATIC TO MEDIASTINUM (HCC): Primary | ICD-10-CM

## 2021-11-15 DIAGNOSIS — M19.019 ARTHRITIS, SHOULDER REGION: ICD-10-CM

## 2021-11-15 PROCEDURE — 99214 OFFICE O/P EST MOD 30 MIN: CPT | Performed by: INTERNAL MEDICINE

## 2021-11-15 RX ORDER — SULFAMETHOXAZOLE AND TRIMETHOPRIM 800; 160 MG/1; MG/1
TABLET ORAL
Qty: 30 TABLET | Refills: 11 | Status: SHIPPED | OUTPATIENT
Start: 2021-11-15 | End: 2022-01-31 | Stop reason: ALTCHOICE

## 2021-11-15 NOTE — PROGRESS NOTES
Cancer Center Progress Note  Patient Name: Monica Reyes   YOB: 1955   Medical Record Number: [id number]   CSN: [id number]   Attending Physician: Inocente Horn M.D.        Date of Visit: 11/15/2021     Chief Complaint:  Patient pre steadfastly refused to consider any further biopsy or workup. After discussing treatment options for presumed stage IV lung cancer, we settled on combined immunotherapy.   She developed severe arthritis, chronic fever, and rash that prompted holding the im Health  Financial Resource Strain: Not on file  Food Insecurity: Not on file  Transportation Needs: Not on file  Physical Activity: Not on file  Stress: Not on file  Social Connections: Not on file  Intimate Partner Violence: Not on file  Housing Stability chronological age. Well nourished. Well developed. Eyes Normal - Conjunctivae and sclerae are clear and without icterus. Pupils are reactive and equal.   Hematologic/Lymphatic Normal - No petechiae or purpura.   No tender or palpable lymph nodes in the ce mass:  -Adequate for evaluation.  -No cytologic evidence of malignancy.  -Specimen composed predominantly of benign bronchial epithelial cells.     C.  Cytospin smears and cell block from bronchoalveolar lavage, left lower lobe of lung:  -Adequate for evalu of chemo side effects to the risk of immunotherapy and has elected to proceed with Combined immunotherapy. We reviewed the risks, benefits, and side effects, and she agreed to proceed.  She complained of fatigue and fever that were intolerable, prompting [facility]

## 2021-11-16 ENCOUNTER — SOCIAL WORK SERVICES (OUTPATIENT)
Dept: HEMATOLOGY/ONCOLOGY | Facility: HOSPITAL | Age: 66
End: 2021-11-16

## 2021-11-16 NOTE — PROGRESS NOTES
SW completed and faxed updated medical records and attending physician statement to [facility].

## 2021-11-19 ENCOUNTER — OFFICE VISIT (OUTPATIENT)
Dept: RHEUMATOLOGY | Facility: CLINIC | Age: 66
End: 2021-11-19
Payer: COMMERCIAL

## 2021-11-19 VITALS
HEIGHT: 64 IN | BODY MASS INDEX: 18.44 KG/M2 | WEIGHT: 108 LBS | DIASTOLIC BLOOD PRESSURE: 70 MMHG | OXYGEN SATURATION: 99 % | SYSTOLIC BLOOD PRESSURE: 100 MMHG | HEART RATE: 97 BPM

## 2021-11-19 DIAGNOSIS — M25.511 BILATERAL SHOULDER PAIN, UNSPECIFIED CHRONICITY: ICD-10-CM

## 2021-11-19 DIAGNOSIS — M47.816 OSTEOARTHRITIS OF LUMBAR SPINE, UNSPECIFIED SPINAL OSTEOARTHRITIS COMPLICATION STATUS: ICD-10-CM

## 2021-11-19 DIAGNOSIS — R76.8 HEPATITIS B CORE ANTIBODY POSITIVE: ICD-10-CM

## 2021-11-19 DIAGNOSIS — G89.29 CHRONIC NECK PAIN: Primary | ICD-10-CM

## 2021-11-19 DIAGNOSIS — Z11.59 NEED FOR HEPATITIS B SCREENING TEST: ICD-10-CM

## 2021-11-19 DIAGNOSIS — S99.911S INJURY OF RIGHT ANKLE, SEQUELA: ICD-10-CM

## 2021-11-19 DIAGNOSIS — M47.812 OSTEOARTHRITIS OF CERVICAL SPINE, UNSPECIFIED SPINAL OSTEOARTHRITIS COMPLICATION STATUS: ICD-10-CM

## 2021-11-19 DIAGNOSIS — M54.2 CHRONIC NECK PAIN: Primary | ICD-10-CM

## 2021-11-19 DIAGNOSIS — Z13.820 SCREENING FOR OSTEOPOROSIS: ICD-10-CM

## 2021-11-19 DIAGNOSIS — Z79.52 LONG TERM CURRENT USE OF SYSTEMIC STEROIDS: ICD-10-CM

## 2021-11-19 DIAGNOSIS — Z51.81 THERAPEUTIC DRUG MONITORING: ICD-10-CM

## 2021-11-19 DIAGNOSIS — M25.512 BILATERAL SHOULDER PAIN, UNSPECIFIED CHRONICITY: ICD-10-CM

## 2021-11-19 DIAGNOSIS — M19.011 OSTEOARTHRITIS OF RIGHT SHOULDER, UNSPECIFIED OSTEOARTHRITIS TYPE: ICD-10-CM

## 2021-11-19 PROCEDURE — 3008F BODY MASS INDEX DOCD: CPT | Performed by: INTERNAL MEDICINE

## 2021-11-19 PROCEDURE — 99215 OFFICE O/P EST HI 40 MIN: CPT | Performed by: INTERNAL MEDICINE

## 2021-11-19 PROCEDURE — 3074F SYST BP LT 130 MM HG: CPT | Performed by: INTERNAL MEDICINE

## 2021-11-19 PROCEDURE — 3078F DIAST BP <80 MM HG: CPT | Performed by: INTERNAL MEDICINE

## 2021-11-19 NOTE — PATIENT INSTRUCTIONS
When you start leflunomide, decrease prednisone to 15 mg daily x 2 weeks, then 10 mg daily x 4 weeks, then 5 mg daily until next appointment. Get bloodwork 1 month after starting leflunomide. Schedule bone density and MRIs of C-spine and L-spine.
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
## 2021-11-22 ENCOUNTER — TELEPHONE (OUTPATIENT)
Dept: RHEUMATOLOGY | Facility: CLINIC | Age: 66
End: 2021-11-22

## 2021-11-22 ENCOUNTER — TELEPHONE (OUTPATIENT)
Dept: HEMATOLOGY/ONCOLOGY | Facility: HOSPITAL | Age: 66
End: 2021-11-22

## 2021-11-22 DIAGNOSIS — M06.4 INFLAMMATORY POLYARTHRITIS (HCC): Primary | ICD-10-CM

## 2021-11-22 RX ORDER — LEFLUNOMIDE 10 MG/1
TABLET ORAL
Qty: 75 TABLET | Refills: 0 | Status: SHIPPED | OUTPATIENT
Start: 2021-11-22 | End: 2022-01-02

## 2021-11-22 NOTE — TELEPHONE ENCOUNTER
PA for MRI lumbar, rt shoulder and cervical spine started with [facility]. Pending review.  Office notes required and faxed to [phone] attn nurse review

## 2021-11-22 NOTE — TELEPHONE ENCOUNTER
Ade Blancas reports that she saw her Rheumatologist last Friday. Dr Rojelio Marshall asked her to call him after that visit. Ade Blancas also wants to know when she is suppose to see Dr Rojelio Marshall again.

## 2021-11-22 NOTE — TELEPHONE ENCOUNTER
I called Alexis Roque and updated her that Dr Alesha Rasheed would like her to see him in 3 - 4 weeks. He also spoke with Dr Saundra Cox and agrees with his treatment plan. Alexis Roque verbalized understanding and will check her calendar to book her appointment.

## 2021-11-22 NOTE — TELEPHONE ENCOUNTER
Call patient got the okay from Dr. Carlotta Colon to start leflunomide. Repeat blood work in 1 month after starting the medication.

## 2021-11-23 NOTE — PROGRESS NOTES
PA for MRI lumbar, rt shoulder and cervical spine started with [facility]. Pending review.  Office notes required and faxed to [phone] attn nurse review

## 2021-11-29 ENCOUNTER — HOSPITAL ENCOUNTER (OUTPATIENT)
Dept: BONE DENSITY | Age: 66
Discharge: HOME | End: 2021-11-29
Attending: INTERNAL MEDICINE
Payer: COMMERCIAL

## 2021-11-29 DIAGNOSIS — Z79.52 LONG TERM CURRENT USE OF SYSTEMIC STEROIDS: ICD-10-CM

## 2021-11-29 DIAGNOSIS — Z13.820 SCREENING FOR OSTEOPOROSIS: ICD-10-CM

## 2021-11-29 PROCEDURE — 77080 DXA BONE DENSITY AXIAL: CPT | Performed by: INTERNAL MEDICINE

## 2021-11-29 NOTE — PROGRESS NOTES
SPINE EVALUATION:   Referring Physician: Dr. Toño Horowitz  Diagnosis: Osteoarthritis of cervical spine, unspecified spinal osteoarthritis complication status (J27.044)  Osteoarthritis of lumbar spine, unspecified spinal osteoarthritis complication status (P25. joint space.  No acute fracture    There is moderate degenerative change involving the right glenohumeral joint space with marginal inferior medial right femoral head osteophyte with some old posttraumatic changes noted involving the humeral head.  Moderate show Christy Ramey has significant shoulder ROM/strength deficits as well as cervical ROM limitations. She presents with noticeable UE flexibility restrictions and presents with pain/hypomobility with gentle grade 2 posterior/inferior GHJ glides.  Christy Raemy struggles degrees (pain)  IR: R; 60 degrees (pain) L; 61 degrees (pain)       Accessory motion: The patient presents with pain/hypomobility with gentle grade 2 posterior/inferior GHJ glides.     Palpation: The patient is tender to palpation at her left UT/Levator, sc Goals: (to be met in 8 visits)     · Pt will improve cervical AROM extension to at least 45 degrees to improve tolerance for putting dishes into overhead cabinets   · Pt will improve bilaateral cervical AROM rotation to > or = to 60 degrees to improve to this letter via fax as soon as possible to [phone].  If you have any questions, please contact me at Dept: [phone]    Sincerely,  Electronically signed by therapist: Dena Lennox, PT    [de-identified] certification required: Yes  I certify the need for

## 2021-11-30 ENCOUNTER — TELEPHONE (OUTPATIENT)
Dept: RHEUMATOLOGY | Facility: CLINIC | Age: 66
End: 2021-11-30

## 2021-11-30 ENCOUNTER — TELEPHONE (OUTPATIENT)
Dept: PHYSICAL THERAPY | Facility: HOSPITAL | Age: 66
End: 2021-11-30

## 2021-11-30 DIAGNOSIS — M81.0 AGE-RELATED OSTEOPOROSIS WITHOUT CURRENT PATHOLOGICAL FRACTURE: Primary | ICD-10-CM

## 2021-12-01 ENCOUNTER — OFFICE VISIT (OUTPATIENT)
Dept: PHYSICAL THERAPY | Age: 66
End: 2021-12-01
Attending: INTERNAL MEDICINE
Payer: COMMERCIAL

## 2021-12-01 ENCOUNTER — CLINICAL ABSTRACT (OUTPATIENT)
Dept: HEALTH INFORMATION MANAGEMENT | Facility: OTHER | Age: 66
End: 2021-12-01

## 2021-12-01 DIAGNOSIS — M47.812 OSTEOARTHRITIS OF CERVICAL SPINE, UNSPECIFIED SPINAL OSTEOARTHRITIS COMPLICATION STATUS: ICD-10-CM

## 2021-12-01 DIAGNOSIS — M47.816 OSTEOARTHRITIS OF LUMBAR SPINE, UNSPECIFIED SPINAL OSTEOARTHRITIS COMPLICATION STATUS: ICD-10-CM

## 2021-12-01 DIAGNOSIS — M25.511 BILATERAL SHOULDER PAIN, UNSPECIFIED CHRONICITY: ICD-10-CM

## 2021-12-01 DIAGNOSIS — M25.512 BILATERAL SHOULDER PAIN, UNSPECIFIED CHRONICITY: ICD-10-CM

## 2021-12-01 DIAGNOSIS — S99.911S INJURY OF RIGHT ANKLE, SEQUELA: ICD-10-CM

## 2021-12-01 PROCEDURE — 97163 PT EVAL HIGH COMPLEX 45 MIN: CPT

## 2021-12-01 PROCEDURE — 97110 THERAPEUTIC EXERCISES: CPT

## 2021-12-01 NOTE — PROGRESS NOTES
Called pt, left VM. Please call pt and let her know the DEXA showed osteoporosis. More labs that I just ordered, get these whenever, but the ones from 11/19 should be drawn after 4 weeks of leflunomide. Reiterated the following.        For bone health, norberto

## 2021-12-02 ENCOUNTER — SOCIAL WORK SERVICES (OUTPATIENT)
Dept: HEMATOLOGY/ONCOLOGY | Facility: HOSPITAL | Age: 66
End: 2021-12-02

## 2021-12-02 NOTE — PROGRESS NOTES
Patient called stating that [employer] informed her that they have not received update. Epic shows that forms were faxed to [facility] 11/16/21 with confirmation page uploaded. Informed patient, but sent copy to her via [employer] and resubmitted forms to [employer].

## 2021-12-06 ENCOUNTER — OFFICE VISIT (OUTPATIENT)
Dept: PHYSICAL THERAPY | Age: 66
End: 2021-12-06
Attending: INTERNAL MEDICINE
Payer: COMMERCIAL

## 2021-12-06 PROCEDURE — 97110 THERAPEUTIC EXERCISES: CPT

## 2021-12-06 NOTE — PROGRESS NOTES
Dx: Osteoarthritis of cervical spine, unspecified spinal osteoarthritis complication status (N76.988)  Osteoarthritis of lumbar spine, unspecified spinal osteoarthritis complication status (L03.965)  Bilateral shoulder pain, unspecified chronicity (M25.511 lumbar spine was assessed today. She leaves with VAS 6-7/10 shoulder and low back pain. Rose Callaway presents with core/hip/knee/ankle weakness globally that is limiting her functional ability. She also presents with flexibility and minor ROM deficits.  Her right progress achieved in PT   · Pt will improve transversus abdominis recruitment to perform proper isometric contraction without requiring verbal or tactile cuing to promote advancement of therex   · Pt will demonstrate good understanding of proper posture an

## 2021-12-07 ENCOUNTER — SOCIAL WORK SERVICES (OUTPATIENT)
Dept: HEMATOLOGY/ONCOLOGY | Facility: HOSPITAL | Age: 66
End: 2021-12-07

## 2021-12-07 NOTE — PROGRESS NOTES
completed [employer] Disability Form, faxing to M#[phone], and sending copy to patient via [address]

## 2021-12-10 ENCOUNTER — OFFICE VISIT (OUTPATIENT)
Dept: PHYSICAL THERAPY | Age: 66
End: 2021-12-10
Attending: INTERNAL MEDICINE
Payer: COMMERCIAL

## 2021-12-10 PROCEDURE — 97140 MANUAL THERAPY 1/> REGIONS: CPT

## 2021-12-10 PROCEDURE — 97110 THERAPEUTIC EXERCISES: CPT

## 2021-12-10 NOTE — PROGRESS NOTES
Dx: Osteoarthritis of cervical spine, unspecified spinal osteoarthritis complication status (L89.687)  Osteoarthritis of lumbar spine, unspecified spinal osteoarthritis complication status (Z50.848)  Bilateral shoulder pain, unspecified chronicity (M25.511 overhead reaching   · Pt will improve postural strength (mid/low trap) to 4/5 to promote improved upright posturing and decreased pain with sitting and reahinf  · Pt will improve bilateral shoulder flexion AROM to > or = to 140 degrees to be able to reach Shoulder distraction       Bilateral shoulder PROM       Rows w/ and w/o extension using YTB/RTB x 10 reps, 2 sets - 3 sec holds       Side lying flexion AROM w/ and w/o scapular stabilization assist x 10 reps, 2 sets each side       Shoulder AAROM reps in

## 2021-12-13 ENCOUNTER — HOSPITAL ENCOUNTER (OUTPATIENT)
Dept: MRI IMAGING | Age: 66
Discharge: HOME | End: 2021-12-13
Attending: INTERNAL MEDICINE
Payer: COMMERCIAL

## 2021-12-13 ENCOUNTER — APPOINTMENT (OUTPATIENT)
Dept: PHYSICAL THERAPY | Age: 66
End: 2021-12-13
Attending: INTERNAL MEDICINE
Payer: COMMERCIAL

## 2021-12-13 DIAGNOSIS — M54.2 CHRONIC NECK PAIN: ICD-10-CM

## 2021-12-13 DIAGNOSIS — M47.812 OSTEOARTHRITIS OF CERVICAL SPINE, UNSPECIFIED SPINAL OSTEOARTHRITIS COMPLICATION STATUS: ICD-10-CM

## 2021-12-13 DIAGNOSIS — M47.816 OSTEOARTHRITIS OF LUMBAR SPINE, UNSPECIFIED SPINAL OSTEOARTHRITIS COMPLICATION STATUS: ICD-10-CM

## 2021-12-13 DIAGNOSIS — G89.29 CHRONIC NECK PAIN: ICD-10-CM

## 2021-12-13 DIAGNOSIS — M19.011 OSTEOARTHRITIS OF RIGHT SHOULDER, UNSPECIFIED OSTEOARTHRITIS TYPE: ICD-10-CM

## 2021-12-13 PROCEDURE — 72141 MRI NECK SPINE W/O DYE: CPT | Performed by: INTERNAL MEDICINE

## 2021-12-13 PROCEDURE — 73221 MRI JOINT UPR EXTREM W/O DYE: CPT | Performed by: INTERNAL MEDICINE

## 2021-12-13 PROCEDURE — 72148 MRI LUMBAR SPINE W/O DYE: CPT | Performed by: INTERNAL MEDICINE

## 2021-12-14 ENCOUNTER — TELEPHONE (OUTPATIENT)
Dept: RHEUMATOLOGY | Facility: CLINIC | Age: 66
End: 2021-12-14

## 2021-12-15 ENCOUNTER — OFFICE VISIT (OUTPATIENT)
Dept: PHYSICAL THERAPY | Age: 66
End: 2021-12-15
Attending: INTERNAL MEDICINE
Payer: COMMERCIAL

## 2021-12-15 PROCEDURE — 97110 THERAPEUTIC EXERCISES: CPT

## 2021-12-15 PROCEDURE — 97140 MANUAL THERAPY 1/> REGIONS: CPT

## 2021-12-15 NOTE — PROGRESS NOTES
Dx: Osteoarthritis of cervical spine, unspecified spinal osteoarthritis complication status (F54.554)  Osteoarthritis of lumbar spine, unspecified spinal osteoarthritis complication status (P82.541)  Bilateral shoulder pain, unspecified chronicity (M25.511 improve tolerance for turning head to check blind spot while driving  · Pt will demonstrate improved cervical intrinsic strength to 4+/5 to allow improved cervical stabilization with overhead reaching   · Pt will improve postural strength (mid/low trap) to TX#: 5/ Date:    Tx#: 6/   Objective tests and measures, HEP review, and education - 45 mins -- Pulleys - 6 mins      UBE x 4 mins --      Grade 2-3 bilateral GHJ glides - posterior/inferior Grade 2-3 bilateral GHJ glides - posterior/inferior      Silva

## 2021-12-20 ENCOUNTER — APPOINTMENT (OUTPATIENT)
Dept: HEMATOLOGY/ONCOLOGY | Facility: HOSPITAL | Age: 66
End: 2021-12-20
Attending: INTERNAL MEDICINE
Payer: COMMERCIAL

## 2021-12-21 ENCOUNTER — APPOINTMENT (OUTPATIENT)
Dept: PHYSICAL THERAPY | Age: 66
End: 2021-12-21
Attending: INTERNAL MEDICINE
Payer: COMMERCIAL

## 2021-12-23 ENCOUNTER — APPOINTMENT (OUTPATIENT)
Dept: PHYSICAL THERAPY | Age: 66
End: 2021-12-23
Attending: INTERNAL MEDICINE
Payer: COMMERCIAL

## 2021-12-27 ENCOUNTER — APPOINTMENT (OUTPATIENT)
Dept: PHYSICAL THERAPY | Age: 66
End: 2021-12-27
Attending: INTERNAL MEDICINE
Payer: COMMERCIAL

## 2021-12-31 ENCOUNTER — LAB ENCOUNTER (OUTPATIENT)
Dept: LAB | Age: 66
End: 2021-12-31
Attending: INTERNAL MEDICINE
Payer: COMMERCIAL

## 2021-12-31 DIAGNOSIS — Z11.59 NEED FOR HEPATITIS B SCREENING TEST: ICD-10-CM

## 2021-12-31 DIAGNOSIS — M47.816 OSTEOARTHRITIS OF LUMBAR SPINE, UNSPECIFIED SPINAL OSTEOARTHRITIS COMPLICATION STATUS: ICD-10-CM

## 2021-12-31 DIAGNOSIS — Z51.81 THERAPEUTIC DRUG MONITORING: ICD-10-CM

## 2021-12-31 DIAGNOSIS — R76.8 HEPATITIS B CORE ANTIBODY POSITIVE: ICD-10-CM

## 2021-12-31 DIAGNOSIS — M81.0 AGE-RELATED OSTEOPOROSIS WITHOUT CURRENT PATHOLOGICAL FRACTURE: ICD-10-CM

## 2021-12-31 LAB
ALBUMIN SERPL-MCNC: 3.2 G/DL (ref 3.4–5)
ALBUMIN/GLOB SERPL: 0.9 {RATIO} (ref 1–2)
ALP LIVER SERPL-CCNC: 62 U/L
ALT SERPL-CCNC: 22 U/L
ANION GAP SERPL CALC-SCNC: 4 MMOL/L (ref 0–18)
AST SERPL-CCNC: 18 U/L (ref 15–37)
BASOPHILS # BLD AUTO: 0.05 X10(3) UL (ref 0–0.2)
BASOPHILS NFR BLD AUTO: 0.6 %
BILIRUB SERPL-MCNC: 0.4 MG/DL (ref 0.1–2)
BUN BLD-MCNC: 15 MG/DL (ref 7–18)
CALCIUM BLD-MCNC: 9 MG/DL (ref 8.5–10.1)
CHLORIDE SERPL-SCNC: 107 MMOL/L (ref 98–112)
CO2 SERPL-SCNC: 30 MMOL/L (ref 21–32)
CREAT BLD-MCNC: 0.55 MG/DL
CRP SERPL-MCNC: 4.49 MG/DL (ref ?–0.3)
EOSINOPHIL # BLD AUTO: 0.1 X10(3) UL (ref 0–0.7)
EOSINOPHIL NFR BLD AUTO: 1.1 %
ERYTHROCYTE [DISTWIDTH] IN BLOOD BY AUTOMATED COUNT: 14.9 %
ERYTHROCYTE [SEDIMENTATION RATE] IN BLOOD: 42 MM/HR
FASTING STATUS PATIENT QL REPORTED: YES
GLOBULIN PLAS-MCNC: 3.4 G/DL (ref 2.8–4.4)
GLUCOSE BLD-MCNC: 105 MG/DL (ref 70–99)
HCT VFR BLD AUTO: 36.8 %
HGB BLD-MCNC: 11.3 G/DL
IMM GRANULOCYTES # BLD AUTO: 0.03 X10(3) UL (ref 0–1)
IMM GRANULOCYTES NFR BLD: 0.3 %
LYMPHOCYTES # BLD AUTO: 0.56 X10(3) UL (ref 1–4)
LYMPHOCYTES NFR BLD AUTO: 6.3 %
MAGNESIUM SERPL-MCNC: 2.3 MG/DL (ref 1.6–2.6)
MCH RBC QN AUTO: 28.4 PG (ref 26–34)
MCHC RBC AUTO-ENTMCNC: 30.7 G/DL (ref 31–37)
MCV RBC AUTO: 92.5 FL
MONOCYTES # BLD AUTO: 0.53 X10(3) UL (ref 0.1–1)
MONOCYTES NFR BLD AUTO: 6 %
NEUTROPHILS # BLD AUTO: 7.62 X10 (3) UL (ref 1.5–7.7)
NEUTROPHILS # BLD AUTO: 7.62 X10(3) UL (ref 1.5–7.7)
NEUTROPHILS NFR BLD AUTO: 85.7 %
OSMOLALITY SERPL CALC.SUM OF ELEC: 293 MOSM/KG (ref 275–295)
PHOSPHATE SERPL-MCNC: 3.7 MG/DL (ref 2.5–4.9)
PLATELET # BLD AUTO: 364 10(3)UL (ref 150–450)
POTASSIUM SERPL-SCNC: 4.7 MMOL/L (ref 3.5–5.1)
PROT SERPL-MCNC: 6.6 G/DL (ref 6.4–8.2)
PTH-INTACT SERPL-MCNC: 61.4 PG/ML (ref 18.5–88)
RBC # BLD AUTO: 3.98 X10(6)UL
SODIUM SERPL-SCNC: 141 MMOL/L (ref 136–145)
TSI SER-ACNC: 1.05 MIU/ML (ref 0.36–3.74)
VIT D+METAB SERPL-MCNC: 30.9 NG/ML (ref 30–100)
WBC # BLD AUTO: 8.9 X10(3) UL (ref 4–11)

## 2021-12-31 PROCEDURE — 83970 ASSAY OF PARATHORMONE: CPT | Performed by: INTERNAL MEDICINE

## 2021-12-31 PROCEDURE — 87517 HEPATITIS B DNA QUANT: CPT | Performed by: INTERNAL MEDICINE

## 2021-12-31 PROCEDURE — 82306 VITAMIN D 25 HYDROXY: CPT | Performed by: INTERNAL MEDICINE

## 2021-12-31 PROCEDURE — 84100 ASSAY OF PHOSPHORUS: CPT | Performed by: INTERNAL MEDICINE

## 2021-12-31 PROCEDURE — 80050 GENERAL HEALTH PANEL: CPT | Performed by: INTERNAL MEDICINE

## 2021-12-31 PROCEDURE — 83735 ASSAY OF MAGNESIUM: CPT | Performed by: INTERNAL MEDICINE

## 2021-12-31 PROCEDURE — 86140 C-REACTIVE PROTEIN: CPT | Performed by: INTERNAL MEDICINE

## 2021-12-31 PROCEDURE — 85652 RBC SED RATE AUTOMATED: CPT | Performed by: INTERNAL MEDICINE

## 2022-01-02 ENCOUNTER — TELEPHONE (OUTPATIENT)
Dept: RHEUMATOLOGY | Facility: CLINIC | Age: 67
End: 2022-01-02

## 2022-01-02 DIAGNOSIS — M06.4 INFLAMMATORY POLYARTHRITIS (HCC): ICD-10-CM

## 2022-01-02 RX ORDER — LEFLUNOMIDE 10 MG/1
20 TABLET ORAL DAILY
Qty: 60 TABLET | Refills: 0 | Status: SHIPPED | OUTPATIENT
Start: 2022-01-02 | End: 2022-01-05

## 2022-01-02 NOTE — PROGRESS NOTES
Dear Josey Mcmullen    Inflammation is a bit more elevated than last time. It could be from the bug/cold that you caught around 10 days ago, did you end up recovering completely? Blood count, liver tests, kidney function are stable.  I refilled the lefl

## 2022-01-03 ENCOUNTER — OFFICE VISIT (OUTPATIENT)
Dept: HEMATOLOGY/ONCOLOGY | Facility: HOSPITAL | Age: 67
End: 2022-01-03
Attending: INTERNAL MEDICINE
Payer: COMMERCIAL

## 2022-01-03 VITALS
SYSTOLIC BLOOD PRESSURE: 120 MMHG | BODY MASS INDEX: 18.93 KG/M2 | HEIGHT: 62.99 IN | TEMPERATURE: 98 F | RESPIRATION RATE: 18 BRPM | DIASTOLIC BLOOD PRESSURE: 70 MMHG | WEIGHT: 106.81 LBS | HEART RATE: 85 BPM | OXYGEN SATURATION: 94 %

## 2022-01-03 DIAGNOSIS — C78.1 NON-SMALL CELL LUNG CANCER METASTATIC TO MEDIASTINUM (HCC): Primary | ICD-10-CM

## 2022-01-03 DIAGNOSIS — M12.9 ARTHRITIS, MULTIPLE JOINT INVOLVEMENT: ICD-10-CM

## 2022-01-03 DIAGNOSIS — C34.90 NON-SMALL CELL LUNG CANCER METASTATIC TO MEDIASTINUM (HCC): Primary | ICD-10-CM

## 2022-01-03 LAB
HBV QNT BY NAAT (IU/ML): NOT DETECTED IU/ML
HBV QNT BY NAAT (LOG IU/ML): NOT DETECTED LOG IU/ML
HBV QNT BY NAAT INTERP: NOT DETECTED

## 2022-01-03 PROCEDURE — 99215 OFFICE O/P EST HI 40 MIN: CPT | Performed by: INTERNAL MEDICINE

## 2022-01-03 NOTE — PROGRESS NOTES
Cancer Center Progress Note  Patient Name: Bryan    YOB: 1955   Medical Record Number: [id number]   CSN: [id number]   Attending Physician: Maggy Meza M.D.        Date of Visit: 1/3/2022     Chief Complaint:  Patient prese steadfastly refused to consider any further biopsy or workup. After discussing treatment options for presumed stage IV lung cancer, we settled on combined immunotherapy.   She developed severe arthritis, chronic fever, and rash that prompted holding the im Narrative      Not on file    Social Determinants of Health  Financial Resource Strain: Not on file  Food Insecurity: Not on file  Transportation Needs: Not on file  Physical Activity: Not on file  Stress: Not on file  Social Connections: Not on file  Inti (Calculated - sq m): 1.48 sq meters (01/03 1105)  Pulse: 85 (01/03 1105)  BP: 120/70 (01/03 1105)  Temp: 97.8 °F (36.6 °C) (01/03 1105)  Do Not Use - Resp Rate: --  SpO2: 94 % (01/03 1105)      Physical Examination:    Constitutional Normal - Mood and affe Final Diagnosis:   A.  EBUS guided fine-needle aspiration of 4R lymph node:  -Adequate for evaluation. -POSITIVE for metastatic adenocarcinoma; consistent with known lung primary. -(See comment).     B.  EBUS guided fine-needle aspiration of left appears on PET. We discussed treatment options, including palliative chemo, chemo-immunotherapy with ipi/nivo and 2 cycles of chemotherapy, or just immunotherapy with Ipi/Nivo. With PDL-1 only 11-20%, I don not recommend Keyrtuda alone.   She does not want medical decisionmaking: Malignancy monitoring and combined immunotherapy management. Electronically Signed by: Keesha Beltre M.D.   [facility]

## 2022-01-05 DIAGNOSIS — M06.4 INFLAMMATORY POLYARTHRITIS (HCC): ICD-10-CM

## 2022-01-05 RX ORDER — LEFLUNOMIDE 20 MG/1
20 TABLET ORAL DAILY
Qty: 90 TABLET | Refills: 0 | Status: SHIPPED | OUTPATIENT
Start: 2022-01-05 | End: 2022-01-21

## 2022-01-05 NOTE — PROGRESS NOTES
Please call pt, HBV PCR negative which is good. CRP a bit elevated. Not sure if this is from the arthritis. If arthritis is worse, could consider shoulder aspiration + cortisone injection sooner than next RTC if needed.  If worried about pain/needles could

## 2022-01-05 NOTE — TELEPHONE ENCOUNTER
LOV 11-19-21  Future Appointments   Date Time Provider Arlin Rich   1/21/2022 11:30 AM Mohamud Prince MD [facility] [id number]

## 2022-01-05 NOTE — TELEPHONE ENCOUNTER
Please call pt, refilled 3 months supply of 20 mg tab leflunomide.  Same dose, bigger dosed pill, less overall money/cost.

## 2022-01-20 ENCOUNTER — DOCUMENTATION ONLY (OUTPATIENT)
Dept: HEMATOLOGY/ONCOLOGY | Facility: HOSPITAL | Age: 67
End: 2022-01-20

## 2022-01-20 NOTE — PROGRESS NOTES
Nutrition rescreen complete as triggered by Best Practice dx of Non-small cell lung cancer metastatic to mediastinum. Chart reviewed. Pt appears nutritionally stable at present. RD available on consult.

## 2022-01-21 ENCOUNTER — OFFICE VISIT (OUTPATIENT)
Dept: RHEUMATOLOGY | Facility: CLINIC | Age: 67
End: 2022-01-21
Payer: COMMERCIAL

## 2022-01-21 VITALS
HEART RATE: 99 BPM | HEIGHT: 64 IN | DIASTOLIC BLOOD PRESSURE: 60 MMHG | WEIGHT: 106 LBS | BODY MASS INDEX: 18.1 KG/M2 | SYSTOLIC BLOOD PRESSURE: 103 MMHG | OXYGEN SATURATION: 99 %

## 2022-01-21 DIAGNOSIS — C78.1 NON-SMALL CELL LUNG CANCER METASTATIC TO MEDIASTINUM (HCC): ICD-10-CM

## 2022-01-21 DIAGNOSIS — M19.011 OSTEOARTHRITIS OF RIGHT SHOULDER, UNSPECIFIED OSTEOARTHRITIS TYPE: ICD-10-CM

## 2022-01-21 DIAGNOSIS — M19.011 ARTHRITIS OF RIGHT GLENOHUMERAL JOINT: ICD-10-CM

## 2022-01-21 DIAGNOSIS — M06.4 INFLAMMATORY POLYARTHRITIS (HCC): Primary | ICD-10-CM

## 2022-01-21 DIAGNOSIS — M75.01 ADHESIVE CAPSULITIS OF RIGHT SHOULDER: ICD-10-CM

## 2022-01-21 DIAGNOSIS — M81.0 AGE-RELATED OSTEOPOROSIS WITHOUT CURRENT PATHOLOGICAL FRACTURE: ICD-10-CM

## 2022-01-21 DIAGNOSIS — Z92.89 HISTORY OF IMMUNOTHERAPY: ICD-10-CM

## 2022-01-21 DIAGNOSIS — C34.90 NON-SMALL CELL LUNG CANCER METASTATIC TO MEDIASTINUM (HCC): ICD-10-CM

## 2022-01-21 PROCEDURE — 3078F DIAST BP <80 MM HG: CPT | Performed by: INTERNAL MEDICINE

## 2022-01-21 PROCEDURE — 99215 OFFICE O/P EST HI 40 MIN: CPT | Performed by: INTERNAL MEDICINE

## 2022-01-21 PROCEDURE — 3074F SYST BP LT 130 MM HG: CPT | Performed by: INTERNAL MEDICINE

## 2022-01-21 PROCEDURE — 3008F BODY MASS INDEX DOCD: CPT | Performed by: INTERNAL MEDICINE

## 2022-01-21 RX ORDER — PREDNISONE 1 MG/1
5 TABLET ORAL
Qty: 450 TABLET | Refills: 0 | Status: SHIPPED | OUTPATIENT
Start: 2022-01-21 | End: 2022-04-21

## 2022-01-21 RX ORDER — LEFLUNOMIDE 20 MG/1
20 TABLET ORAL DAILY
Qty: 90 TABLET | Refills: 0 | Status: SHIPPED | OUTPATIENT
Start: 2022-01-21

## 2022-01-21 RX ORDER — MELOXICAM 7.5 MG/1
TABLET ORAL DAILY
Qty: 60 TABLET | Refills: 0 | Status: SHIPPED | OUTPATIENT
Start: 2022-01-21

## 2022-01-21 NOTE — PROGRESS NOTES
Rheumatology f/u Patient Note  =====================================================================================================    Patient presents with:   Follow - Up: LOV 11/19/21, in a lot of pain and more fatigued then usual. Had labs 12/31 and chiropractor and x-rays were done. Reportedly it showed lumbar DJD. SOB has not been better. Today, arthritis is better. Right shoulder still bothersome. This has been chronic in nature and has been present for 2 years.   Rash is gone today, but mji daily., Disp: 90 tablet, Rfl: 0  Meloxicam 7.5 MG Oral Tab, Take 1-2 tablets (7.5-15 mg total) by mouth daily. , Disp: 60 tablet, Rfl: 0  Turmeric (QC TUMERIC COMPLEX OR), Take by mouth., Disp: , Rfl:   predniSONE 5 MG Oral Tab, Take 4 tablets (20 mg total) 99%   Weight: 106 lb (48.1 kg)   Height: 5' 4\" (1.626 m)       GEN: NAD, well-nourished. HEENT: Head: NCAT. Face: No lesions. Eyes: Conjunctiva clear. Sclera are anicteric. PERRLA.  EOMs   PULM:  easy effort  Extremities: No cyanosis, edema or deformitie Results   Component Value Date    DSDNA <100 10/25/2021    SMUD 143 (H) 10/25/2021     (H) 10/25/2021     Lab Results   Component Value Date    SCL70 <100 10/25/2021     Lab Results   Component Value Date    C3 86.4 (L) 11/03/2021    C4 20.7 11/03/2 Previously noted bilateral axillary lymphadenopathy is decreased. No significant lymphadenopathy in the chest, abdomen and pelvis. 2. Parenchymal abnormalities the lungs are stable. 3. Left adnexal lesion is stable.     Dictated by (CST): Esther Sampson changes in the cervical spine without significant spinal canal stenosis at any level.       2. Mild to moderate left neural foraminal stenosis at C4-5 and C6-7.       3.  No focal spinal cord signal abnormality identified.            ======================= complex was negative.   This means that the initial positive TOMEKA and low titer Cr[place]tree and low titer RNP were all false positives.      ==============================================================================================================      Lab Res total) daily with breakfast x 10 days. -If you start to flare as you are decreasing the prednisone, increase back to the original dose of prednisone that you were feeling well on and let me know.      -Previously discussed methotrexate but patient declines without current pathological fracture        No follow-ups on file. The above plan of care, diagnosis, orders, and follow-up were discussed with the patient. Questions related to this recommended plan of care were answered.     Thank you for referring

## 2022-01-21 NOTE — PATIENT INSTRUCTIONS
Schedule bloodwork on 1/31/2022. Call [facility] scheduling line to set the lab/imaging/procedure appointment up. Phone number is [phone].

## 2022-01-22 ENCOUNTER — HOSPITAL ENCOUNTER (OUTPATIENT)
Dept: CT IMAGING | Age: 67
Discharge: HOME | End: 2022-01-22
Attending: INTERNAL MEDICINE
Payer: COMMERCIAL

## 2022-01-22 DIAGNOSIS — C34.90 NON-SMALL CELL LUNG CANCER METASTATIC TO MEDIASTINUM (HCC): ICD-10-CM

## 2022-01-22 DIAGNOSIS — C78.1 NON-SMALL CELL LUNG CANCER METASTATIC TO MEDIASTINUM (HCC): ICD-10-CM

## 2022-01-22 PROBLEM — M19.011 ARTHRITIS OF RIGHT GLENOHUMERAL JOINT: Status: ACTIVE | Noted: 2022-01-22

## 2022-01-22 PROBLEM — Z92.89 HISTORY OF IMMUNOTHERAPY: Status: ACTIVE | Noted: 2022-01-22

## 2022-01-22 PROCEDURE — 74177 CT ABD & PELVIS W/CONTRAST: CPT | Performed by: INTERNAL MEDICINE

## 2022-01-22 PROCEDURE — 71260 CT THORAX DX C+: CPT | Performed by: INTERNAL MEDICINE

## 2022-01-22 RX ORDER — IOHEXOL 350 MG/ML
100 INJECTION, SOLUTION INTRAVENOUS
Status: COMPLETED | OUTPATIENT
Start: 2022-01-22 | End: 2022-01-22

## 2022-01-22 RX ORDER — HYLAN G-F 20 16MG/2ML
6 SYRINGE (ML) INTRAARTICULAR ONCE
Qty: 6 ML | Refills: 0 | OUTPATIENT
Start: 2022-01-22 | End: 2022-01-22

## 2022-01-22 RX ADMIN — IOHEXOL 100 ML: 350 INJECTION, SOLUTION INTRAVENOUS at 14:29:00

## 2022-01-25 ENCOUNTER — TELEPHONE (OUTPATIENT)
Dept: RHEUMATOLOGY | Facility: CLINIC | Age: 67
End: 2022-01-25

## 2022-01-25 NOTE — TELEPHONE ENCOUNTER
Phoned healht plan, PA required through [employer] [facility]([phone]). Phoned to initiate PA shoulder injection with Synvisc one. L on secure line to return our call.  States they will return our call within 24hrs to initiate this PA

## 2022-01-25 NOTE — PROGRESS NOTES
Phoned healht plan, PA required through [employer] [facility](ph.[phone]). Phoned to initiate PA shoulder injection with Synvisc one. L on secure line to return our call.  States they will return our call within 24hrs to initiate this PA

## 2022-01-28 ENCOUNTER — TELEPHONE (OUTPATIENT)
Dept: RHEUMATOLOGY | Facility: CLINIC | Age: 67
End: 2022-01-28

## 2022-01-29 NOTE — TELEPHONE ENCOUNTER
Please call insurance to clarify details required. Physical therapy details per my PT referral on 11/19/2021 and PT notes in [facility] system.

## 2022-01-31 ENCOUNTER — OFFICE VISIT (OUTPATIENT)
Dept: HEMATOLOGY/ONCOLOGY | Facility: HOSPITAL | Age: 67
End: 2022-01-31
Attending: INTERNAL MEDICINE
Payer: COMMERCIAL

## 2022-01-31 VITALS
HEART RATE: 89 BPM | BODY MASS INDEX: 18.33 KG/M2 | WEIGHT: 107.38 LBS | HEIGHT: 64.02 IN | SYSTOLIC BLOOD PRESSURE: 102 MMHG | DIASTOLIC BLOOD PRESSURE: 68 MMHG | RESPIRATION RATE: 16 BRPM | TEMPERATURE: 98 F | OXYGEN SATURATION: 97 %

## 2022-01-31 DIAGNOSIS — M06.4 INFLAMMATORY POLYARTHRITIS (HCC): ICD-10-CM

## 2022-01-31 DIAGNOSIS — C34.90 NON-SMALL CELL LUNG CANCER METASTATIC TO MEDIASTINUM (HCC): Primary | ICD-10-CM

## 2022-01-31 DIAGNOSIS — C78.1 NON-SMALL CELL LUNG CANCER METASTATIC TO MEDIASTINUM (HCC): Primary | ICD-10-CM

## 2022-01-31 DIAGNOSIS — C34.90 NON-SMALL CELL LUNG CANCER WITH METASTASIS (HCC): ICD-10-CM

## 2022-01-31 LAB
ALBUMIN/GLOB SERPL: 0.7 {RATIO} (ref 1–2)
ALP LIVER SERPL-CCNC: 69 U/L
ALT SERPL-CCNC: 16 U/L
ANION GAP SERPL CALC-SCNC: 2 MMOL/L (ref 0–18)
AST SERPL-CCNC: 16 U/L (ref 15–37)
BASOPHILS # BLD AUTO: 0.03 X10(3) UL (ref 0–0.2)
BASOPHILS NFR BLD AUTO: 0.4 %
BILIRUB SERPL-MCNC: 0.3 MG/DL (ref 0.1–2)
BUN BLD-MCNC: 21 MG/DL (ref 7–18)
CALCIUM BLD-MCNC: 9.3 MG/DL (ref 8.5–10.1)
CHLORIDE SERPL-SCNC: 109 MMOL/L (ref 98–112)
CO2 SERPL-SCNC: 29 MMOL/L (ref 21–32)
CREAT BLD-MCNC: 0.56 MG/DL
CRP SERPL-MCNC: 7.1 MG/DL (ref ?–0.3)
EOSINOPHIL # BLD AUTO: 0.21 X10(3) UL (ref 0–0.7)
EOSINOPHIL NFR BLD AUTO: 2.8 %
ERYTHROCYTE [DISTWIDTH] IN BLOOD BY AUTOMATED COUNT: 14.5 %
ERYTHROCYTE [SEDIMENTATION RATE] IN BLOOD: 39 MM/HR
FASTING STATUS PATIENT QL REPORTED: NO
GLOBULIN PLAS-MCNC: 4.2 G/DL (ref 2.8–4.4)
GLUCOSE BLD-MCNC: 100 MG/DL (ref 70–99)
HGB BLD-MCNC: 11.4 G/DL
IMM GRANULOCYTES # BLD AUTO: 0.03 X10(3) UL (ref 0–1)
IMM GRANULOCYTES NFR BLD: 0.4 %
LYMPHOCYTES # BLD AUTO: 0.88 X10(3) UL (ref 1–4)
LYMPHOCYTES NFR BLD AUTO: 11.6 %
MCH RBC QN AUTO: 28.2 PG (ref 26–34)
MCHC RBC AUTO-ENTMCNC: 31.1 G/DL (ref 31–37)
MCV RBC AUTO: 90.6 FL
MONOCYTES # BLD AUTO: 0.8 X10(3) UL (ref 0.1–1)
MONOCYTES NFR BLD AUTO: 10.5 %
NEUTROPHILS # BLD AUTO: 5.64 X10 (3) UL (ref 1.5–7.7)
NEUTROPHILS # BLD AUTO: 5.64 X10(3) UL (ref 1.5–7.7)
NEUTROPHILS NFR BLD AUTO: 74.3 %
OSMOLALITY SERPL CALC.SUM OF ELEC: 293 MOSM/KG (ref 275–295)
PLATELET # BLD AUTO: 361 10(3)UL (ref 150–450)
POTASSIUM SERPL-SCNC: 5.2 MMOL/L (ref 3.5–5.1)
PROT SERPL-MCNC: 7.1 G/DL (ref 6.4–8.2)
RBC # BLD AUTO: 4.04 X10(6)UL
SODIUM SERPL-SCNC: 140 MMOL/L (ref 136–145)
WBC # BLD AUTO: 7.6 X10(3) UL (ref 4–11)

## 2022-01-31 PROCEDURE — 99214 OFFICE O/P EST MOD 30 MIN: CPT | Performed by: INTERNAL MEDICINE

## 2022-01-31 RX ORDER — IBUPROFEN 200 MG
600 TABLET ORAL DAILY
COMMUNITY

## 2022-01-31 NOTE — TELEPHONE ENCOUNTER
Phoned pt, pt states she has spoke to billing and all information was given to health plan. Will contact our office with additional questions or concerns.

## 2022-01-31 NOTE — PROGRESS NOTES
Outpatient Oncology Care Plan  Problem list:  knowledge deficit    Problems related to:    disease/disease progression    Interventions:  provided general teaching    Expected outcomes:  understands plan of care    Progress towards outcome:  making progress    Education Record    Learner:  Patient and Spouse  Barriers / Limitations:  None  Method:  Brief focused  Outcome:  Shows understanding  Comments:  Pt here for follow up. Pt is taking 4 mg of prednisone daily. Pt complains of pain in her arms, shoulders and neck. She complains of fatigue and heartburn.

## 2022-02-01 NOTE — PROGRESS NOTES
Phoned Man[facility]. Our office has not heard from them as directed in previous telephone encounter. Initiated PA Synvisc one.  Pending clinicals and Faxed to [phone]  [facility]#[id number]

## 2022-02-07 ENCOUNTER — TELEPHONE (OUTPATIENT)
Dept: HEMATOLOGY/ONCOLOGY | Facility: HOSPITAL | Age: 67
End: 2022-02-07

## 2022-02-07 NOTE — TELEPHONE ENCOUNTER
Patient called and wants to know if the COVID vaccine is safe when she is on leflunomide  20 MG, and she is on prednisone,3 mg. Patient is getting a low grade fever back. Tatyana's number is [phone]. Thank you.

## 2022-02-07 NOTE — TELEPHONE ENCOUNTER
Patient of Dr Karen Menon - metastatic NSCLC who received ipilimumab/nivolumab. Treatment stopped in June, 2021 due to side effects. Dr Perri Gamboa, Rheumatology has her taking leflunomide and prednisone. She is taking 3mg prednisone currently. Low Grade Fever/Runny Nose/Cough: (Since Saturday the patient has been running a low grade fever of 99.4 - 99.6. She has a runny nose and cough. She denies other cold/flu Covid 19 symptoms. She has not received any Covid 19 vaccination.)    Cirilo Reinoso wants to know if it is safe for her to get a Covid 19 vaccination while she is on leflunomide, prednisone and having low grade fevers? Based on her symptoms, I recommended she get tested for Covid 19. If she is Covid positive, she can't be vaccinated at this time. I recommended she call and update her PCP. Cirilo Reinoso said she wants Dr Shaquille Florentino advice. I assured her I would forward her message to him and his nurse.

## 2022-02-20 ENCOUNTER — PATIENT MESSAGE (OUTPATIENT)
Dept: RHEUMATOLOGY | Facility: CLINIC | Age: 67
End: 2022-02-20

## 2022-03-01 NOTE — TELEPHONE ENCOUNTER
From: Ivan Celeste MD  To: [facility]  Sent: 2/20/2022 7:59 PM CST  Subject: gel injections    Hi Bryan Ormond back from insurance. Unfortunately, the gel injection for the shoulder was not approved. How is the shoulder pain currently? Hopefully the meloxicam is helping. It looks like you were prescribed a pain medication called tramadol in the past. Did this help? If it did, I can prescribe this if the pain gets really bad.     Regards,    Dr. Baldo Patel

## 2022-03-04 ENCOUNTER — LAB ENCOUNTER (OUTPATIENT)
Dept: LAB | Facility: HOSPITAL | Age: 67
End: 2022-03-04
Attending: INTERNAL MEDICINE
Payer: COMMERCIAL

## 2022-03-04 DIAGNOSIS — C78.1 NON-SMALL CELL LUNG CANCER METASTATIC TO MEDIASTINUM (HCC): ICD-10-CM

## 2022-03-04 DIAGNOSIS — M06.4 INFLAMMATORY POLYARTHRITIS (HCC): ICD-10-CM

## 2022-03-04 DIAGNOSIS — C34.90 NON-SMALL CELL LUNG CANCER METASTATIC TO MEDIASTINUM (HCC): ICD-10-CM

## 2022-03-04 DIAGNOSIS — Z51.81 THERAPEUTIC DRUG MONITORING: ICD-10-CM

## 2022-03-04 LAB
ALBUMIN SERPL-MCNC: 3 G/DL (ref 3.4–5)
ALBUMIN/GLOB SERPL: 0.8 {RATIO} (ref 1–2)
ALP LIVER SERPL-CCNC: 70 U/L
ALT SERPL-CCNC: 15 U/L
ANION GAP SERPL CALC-SCNC: 4 MMOL/L (ref 0–18)
AST SERPL-CCNC: 16 U/L (ref 15–37)
BASOPHILS # BLD AUTO: 0.06 X10(3) UL (ref 0–0.2)
BASOPHILS NFR BLD AUTO: 0.8 %
BILIRUB SERPL-MCNC: 0.3 MG/DL (ref 0.1–2)
BUN BLD-MCNC: 12 MG/DL (ref 7–18)
CALCIUM BLD-MCNC: 9.2 MG/DL (ref 8.5–10.1)
CHLORIDE SERPL-SCNC: 106 MMOL/L (ref 98–112)
CO2 SERPL-SCNC: 30 MMOL/L (ref 21–32)
CREAT BLD-MCNC: 0.54 MG/DL
CRP SERPL-MCNC: 4.72 MG/DL (ref ?–0.3)
EOSINOPHIL # BLD AUTO: 0.6 X10(3) UL (ref 0–0.7)
EOSINOPHIL NFR BLD AUTO: 7.5 %
ERYTHROCYTE [DISTWIDTH] IN BLOOD BY AUTOMATED COUNT: 14.6 %
ERYTHROCYTE [SEDIMENTATION RATE] IN BLOOD: 38 MM/HR
FASTING STATUS PATIENT QL REPORTED: NO
GLOBULIN PLAS-MCNC: 3.6 G/DL (ref 2.8–4.4)
GLUCOSE BLD-MCNC: 130 MG/DL (ref 70–99)
HCT VFR BLD AUTO: 35.9 %
HGB BLD-MCNC: 10.6 G/DL
IMM GRANULOCYTES # BLD AUTO: 0.02 X10(3) UL (ref 0–1)
IMM GRANULOCYTES NFR BLD: 0.3 %
LYMPHOCYTES # BLD AUTO: 1.01 X10(3) UL (ref 1–4)
LYMPHOCYTES NFR BLD AUTO: 12.7 %
MCH RBC QN AUTO: 26.9 PG (ref 26–34)
MCHC RBC AUTO-ENTMCNC: 29.5 G/DL (ref 31–37)
MCV RBC AUTO: 91.1 FL
MONOCYTES # BLD AUTO: 0.69 X10(3) UL (ref 0.1–1)
MONOCYTES NFR BLD AUTO: 8.6 %
NEUTROPHILS # BLD AUTO: 5.6 X10 (3) UL (ref 1.5–7.7)
NEUTROPHILS # BLD AUTO: 5.6 X10(3) UL (ref 1.5–7.7)
NEUTROPHILS NFR BLD AUTO: 70.1 %
OSMOLALITY SERPL CALC.SUM OF ELEC: 292 MOSM/KG (ref 275–295)
PLATELET # BLD AUTO: 321 10(3)UL (ref 150–450)
POTASSIUM SERPL-SCNC: 5.1 MMOL/L (ref 3.5–5.1)
PROT SERPL-MCNC: 6.6 G/DL (ref 6.4–8.2)
RBC # BLD AUTO: 3.94 X10(6)UL
SODIUM SERPL-SCNC: 140 MMOL/L (ref 136–145)
WBC # BLD AUTO: 8 X10(3) UL (ref 4–11)

## 2022-03-04 PROCEDURE — 80053 COMPREHEN METABOLIC PANEL: CPT

## 2022-03-04 PROCEDURE — 85652 RBC SED RATE AUTOMATED: CPT

## 2022-03-04 PROCEDURE — 85025 COMPLETE CBC W/AUTO DIFF WBC: CPT

## 2022-03-04 PROCEDURE — 86140 C-REACTIVE PROTEIN: CPT

## 2022-03-04 PROCEDURE — 36415 COLL VENOUS BLD VENIPUNCTURE: CPT

## 2022-03-21 ENCOUNTER — OFFICE VISIT (OUTPATIENT)
Dept: RHEUMATOLOGY | Facility: CLINIC | Age: 67
End: 2022-03-21
Payer: COMMERCIAL

## 2022-03-21 VITALS
WEIGHT: 106.69 LBS | HEIGHT: 64 IN | DIASTOLIC BLOOD PRESSURE: 75 MMHG | HEART RATE: 85 BPM | SYSTOLIC BLOOD PRESSURE: 120 MMHG | BODY MASS INDEX: 18.21 KG/M2 | OXYGEN SATURATION: 98 %

## 2022-03-21 DIAGNOSIS — C78.1 NON-SMALL CELL LUNG CANCER METASTATIC TO MEDIASTINUM (HCC): ICD-10-CM

## 2022-03-21 DIAGNOSIS — M06.4 INFLAMMATORY POLYARTHRITIS (HCC): Primary | ICD-10-CM

## 2022-03-21 DIAGNOSIS — M47.812 OSTEOARTHRITIS OF CERVICAL SPINE, UNSPECIFIED SPINAL OSTEOARTHRITIS COMPLICATION STATUS: ICD-10-CM

## 2022-03-21 DIAGNOSIS — C34.90 NON-SMALL CELL LUNG CANCER METASTATIC TO MEDIASTINUM (HCC): ICD-10-CM

## 2022-03-21 DIAGNOSIS — M19.011 ARTHRITIS OF RIGHT GLENOHUMERAL JOINT: ICD-10-CM

## 2022-03-21 DIAGNOSIS — Z92.89 HISTORY OF IMMUNOTHERAPY: ICD-10-CM

## 2022-03-21 DIAGNOSIS — Z51.81 THERAPEUTIC DRUG MONITORING: ICD-10-CM

## 2022-03-21 PROCEDURE — 99214 OFFICE O/P EST MOD 30 MIN: CPT | Performed by: INTERNAL MEDICINE

## 2022-03-21 PROCEDURE — 3078F DIAST BP <80 MM HG: CPT | Performed by: INTERNAL MEDICINE

## 2022-03-21 PROCEDURE — 3074F SYST BP LT 130 MM HG: CPT | Performed by: INTERNAL MEDICINE

## 2022-03-21 PROCEDURE — 3008F BODY MASS INDEX DOCD: CPT | Performed by: INTERNAL MEDICINE

## 2022-03-21 RX ORDER — LEFLUNOMIDE 20 MG/1
20 TABLET ORAL DAILY
Qty: 90 TABLET | Refills: 0 | Status: SHIPPED | OUTPATIENT
Start: 2022-03-21

## 2022-04-30 ENCOUNTER — HOSPITAL ENCOUNTER (OUTPATIENT)
Dept: CT IMAGING | Age: 67
Discharge: HOME | End: 2022-04-30
Attending: INTERNAL MEDICINE
Payer: COMMERCIAL

## 2022-04-30 ENCOUNTER — LAB ENCOUNTER (OUTPATIENT)
Dept: LAB | Age: 67
End: 2022-04-30
Attending: INTERNAL MEDICINE
Payer: COMMERCIAL

## 2022-04-30 DIAGNOSIS — C34.90 NON-SMALL CELL LUNG CANCER WITH METASTASIS (HCC): ICD-10-CM

## 2022-04-30 DIAGNOSIS — D64.9 ANEMIA, UNSPECIFIED TYPE: ICD-10-CM

## 2022-04-30 DIAGNOSIS — C34.90 NON-SMALL CELL LUNG CANCER METASTATIC TO MEDIASTINUM (HCC): ICD-10-CM

## 2022-04-30 DIAGNOSIS — C78.1 NON-SMALL CELL LUNG CANCER METASTATIC TO MEDIASTINUM (HCC): ICD-10-CM

## 2022-04-30 DIAGNOSIS — M06.4 INFLAMMATORY POLYARTHRITIS (HCC): ICD-10-CM

## 2022-04-30 DIAGNOSIS — Z92.89 HISTORY OF IMMUNOTHERAPY: ICD-10-CM

## 2022-04-30 LAB
ALBUMIN SERPL-MCNC: 2.7 G/DL (ref 3.4–5)
ALBUMIN/GLOB SERPL: 0.7 {RATIO} (ref 1–2)
ALP LIVER SERPL-CCNC: 77 U/L
ALT SERPL-CCNC: 12 U/L
ANION GAP SERPL CALC-SCNC: 7 MMOL/L (ref 0–18)
AST SERPL-CCNC: 5 U/L (ref 15–37)
BASOPHILS # BLD AUTO: 0.05 X10(3) UL (ref 0–0.2)
BASOPHILS NFR BLD AUTO: 0.5 %
BILIRUB SERPL-MCNC: 0.4 MG/DL (ref 0.1–2)
BUN BLD-MCNC: 20 MG/DL (ref 7–18)
CALCIUM BLD-MCNC: 9.1 MG/DL (ref 8.5–10.1)
CHLORIDE SERPL-SCNC: 103 MMOL/L (ref 98–112)
CO2 SERPL-SCNC: 29 MMOL/L (ref 21–32)
CREAT BLD-MCNC: 0.6 MG/DL
CREAT BLD-MCNC: 0.64 MG/DL
CRP SERPL-MCNC: 8 MG/DL (ref ?–0.3)
EOSINOPHIL # BLD AUTO: 1.02 X10(3) UL (ref 0–0.7)
EOSINOPHIL NFR BLD AUTO: 10.6 %
ERYTHROCYTE [DISTWIDTH] IN BLOOD BY AUTOMATED COUNT: 15.6 %
ERYTHROCYTE [SEDIMENTATION RATE] IN BLOOD: 57 MM/HR
FASTING STATUS PATIENT QL REPORTED: NO
GLOBULIN PLAS-MCNC: 3.8 G/DL (ref 2.8–4.4)
GLUCOSE BLD-MCNC: 101 MG/DL (ref 70–99)
HCT VFR BLD AUTO: 32.9 %
HGB BLD-MCNC: 9.9 G/DL
IMM GRANULOCYTES # BLD AUTO: 0.03 X10(3) UL (ref 0–1)
IMM GRANULOCYTES NFR BLD: 0.3 %
LYMPHOCYTES # BLD AUTO: 1.08 X10(3) UL (ref 1–4)
LYMPHOCYTES NFR BLD AUTO: 11.2 %
MCH RBC QN AUTO: 26.5 PG (ref 26–34)
MCHC RBC AUTO-ENTMCNC: 30.1 G/DL (ref 31–37)
MCV RBC AUTO: 88.2 FL
MONOCYTES # BLD AUTO: 1.08 X10(3) UL (ref 0.1–1)
MONOCYTES NFR BLD AUTO: 11.2 %
NEUTROPHILS # BLD AUTO: 6.35 X10 (3) UL (ref 1.5–7.7)
NEUTROPHILS # BLD AUTO: 6.35 X10(3) UL (ref 1.5–7.7)
NEUTROPHILS NFR BLD AUTO: 66.2 %
OSMOLALITY SERPL CALC.SUM OF ELEC: 291 MOSM/KG (ref 275–295)
PLATELET # BLD AUTO: 398 10(3)UL (ref 150–450)
POTASSIUM SERPL-SCNC: 4.4 MMOL/L (ref 3.5–5.1)
PROT SERPL-MCNC: 6.5 G/DL (ref 6.4–8.2)
RBC # BLD AUTO: 3.73 X10(6)UL
SODIUM SERPL-SCNC: 139 MMOL/L (ref 136–145)
WBC # BLD AUTO: 9.6 X10(3) UL (ref 4–11)

## 2022-04-30 PROCEDURE — 80053 COMPREHEN METABOLIC PANEL: CPT

## 2022-04-30 PROCEDURE — 82728 ASSAY OF FERRITIN: CPT

## 2022-04-30 PROCEDURE — 82607 VITAMIN B-12: CPT

## 2022-04-30 PROCEDURE — 82565 ASSAY OF CREATININE: CPT

## 2022-04-30 PROCEDURE — 71260 CT THORAX DX C+: CPT | Performed by: INTERNAL MEDICINE

## 2022-04-30 PROCEDURE — 85652 RBC SED RATE AUTOMATED: CPT

## 2022-04-30 PROCEDURE — 74177 CT ABD & PELVIS W/CONTRAST: CPT | Performed by: INTERNAL MEDICINE

## 2022-04-30 PROCEDURE — 83550 IRON BINDING TEST: CPT

## 2022-04-30 PROCEDURE — 86140 C-REACTIVE PROTEIN: CPT

## 2022-04-30 PROCEDURE — 85025 COMPLETE CBC W/AUTO DIFF WBC: CPT

## 2022-04-30 PROCEDURE — 36415 COLL VENOUS BLD VENIPUNCTURE: CPT

## 2022-04-30 PROCEDURE — 85045 AUTOMATED RETICULOCYTE COUNT: CPT

## 2022-04-30 PROCEDURE — 83540 ASSAY OF IRON: CPT

## 2022-04-30 RX ORDER — IOHEXOL 350 MG/ML
100 INJECTION, SOLUTION INTRAVENOUS
Status: COMPLETED | OUTPATIENT
Start: 2022-04-30 | End: 2022-04-30

## 2022-04-30 RX ADMIN — IOHEXOL 100 ML: 350 INJECTION, SOLUTION INTRAVENOUS at 07:54:00

## 2022-05-01 ENCOUNTER — TELEPHONE (OUTPATIENT)
Dept: RHEUMATOLOGY | Facility: CLINIC | Age: 67
End: 2022-05-01

## 2022-05-01 DIAGNOSIS — C78.1 NON-SMALL CELL LUNG CANCER METASTATIC TO MEDIASTINUM (HCC): ICD-10-CM

## 2022-05-01 DIAGNOSIS — M06.4 INFLAMMATORY POLYARTHRITIS (HCC): Primary | ICD-10-CM

## 2022-05-01 DIAGNOSIS — Z92.89 HISTORY OF IMMUNOTHERAPY: ICD-10-CM

## 2022-05-01 DIAGNOSIS — C34.90 NON-SMALL CELL LUNG CANCER METASTATIC TO MEDIASTINUM (HCC): ICD-10-CM

## 2022-05-01 DIAGNOSIS — D64.9 ANEMIA, UNSPECIFIED TYPE: ICD-10-CM

## 2022-05-01 LAB
DEPRECATED HBV CORE AB SER IA-ACNC: 286.9 NG/ML
HGB RETIC QN AUTO: 29.2 PG (ref 28.2–36.6)
IMM RETICS NFR: 0.14 RATIO (ref 0.1–0.3)
IRON SATN MFR SERPL: 10 %
IRON SERPL-MCNC: 24 UG/DL
RETICS # AUTO: 39.6 X10(3) UL (ref 22.5–147.5)
RETICS/RBC NFR AUTO: 1.1 %
TIBC SERPL-MCNC: 229 UG/DL (ref 240–450)
TRANSFERRIN SERPL-MCNC: 154 MG/DL (ref 200–360)
VIT B12 SERPL-MCNC: 615 PG/ML (ref 193–986)

## 2022-05-06 ENCOUNTER — OFFICE VISIT (OUTPATIENT)
Dept: HEMATOLOGY/ONCOLOGY | Facility: HOSPITAL | Age: 67
End: 2022-05-06
Attending: INTERNAL MEDICINE
Payer: COMMERCIAL

## 2022-05-06 ENCOUNTER — TELEPHONE (OUTPATIENT)
Dept: RHEUMATOLOGY | Facility: CLINIC | Age: 67
End: 2022-05-06

## 2022-05-06 VITALS
TEMPERATURE: 98 F | WEIGHT: 104 LBS | BODY MASS INDEX: 17.75 KG/M2 | SYSTOLIC BLOOD PRESSURE: 105 MMHG | HEIGHT: 64.02 IN | DIASTOLIC BLOOD PRESSURE: 67 MMHG | RESPIRATION RATE: 16 BRPM | OXYGEN SATURATION: 99 % | HEART RATE: 82 BPM

## 2022-05-06 DIAGNOSIS — C34.90 PRIMARY MALIGNANT NEOPLASM OF LUNG METASTATIC TO OTHER SITE, UNSPECIFIED LATERALITY (HCC): Primary | ICD-10-CM

## 2022-05-06 DIAGNOSIS — M06.4 INFLAMMATORY POLYARTHRITIS (HCC): ICD-10-CM

## 2022-05-06 PROCEDURE — 99214 OFFICE O/P EST MOD 30 MIN: CPT | Performed by: INTERNAL MEDICINE

## 2022-05-06 RX ORDER — LEFLUNOMIDE 20 MG/1
20 TABLET ORAL DAILY
Qty: 90 TABLET | Refills: 0 | Status: SHIPPED | OUTPATIENT
Start: 2022-05-06 | End: 2022-06-21

## 2022-05-06 NOTE — PROGRESS NOTES
Outpatient Oncology Care Plan  Problem list:  knowledge deficit    Problems related to:    disease/disease progression    Interventions:  provided general teaching    Expected outcomes:  understands plan of care    Progress towards outcome:  making progress    Education Record    Learner:  Patient and Spouse  Barriers / Limitations:  None  Method:  Brief focused  Outcome:  Shows understanding  Comments:  Pt here for follow up. Pt is done with predisone. Pt states her pain is bad, fevers, fatigue and dizziness.

## 2022-05-09 NOTE — PROGRESS NOTES
Called pt, pt's joint pains still present, worsened after weaning off prednisone. However, she is doing better than prior to her seeing me. She brought up various therapies (orencia, rinvoq, otilimumab), all of these I do not recommend or not commercially available, or have some contracindications.  I recommend considering tocilizumab, but need to get Dr. Lyric Iyer ok first.

## 2022-06-16 ENCOUNTER — SOCIAL WORK SERVICES (OUTPATIENT)
Dept: HEMATOLOGY/ONCOLOGY | Facility: HOSPITAL | Age: 67
End: 2022-06-16

## 2022-06-16 NOTE — PROGRESS NOTES
spoke with patient and completed Disability Form, faxing to [address] at F#[phone], and sending copy to patient via [address]

## 2022-06-21 ENCOUNTER — OFFICE VISIT (OUTPATIENT)
Dept: RHEUMATOLOGY | Facility: CLINIC | Age: 67
End: 2022-06-21
Payer: COMMERCIAL

## 2022-06-21 VITALS
WEIGHT: 105 LBS | BODY MASS INDEX: 17.93 KG/M2 | SYSTOLIC BLOOD PRESSURE: 116 MMHG | RESPIRATION RATE: 16 BRPM | HEIGHT: 64 IN | HEART RATE: 74 BPM | DIASTOLIC BLOOD PRESSURE: 60 MMHG | OXYGEN SATURATION: 99 %

## 2022-06-21 DIAGNOSIS — Z51.81 THERAPEUTIC DRUG MONITORING: ICD-10-CM

## 2022-06-21 DIAGNOSIS — M06.4 INFLAMMATORY POLYARTHRITIS (HCC): Primary | ICD-10-CM

## 2022-06-21 DIAGNOSIS — M47.812 OSTEOARTHRITIS OF CERVICAL SPINE, UNSPECIFIED SPINAL OSTEOARTHRITIS COMPLICATION STATUS: ICD-10-CM

## 2022-06-21 DIAGNOSIS — Z92.89 HISTORY OF IMMUNOTHERAPY: ICD-10-CM

## 2022-06-21 DIAGNOSIS — M19.011 ARTHRITIS OF RIGHT GLENOHUMERAL JOINT: ICD-10-CM

## 2022-06-21 PROCEDURE — 3008F BODY MASS INDEX DOCD: CPT | Performed by: INTERNAL MEDICINE

## 2022-06-21 PROCEDURE — 99214 OFFICE O/P EST MOD 30 MIN: CPT | Performed by: INTERNAL MEDICINE

## 2022-06-21 PROCEDURE — 3078F DIAST BP <80 MM HG: CPT | Performed by: INTERNAL MEDICINE

## 2022-06-21 PROCEDURE — 3074F SYST BP LT 130 MM HG: CPT | Performed by: INTERNAL MEDICINE

## 2022-06-21 RX ORDER — LEFLUNOMIDE 20 MG/1
20 TABLET ORAL DAILY
Qty: 90 TABLET | Refills: 0 | Status: SHIPPED | OUTPATIENT
Start: 2022-07-25

## 2022-06-21 NOTE — PATIENT INSTRUCTIONS
-Repeat blood work including monitoring labs and inflammation markers at next Dr. Suresh Quijano appointment.  -Continue leflunomide 20 mg daily.  -Ibuprofen: Take 600 mg capsule twice daily with food x 7-10 days, then decrease back to 600 mg daily in the morning.

## 2022-07-16 ENCOUNTER — OFFICE VISIT (OUTPATIENT)
Dept: INTERNAL MEDICINE CLINIC | Facility: CLINIC | Age: 67
End: 2022-07-16
Payer: COMMERCIAL

## 2022-07-16 VITALS
DIASTOLIC BLOOD PRESSURE: 70 MMHG | WEIGHT: 107 LBS | SYSTOLIC BLOOD PRESSURE: 104 MMHG | RESPIRATION RATE: 14 BRPM | TEMPERATURE: 99 F | HEIGHT: 64 IN | HEART RATE: 69 BPM | BODY MASS INDEX: 18.27 KG/M2 | OXYGEN SATURATION: 98 %

## 2022-07-16 DIAGNOSIS — R10.13 EPIGASTRIC PAIN: ICD-10-CM

## 2022-07-16 DIAGNOSIS — Z12.11 SCREENING FOR COLON CANCER: ICD-10-CM

## 2022-07-16 DIAGNOSIS — R00.2 PALPITATIONS: ICD-10-CM

## 2022-07-16 DIAGNOSIS — C78.1 NON-SMALL CELL LUNG CANCER METASTATIC TO MEDIASTINUM (HCC): ICD-10-CM

## 2022-07-16 DIAGNOSIS — R64 CACHEXIA (HCC): ICD-10-CM

## 2022-07-16 DIAGNOSIS — Z12.31 BREAST CANCER SCREENING BY MAMMOGRAM: ICD-10-CM

## 2022-07-16 DIAGNOSIS — Z00.00 ROUTINE PHYSICAL EXAMINATION: Primary | ICD-10-CM

## 2022-07-16 DIAGNOSIS — M06.4 INFLAMMATORY POLYARTHRITIS (HCC): ICD-10-CM

## 2022-07-16 DIAGNOSIS — C34.90 NON-SMALL CELL LUNG CANCER METASTATIC TO MEDIASTINUM (HCC): ICD-10-CM

## 2022-07-30 ENCOUNTER — HOSPITAL ENCOUNTER (OUTPATIENT)
Dept: CT IMAGING | Age: 67
Discharge: HOME | End: 2022-07-30
Attending: INTERNAL MEDICINE
Payer: COMMERCIAL

## 2022-07-30 DIAGNOSIS — C34.90 PRIMARY MALIGNANT NEOPLASM OF LUNG METASTATIC TO OTHER SITE, UNSPECIFIED LATERALITY (HCC): ICD-10-CM

## 2022-07-30 LAB — CREAT BLD-MCNC: 0.5 MG/DL

## 2022-07-30 PROCEDURE — 82565 ASSAY OF CREATININE: CPT

## 2022-07-30 PROCEDURE — 74177 CT ABD & PELVIS W/CONTRAST: CPT | Performed by: INTERNAL MEDICINE

## 2022-07-30 PROCEDURE — 71260 CT THORAX DX C+: CPT | Performed by: INTERNAL MEDICINE

## 2022-08-05 ENCOUNTER — OFFICE VISIT (OUTPATIENT)
Dept: HEMATOLOGY/ONCOLOGY | Facility: HOSPITAL | Age: 67
End: 2022-08-05
Attending: INTERNAL MEDICINE
Payer: COMMERCIAL

## 2022-08-05 VITALS
OXYGEN SATURATION: 98 % | HEIGHT: 64.02 IN | HEART RATE: 69 BPM | DIASTOLIC BLOOD PRESSURE: 69 MMHG | SYSTOLIC BLOOD PRESSURE: 104 MMHG | RESPIRATION RATE: 16 BRPM | BODY MASS INDEX: 18.27 KG/M2 | TEMPERATURE: 98 F | WEIGHT: 107 LBS

## 2022-08-05 DIAGNOSIS — M06.4 INFLAMMATORY POLYARTHRITIS (HCC): ICD-10-CM

## 2022-08-05 DIAGNOSIS — C34.90 PRIMARY MALIGNANT NEOPLASM OF LUNG METASTATIC TO OTHER SITE, UNSPECIFIED LATERALITY (HCC): ICD-10-CM

## 2022-08-05 LAB
ALBUMIN SERPL-MCNC: 3 G/DL (ref 3.4–5)
ALBUMIN/GLOB SERPL: 0.8 {RATIO} (ref 1–2)
ALP LIVER SERPL-CCNC: 96 U/L
ALT SERPL-CCNC: 16 U/L
ANION GAP SERPL CALC-SCNC: 5 MMOL/L (ref 0–18)
AST SERPL-CCNC: 18 U/L (ref 15–37)
BASOPHILS # BLD AUTO: 0.06 X10(3) UL (ref 0–0.2)
BASOPHILS NFR BLD AUTO: 0.8 %
BILIRUB SERPL-MCNC: 0.3 MG/DL (ref 0.1–2)
BUN BLD-MCNC: 20 MG/DL (ref 7–18)
CALCIUM BLD-MCNC: 8.7 MG/DL (ref 8.5–10.1)
CHLORIDE SERPL-SCNC: 103 MMOL/L (ref 98–112)
CO2 SERPL-SCNC: 28 MMOL/L (ref 21–32)
CREAT BLD-MCNC: 0.48 MG/DL
CRP SERPL-MCNC: 4.74 MG/DL (ref ?–0.3)
EOSINOPHIL # BLD AUTO: 0.63 X10(3) UL (ref 0–0.7)
EOSINOPHIL NFR BLD AUTO: 8 %
ERYTHROCYTE [DISTWIDTH] IN BLOOD BY AUTOMATED COUNT: 15.4 %
ERYTHROCYTE [SEDIMENTATION RATE] IN BLOOD: 41 MM/HR
GFR SERPLBLD BASED ON 1.73 SQ M-ARVRAT: 106 ML/MIN/1.73M2 (ref 60–?)
GLOBULIN PLAS-MCNC: 4 G/DL (ref 2.8–4.4)
GLUCOSE BLD-MCNC: 89 MG/DL (ref 70–99)
HCT VFR BLD AUTO: 34 %
HGB BLD-MCNC: 10.7 G/DL
IMM GRANULOCYTES # BLD AUTO: 0.01 X10(3) UL (ref 0–1)
IMM GRANULOCYTES NFR BLD: 0.1 %
LYMPHOCYTES # BLD AUTO: 1.65 X10(3) UL (ref 1–4)
LYMPHOCYTES NFR BLD AUTO: 21 %
MCH RBC QN AUTO: 27.4 PG (ref 26–34)
MCHC RBC AUTO-ENTMCNC: 31.5 G/DL (ref 31–37)
MCV RBC AUTO: 87 FL
MONOCYTES # BLD AUTO: 0.87 X10(3) UL (ref 0.1–1)
MONOCYTES NFR BLD AUTO: 11.1 %
NEUTROPHILS # BLD AUTO: 4.63 X10 (3) UL (ref 1.5–7.7)
NEUTROPHILS # BLD AUTO: 4.63 X10(3) UL (ref 1.5–7.7)
NEUTROPHILS NFR BLD AUTO: 59 %
OSMOLALITY SERPL CALC.SUM OF ELEC: 284 MOSM/KG (ref 275–295)
PLATELET # BLD AUTO: 305 10(3)UL (ref 150–450)
POTASSIUM SERPL-SCNC: 4.1 MMOL/L (ref 3.5–5.1)
PROT SERPL-MCNC: 7 G/DL (ref 6.4–8.2)
RBC # BLD AUTO: 3.91 X10(6)UL
SODIUM SERPL-SCNC: 136 MMOL/L (ref 136–145)
WBC # BLD AUTO: 7.9 X10(3) UL (ref 4–11)

## 2022-08-05 PROCEDURE — 99214 OFFICE O/P EST MOD 30 MIN: CPT | Performed by: INTERNAL MEDICINE

## 2022-08-05 NOTE — PROGRESS NOTES
Outpatient Oncology Care Plan  Problem list:  knowledge deficit    Problems related to:    disease/disease progression    Interventions:  provided general teaching    Expected outcomes:  understands plan of care    Progress towards outcome:  making progress    Education Record    Learner:  Patient and Spouse  Barriers / Limitations:  None  Method:  Brief focused  Outcome:  Shows understanding  Comments:  Pt here for follow up. Recent CT. Pt states overall she feels better. She states no fever and skin is better. She complains of fatigue and generalized pain.

## 2022-08-12 ENCOUNTER — TELEPHONE (OUTPATIENT)
Dept: RHEUMATOLOGY | Facility: CLINIC | Age: 67
End: 2022-08-12

## 2022-08-12 DIAGNOSIS — C78.1 NON-SMALL CELL LUNG CANCER METASTATIC TO MEDIASTINUM (HCC): ICD-10-CM

## 2022-08-12 DIAGNOSIS — C34.90 NON-SMALL CELL LUNG CANCER METASTATIC TO MEDIASTINUM (HCC): ICD-10-CM

## 2022-08-12 DIAGNOSIS — Z51.81 THERAPEUTIC DRUG MONITORING: ICD-10-CM

## 2022-08-12 DIAGNOSIS — M06.4 INFLAMMATORY POLYARTHRITIS (HCC): Primary | ICD-10-CM

## 2022-08-12 NOTE — TELEPHONE ENCOUNTER
Called patient, will proceed with PET scan to re-evaluate lung cancer, also to evaluate aortitis. Will cancel other CT when PET is approved.

## 2022-08-22 NOTE — TELEPHONE ENCOUNTER
Called pt, advised to cancel Dr. Felipe Lockett CT and schedule around the same time. Pt voiced understanding.

## 2022-08-22 NOTE — TELEPHONE ENCOUNTER
[address]oned [address] to initiate PA PET scan -- no PA required. Ref#[id number] 8/22/22. Phoned pt to notify no PA required. Would like to clarify with Dr. Morena Morales if she should wait for testing or have completed now--sees oncology in November.

## 2022-09-04 ENCOUNTER — TELEPHONE (OUTPATIENT)
Dept: RHEUMATOLOGY | Facility: CLINIC | Age: 67
End: 2022-09-04

## 2022-09-04 DIAGNOSIS — U07.1 COVID-19: Primary | ICD-10-CM

## 2022-09-04 DIAGNOSIS — D84.9 IMMUNOCOMPROMISED (HCC): ICD-10-CM

## 2022-09-04 RX ORDER — NIRMATRELVIR AND RITONAVIR 300-100 MG
KIT ORAL
Qty: 30 TABLET | Refills: 0 | Status: SHIPPED | OUTPATIENT
Start: 2022-09-04 | End: 2022-09-09

## 2022-09-04 NOTE — TELEPHONE ENCOUNTER
Patient called. Tested positive for COVID today. We will send over Paxlovid given she is immunocompromised and symptoms started yesterday.

## 2022-09-30 ENCOUNTER — TELEPHONE (OUTPATIENT)
Dept: MEDSURG UNIT | Facility: HOSPITAL | Age: 67
End: 2022-09-30

## 2022-09-30 ENCOUNTER — HOSPITAL ENCOUNTER (OUTPATIENT)
Dept: CT IMAGING | Age: 67
Discharge: HOME | End: 2022-09-30
Attending: INTERNAL MEDICINE
Payer: COMMERCIAL

## 2022-09-30 ENCOUNTER — OFFICE VISIT (OUTPATIENT)
Dept: RHEUMATOLOGY | Facility: CLINIC | Age: 67
End: 2022-09-30
Payer: COMMERCIAL

## 2022-09-30 VITALS
RESPIRATION RATE: 16 BRPM | DIASTOLIC BLOOD PRESSURE: 60 MMHG | HEIGHT: 64 IN | TEMPERATURE: 98 F | SYSTOLIC BLOOD PRESSURE: 110 MMHG | BODY MASS INDEX: 18.27 KG/M2 | WEIGHT: 107 LBS | OXYGEN SATURATION: 97 % | HEART RATE: 62 BPM

## 2022-09-30 DIAGNOSIS — Z51.81 THERAPEUTIC DRUG MONITORING: ICD-10-CM

## 2022-09-30 DIAGNOSIS — Z92.89 HISTORY OF IMMUNOTHERAPY: ICD-10-CM

## 2022-09-30 DIAGNOSIS — M06.4 INFLAMMATORY POLYARTHRITIS (HCC): Primary | ICD-10-CM

## 2022-09-30 DIAGNOSIS — R06.00 DYSPNEA, UNSPECIFIED TYPE: ICD-10-CM

## 2022-09-30 DIAGNOSIS — R06.00 DYSPNEA, UNSPECIFIED TYPE: Primary | ICD-10-CM

## 2022-09-30 DIAGNOSIS — R79.82 ELEVATED C-REACTIVE PROTEIN (CRP): ICD-10-CM

## 2022-09-30 DIAGNOSIS — M19.042 OSTEOARTHRITIS OF BOTH HANDS, UNSPECIFIED OSTEOARTHRITIS TYPE: ICD-10-CM

## 2022-09-30 DIAGNOSIS — M19.049 CMC ARTHRITIS: ICD-10-CM

## 2022-09-30 DIAGNOSIS — M19.041 OSTEOARTHRITIS OF BOTH HANDS, UNSPECIFIED OSTEOARTHRITIS TYPE: ICD-10-CM

## 2022-09-30 DIAGNOSIS — M62.838 NECK MUSCLE SPASM: ICD-10-CM

## 2022-09-30 PROCEDURE — 71275 CT ANGIOGRAPHY CHEST: CPT | Performed by: INTERNAL MEDICINE

## 2022-09-30 PROCEDURE — 3078F DIAST BP <80 MM HG: CPT | Performed by: INTERNAL MEDICINE

## 2022-09-30 PROCEDURE — 3008F BODY MASS INDEX DOCD: CPT | Performed by: INTERNAL MEDICINE

## 2022-09-30 PROCEDURE — 3074F SYST BP LT 130 MM HG: CPT | Performed by: INTERNAL MEDICINE

## 2022-09-30 PROCEDURE — 99214 OFFICE O/P EST MOD 30 MIN: CPT | Performed by: INTERNAL MEDICINE

## 2022-09-30 RX ORDER — CYCLOBENZAPRINE HCL 10 MG
TABLET ORAL NIGHTLY
Qty: 14 TABLET | Refills: 0 | Status: SHIPPED | OUTPATIENT
Start: 2022-09-30 | End: 2022-10-14

## 2022-09-30 NOTE — PATIENT INSTRUCTIONS
If your joint pain worsens in 2 weeks, it may be that the leflunomide is out of your system. Recommend restarting the leflunomide at that time. If you have pain again, try a thumb brace (Aia 16 brace), you can get this on [address]. Wear them for one month. Then stop. Try these thumb Alexandra Maldonado) exercises:  https://[employer]. [facility]. ca/Health/aftercareinformation/pages/conditions. aspx?loic=pl3629    Purchase Voltaren (diclofenac) 1% gel over-the-counter. It is in an orange packaging.       ==============================================================================================================      For bone health, especially when on chronic corticosteroids or if you have low bone density (and/or osteoporosis):   1. Goal daily intake is 8951-6872 mg of calcium. Do not exceed this level. 2. Goal daily intake is least 800 international units of vitamin D (though you may need more vitamin D)  3. Here is a link to a guide that shows calcium content in select foods:   [employer]  4. A tablet of Tums has 500 mg of calcium, a cheap source of calcium if you cannot meet this by diet along.

## 2022-10-07 ENCOUNTER — HOSPITAL ENCOUNTER (OUTPATIENT)
Dept: CV DIAGNOSTICS | Facility: HOSPITAL | Age: 67
Discharge: HOME | End: 2022-10-07
Attending: INTERNAL MEDICINE
Payer: COMMERCIAL

## 2022-10-07 DIAGNOSIS — R00.2 PALPITATIONS: ICD-10-CM

## 2022-10-07 PROCEDURE — 93225 XTRNL ECG REC<48 HRS REC: CPT | Performed by: INTERNAL MEDICINE

## 2022-10-07 PROCEDURE — 93226 XTRNL ECG REC<48 HR SCAN A/R: CPT | Performed by: INTERNAL MEDICINE

## 2022-10-07 PROCEDURE — 93227 XTRNL ECG REC<48 HR R&I: CPT | Performed by: INTERNAL MEDICINE

## 2022-10-28 ENCOUNTER — HOSPITAL ENCOUNTER (OUTPATIENT)
Dept: NUCLEAR MEDICINE | Facility: HOSPITAL | Age: 67
Discharge: HOME | End: 2022-10-28
Attending: INTERNAL MEDICINE
Payer: COMMERCIAL

## 2022-10-28 DIAGNOSIS — Z51.81 THERAPEUTIC DRUG MONITORING: ICD-10-CM

## 2022-10-28 DIAGNOSIS — C34.90 NON-SMALL CELL LUNG CANCER METASTATIC TO MEDIASTINUM (HCC): ICD-10-CM

## 2022-10-28 DIAGNOSIS — C78.1 NON-SMALL CELL LUNG CANCER METASTATIC TO MEDIASTINUM (HCC): ICD-10-CM

## 2022-10-28 DIAGNOSIS — M06.4 INFLAMMATORY POLYARTHRITIS (HCC): ICD-10-CM

## 2022-10-28 LAB — GLUCOSE BLD-MCNC: 61 MG/DL (ref 70–99)

## 2022-10-28 PROCEDURE — 78815 PET IMAGE W/CT SKULL-THIGH: CPT | Performed by: INTERNAL MEDICINE

## 2022-10-28 PROCEDURE — 82962 GLUCOSE BLOOD TEST: CPT

## 2022-11-04 ENCOUNTER — OFFICE VISIT (OUTPATIENT)
Dept: HEMATOLOGY/ONCOLOGY | Facility: HOSPITAL | Age: 67
End: 2022-11-04
Attending: INTERNAL MEDICINE
Payer: COMMERCIAL

## 2022-11-04 VITALS
BODY MASS INDEX: 18.44 KG/M2 | WEIGHT: 108 LBS | OXYGEN SATURATION: 98 % | HEIGHT: 64.02 IN | TEMPERATURE: 98 F | HEART RATE: 70 BPM | SYSTOLIC BLOOD PRESSURE: 120 MMHG | RESPIRATION RATE: 16 BRPM | DIASTOLIC BLOOD PRESSURE: 73 MMHG

## 2022-11-04 DIAGNOSIS — M19.90 INFLAMMATORY ARTHRITIS: ICD-10-CM

## 2022-11-04 DIAGNOSIS — C34.90 PRIMARY MALIGNANT NEOPLASM OF LUNG METASTATIC TO OTHER SITE, UNSPECIFIED LATERALITY (HCC): Primary | ICD-10-CM

## 2022-11-04 LAB
ALBUMIN SERPL-MCNC: 3 G/DL (ref 3.4–5)
ALBUMIN/GLOB SERPL: 0.8 {RATIO} (ref 1–2)
ALP LIVER SERPL-CCNC: 90 U/L
ALT SERPL-CCNC: 19 U/L
ANION GAP SERPL CALC-SCNC: 5 MMOL/L (ref 0–18)
AST SERPL-CCNC: 13 U/L (ref 15–37)
BASOPHILS # BLD AUTO: 0.05 X10(3) UL (ref 0–0.2)
BASOPHILS NFR BLD AUTO: 0.7 %
BILIRUB SERPL-MCNC: 0.3 MG/DL (ref 0.1–2)
BUN BLD-MCNC: 19 MG/DL (ref 7–18)
CALCIUM BLD-MCNC: 8.7 MG/DL (ref 8.5–10.1)
CHLORIDE SERPL-SCNC: 101 MMOL/L (ref 98–112)
CO2 SERPL-SCNC: 29 MMOL/L (ref 21–32)
CREAT BLD-MCNC: 0.5 MG/DL
CRP SERPL-MCNC: 2.39 MG/DL (ref ?–0.3)
EOSINOPHIL # BLD AUTO: 0.25 X10(3) UL (ref 0–0.7)
EOSINOPHIL NFR BLD AUTO: 3.6 %
ERYTHROCYTE [DISTWIDTH] IN BLOOD BY AUTOMATED COUNT: 15.1 %
ERYTHROCYTE [SEDIMENTATION RATE] IN BLOOD: 26 MM/HR
GFR SERPLBLD BASED ON 1.73 SQ M-ARVRAT: 105 ML/MIN/1.73M2 (ref 60–?)
GLOBULIN PLAS-MCNC: 3.9 G/DL (ref 2.8–4.4)
GLUCOSE BLD-MCNC: 114 MG/DL (ref 70–99)
HCT VFR BLD AUTO: 34.7 %
HGB BLD-MCNC: 11.1 G/DL
IMM GRANULOCYTES # BLD AUTO: 0.01 X10(3) UL (ref 0–1)
IMM GRANULOCYTES NFR BLD: 0.1 %
LYMPHOCYTES # BLD AUTO: 1.68 X10(3) UL (ref 1–4)
LYMPHOCYTES NFR BLD AUTO: 23.9 %
MCH RBC QN AUTO: 28.8 PG (ref 26–34)
MCHC RBC AUTO-ENTMCNC: 32 G/DL (ref 31–37)
MCV RBC AUTO: 89.9 FL
MONOCYTES # BLD AUTO: 0.76 X10(3) UL (ref 0.1–1)
MONOCYTES NFR BLD AUTO: 10.8 %
NEUTROPHILS # BLD AUTO: 4.27 X10 (3) UL (ref 1.5–7.7)
NEUTROPHILS # BLD AUTO: 4.27 X10(3) UL (ref 1.5–7.7)
NEUTROPHILS NFR BLD AUTO: 60.9 %
OSMOLALITY SERPL CALC.SUM OF ELEC: 283 MOSM/KG (ref 275–295)
PLATELET # BLD AUTO: 285 10(3)UL (ref 150–450)
POTASSIUM SERPL-SCNC: 4.7 MMOL/L (ref 3.5–5.1)
PROT SERPL-MCNC: 6.9 G/DL (ref 6.4–8.2)
RBC # BLD AUTO: 3.86 X10(6)UL
SODIUM SERPL-SCNC: 135 MMOL/L (ref 136–145)
WBC # BLD AUTO: 7 X10(3) UL (ref 4–11)

## 2022-11-04 PROCEDURE — 99214 OFFICE O/P EST MOD 30 MIN: CPT | Performed by: INTERNAL MEDICINE

## 2022-11-04 NOTE — PROGRESS NOTES
Pt here for follow up. Pt continues to have a lot of pain. She  states she gets palpations daily. She saw did a heart monitor. She gets occasional dizziness.       Outpatient Oncology Care Plan  Problem list:  knowledge deficit    Problems related to:    disease/disease progression    Interventions:  provided general teaching    Expected outcomes:  understands plan of care    Progress towards outcome:  making progress    Education Record    Learner:  Patient and Spouse  Barriers / Limitations:  None  Method:  Brief focused  Outcome:  Shows understanding  Comments:

## 2022-11-18 ENCOUNTER — TELEPHONE (OUTPATIENT)
Dept: RHEUMATOLOGY | Facility: CLINIC | Age: 67
End: 2022-11-18

## 2022-11-18 DIAGNOSIS — M06.4 INFLAMMATORY POLYARTHRITIS (HCC): Primary | ICD-10-CM

## 2022-11-18 RX ORDER — NAPROXEN 500 MG/1
500 TABLET ORAL 2 TIMES DAILY WITH MEALS
Qty: 60 TABLET | Refills: 0 | Status: SHIPPED | OUTPATIENT
Start: 2022-11-18 | End: 2022-12-18

## 2023-01-11 DIAGNOSIS — M06.4 INFLAMMATORY POLYARTHRITIS (HCC): ICD-10-CM

## 2023-01-12 RX ORDER — LEFLUNOMIDE 20 MG/1
TABLET ORAL
Qty: 90 TABLET | Refills: 0 | Status: SHIPPED | OUTPATIENT
Start: 2023-01-12

## 2023-01-12 NOTE — TELEPHONE ENCOUNTER
Future Appointments   Date Time Provider Arlin Layla   1/27/2023  9:00 AM Pita Briscoe MD [facility]   1/28/2023  9:15 AM BBK CT 1 [address] [place]   2/3/2023 11:15 AM Jessica Cuenca MD [email or web address]     Last office visit: 9/30/2022    Last fill: 7/25/2022 90 tab, 0 refills

## 2023-02-03 ENCOUNTER — APPOINTMENT (OUTPATIENT)
Dept: HEMATOLOGY/ONCOLOGY | Facility: HOSPITAL | Age: 68
End: 2023-02-03
Attending: INTERNAL MEDICINE
Payer: COMMERCIAL

## 2023-02-04 ENCOUNTER — HOSPITAL ENCOUNTER (OUTPATIENT)
Dept: CT IMAGING | Age: 68
Discharge: HOME | End: 2023-02-04
Attending: INTERNAL MEDICINE
Payer: COMMERCIAL

## 2023-02-04 DIAGNOSIS — C34.90 PRIMARY MALIGNANT NEOPLASM OF LUNG METASTATIC TO OTHER SITE, UNSPECIFIED LATERALITY (HCC): ICD-10-CM

## 2023-02-04 LAB
CREAT BLD-MCNC: 0.5 MG/DL
GFR SERPLBLD BASED ON 1.73 SQ M-ARVRAT: 104 ML/MIN/1.73M2 (ref 60–?)

## 2023-02-04 PROCEDURE — 71260 CT THORAX DX C+: CPT | Performed by: INTERNAL MEDICINE

## 2023-02-04 PROCEDURE — 74177 CT ABD & PELVIS W/CONTRAST: CPT | Performed by: INTERNAL MEDICINE

## 2023-02-04 PROCEDURE — 82565 ASSAY OF CREATININE: CPT

## 2023-02-13 ENCOUNTER — OFFICE VISIT (OUTPATIENT)
Dept: HEMATOLOGY/ONCOLOGY | Facility: HOSPITAL | Age: 68
End: 2023-02-13
Attending: INTERNAL MEDICINE
Payer: COMMERCIAL

## 2023-02-13 VITALS
RESPIRATION RATE: 18 BRPM | SYSTOLIC BLOOD PRESSURE: 113 MMHG | BODY MASS INDEX: 18.52 KG/M2 | HEIGHT: 64.02 IN | TEMPERATURE: 99 F | DIASTOLIC BLOOD PRESSURE: 68 MMHG | HEART RATE: 90 BPM | WEIGHT: 108.5 LBS | OXYGEN SATURATION: 94 %

## 2023-02-13 DIAGNOSIS — M19.90 INFLAMMATORY ARTHRITIS: ICD-10-CM

## 2023-02-13 DIAGNOSIS — C34.90 PRIMARY MALIGNANT NEOPLASM OF LUNG METASTATIC TO OTHER SITE, UNSPECIFIED LATERALITY (HCC): ICD-10-CM

## 2023-02-13 LAB
ALBUMIN SERPL-MCNC: 3 G/DL (ref 3.4–5)
ALBUMIN/GLOB SERPL: 0.7 {RATIO} (ref 1–2)
ALP LIVER SERPL-CCNC: 109 U/L
ALT SERPL-CCNC: 19 U/L
ANION GAP SERPL CALC-SCNC: 2 MMOL/L (ref 0–18)
AST SERPL-CCNC: 15 U/L (ref 15–37)
BASOPHILS # BLD AUTO: 0.06 X10(3) UL (ref 0–0.2)
BASOPHILS NFR BLD AUTO: 0.8 %
BILIRUB SERPL-MCNC: 0.2 MG/DL (ref 0.1–2)
BUN BLD-MCNC: 19 MG/DL (ref 7–18)
CALCIUM BLD-MCNC: 8.9 MG/DL (ref 8.5–10.1)
CHLORIDE SERPL-SCNC: 109 MMOL/L (ref 98–112)
CO2 SERPL-SCNC: 29 MMOL/L (ref 21–32)
CREAT BLD-MCNC: 0.44 MG/DL
CRP SERPL-MCNC: 2.51 MG/DL (ref ?–0.3)
EOSINOPHIL # BLD AUTO: 0.71 X10(3) UL (ref 0–0.7)
EOSINOPHIL NFR BLD AUTO: 9.1 %
ERYTHROCYTE [DISTWIDTH] IN BLOOD BY AUTOMATED COUNT: 15.2 %
ERYTHROCYTE [SEDIMENTATION RATE] IN BLOOD: 39 MM/HR
GFR SERPLBLD BASED ON 1.73 SQ M-ARVRAT: 107 ML/MIN/1.73M2 (ref 60–?)
GLOBULIN PLAS-MCNC: 4.1 G/DL (ref 2.8–4.4)
GLUCOSE BLD-MCNC: 114 MG/DL (ref 70–99)
HCT VFR BLD AUTO: 35.5 %
HGB BLD-MCNC: 11.2 G/DL
IMM GRANULOCYTES # BLD AUTO: 0.02 X10(3) UL (ref 0–1)
IMM GRANULOCYTES NFR BLD: 0.3 %
LYMPHOCYTES # BLD AUTO: 1.68 X10(3) UL (ref 1–4)
LYMPHOCYTES NFR BLD AUTO: 21.4 %
MCH RBC QN AUTO: 28.6 PG (ref 26–34)
MCHC RBC AUTO-ENTMCNC: 31.5 G/DL (ref 31–37)
MCV RBC AUTO: 90.8 FL
MONOCYTES # BLD AUTO: 0.7 X10(3) UL (ref 0.1–1)
MONOCYTES NFR BLD AUTO: 8.9 %
NEUTROPHILS # BLD AUTO: 4.67 X10 (3) UL (ref 1.5–7.7)
NEUTROPHILS # BLD AUTO: 4.67 X10(3) UL (ref 1.5–7.7)
NEUTROPHILS NFR BLD AUTO: 59.5 %
OSMOLALITY SERPL CALC.SUM OF ELEC: 293 MOSM/KG (ref 275–295)
PLATELET # BLD AUTO: 308 10(3)UL (ref 150–450)
POTASSIUM SERPL-SCNC: 4.3 MMOL/L (ref 3.5–5.1)
PROT SERPL-MCNC: 7.1 G/DL (ref 6.4–8.2)
RBC # BLD AUTO: 3.91 X10(6)UL
SODIUM SERPL-SCNC: 140 MMOL/L (ref 136–145)
WBC # BLD AUTO: 7.8 X10(3) UL (ref 4–11)

## 2023-02-13 PROCEDURE — 99214 OFFICE O/P EST MOD 30 MIN: CPT | Performed by: INTERNAL MEDICINE

## 2023-02-13 NOTE — PROGRESS NOTES
Pt here for follow up. She states some days she feels good and other days not good. -- tired, dizzy.     Outpatient Oncology Care Plan  Problem list:  knowledge deficit    Problems related to:    disease/disease progression    Interventions:  provided general teaching    Expected outcomes:  understands plan of care    Progress towards outcome:  making progress    Education Record    Learner:  Patient and Spouse  Barriers / Limitations:  None  Method:  Brief focused  Outcome:  Shows understanding  Comments:

## 2023-03-03 ENCOUNTER — OFFICE VISIT (OUTPATIENT)
Dept: RHEUMATOLOGY | Facility: CLINIC | Age: 68
End: 2023-03-03
Payer: COMMERCIAL

## 2023-03-03 VITALS
RESPIRATION RATE: 16 BRPM | WEIGHT: 109.38 LBS | BODY MASS INDEX: 18.67 KG/M2 | HEIGHT: 64 IN | HEART RATE: 79 BPM | DIASTOLIC BLOOD PRESSURE: 60 MMHG | TEMPERATURE: 97 F | OXYGEN SATURATION: 94 % | SYSTOLIC BLOOD PRESSURE: 112 MMHG

## 2023-03-03 DIAGNOSIS — M25.461 EFFUSION OF RIGHT KNEE: Primary | ICD-10-CM

## 2023-03-03 DIAGNOSIS — M06.00 SERONEGATIVE RHEUMATOID ARTHRITIS (HCC): ICD-10-CM

## 2023-03-03 DIAGNOSIS — M06.4 INFLAMMATORY POLYARTHRITIS (HCC): ICD-10-CM

## 2023-03-03 LAB
BASOPHILS NFR SNV: 0 %
COLOR FLD: YELLOW
EOSINOPHIL NFR SNV: 0 %
LYMPHOCYTES NFR SNV: 8 %
MONOS+MACROS NFR SNV: 4 %
NEUTROPHILS NFR FLD: 88 %
RBC # FLD AUTO: 1740 /CUMM (ref ?–1)
TOTAL CELLS COUNTED FLD: 100
TOTAL CELLS COUNTED SNV: ABNORMAL /CUMM (ref 0–200)
WBC # SNV: NORMAL /CUMM

## 2023-03-03 PROCEDURE — 87070 CULTURE OTHR SPECIMN AEROBIC: CPT | Performed by: INTERNAL MEDICINE

## 2023-03-03 PROCEDURE — 89051 BODY FLUID CELL COUNT: CPT | Performed by: INTERNAL MEDICINE

## 2023-03-03 PROCEDURE — 20610 DRAIN/INJ JOINT/BURSA W/O US: CPT | Performed by: INTERNAL MEDICINE

## 2023-03-03 PROCEDURE — 89060 EXAM SYNOVIAL FLUID CRYSTALS: CPT | Performed by: INTERNAL MEDICINE

## 2023-03-03 PROCEDURE — 3008F BODY MASS INDEX DOCD: CPT | Performed by: INTERNAL MEDICINE

## 2023-03-03 PROCEDURE — 89050 BODY FLUID CELL COUNT: CPT | Performed by: INTERNAL MEDICINE

## 2023-03-03 PROCEDURE — 3078F DIAST BP <80 MM HG: CPT | Performed by: INTERNAL MEDICINE

## 2023-03-03 PROCEDURE — 87205 SMEAR GRAM STAIN: CPT | Performed by: INTERNAL MEDICINE

## 2023-03-03 PROCEDURE — 3074F SYST BP LT 130 MM HG: CPT | Performed by: INTERNAL MEDICINE

## 2023-03-03 PROCEDURE — 99214 OFFICE O/P EST MOD 30 MIN: CPT | Performed by: INTERNAL MEDICINE

## 2023-03-03 RX ORDER — DULOXETIN HYDROCHLORIDE 30 MG/1
CAPSULE, DELAYED RELEASE ORAL
Qty: 150 CAPSULE | Refills: 1 | Status: SHIPPED | OUTPATIENT
Start: 2023-03-03 | End: 2023-06-01

## 2023-03-03 RX ORDER — LEFLUNOMIDE 20 MG/1
20 TABLET ORAL DAILY
Qty: 90 TABLET | Refills: 0 | Status: SHIPPED | OUTPATIENT
Start: 2023-03-23

## 2023-03-03 NOTE — PROGRESS NOTES
Procedure Note: right knee aspiration. The risks, benefits, and alternatives of the procedure were explained, and verbal consent was obtained. Area over superolateral aspect of knee was prepped with chlorhexidine 2% x 2 and then 70% isopropyl alcohol stick swab x 3   and numbed with ethyl choloride spray. 25-gauge needle was inserted, 2.5 cc of lidocaine was injected in subcutaneous space. Area was recleansed with then 70% isopropyl alcohol stick swab x 3 and 18 g needle inserted and 55 cc of inflammatory, slightly opaque yellow fluid was aspirated. Patient tolerated procedure well without any immediate complications.

## 2023-03-03 NOTE — PATIENT INSTRUCTIONS
-continue leflunomide 20 mg daily  -repeat blood work in late May 2023. Start cymbalta 30 mg daily x 2 weeks, then 60 mg daily. I prescribed it to your pharmacy. See the reading below. Sometimes the medication will give you more energy to complete acts, including suicide, in the first month, this risk goes down after the first month. Be very careful of this. If you have these thoughts, then stop the medication.       ==============================================================================================================    Post Injection Instructions    1. The injected area may feel more painful again after the anesthetic wears off approximately 2 to 3 hours after the injection. Also, the steroid may not begin to work for 3 to 5 days. 2.  Please avoid any strenuous activity or exercise using the area that was injected for a period of at least 3 to 5 days. 3.  Please apply ice to the area of injection for 10 to 15 minutes when you get home. If needed, ice can be used every 4 hours as needed for 1 to 2 days after the injection. 4.  Please monitor the injected site carefully for signs of an infection. Please report to the emergency room with development of redness, swelling, severe pain, fever, chills, and/or drainage of pus from the injection site. 5.  If you are diabetic, steroid injection will likely increase your blood sugar level. Please monitor your blood sugar more frequently to make appropriate adjustments in your medication and let your primary care physician and/or diabetes doctor know to help manage it.

## 2023-03-04 ENCOUNTER — TELEPHONE (OUTPATIENT)
Dept: RHEUMATOLOGY | Facility: CLINIC | Age: 68
End: 2023-03-04

## 2023-03-04 DIAGNOSIS — M25.461 EFFUSION OF RIGHT KNEE: Primary | ICD-10-CM

## 2023-03-04 DIAGNOSIS — M06.4 INFLAMMATORY POLYARTHRITIS (HCC): ICD-10-CM

## 2023-03-04 RX ORDER — PREDNISONE 1 MG/1
15 TABLET ORAL DAILY
Qty: 42 TABLET | Refills: 0 | Status: SHIPPED | OUTPATIENT
Start: 2023-03-04 | End: 2023-03-18

## 2023-04-24 ENCOUNTER — TELEPHONE (OUTPATIENT)
Dept: RHEUMATOLOGY | Facility: CLINIC | Age: 68
End: 2023-04-24

## 2023-04-24 DIAGNOSIS — M06.4 INFLAMMATORY POLYARTHRITIS (HCC): Primary | ICD-10-CM

## 2023-04-24 RX ORDER — TRAMADOL HYDROCHLORIDE 50 MG/1
50 TABLET ORAL EVERY 12 HOURS PRN
Qty: 20 TABLET | Refills: 0 | Status: SHIPPED | OUTPATIENT
Start: 2023-04-24

## 2023-05-13 ENCOUNTER — TELEPHONE (OUTPATIENT)
Dept: INTERNAL MEDICINE CLINIC | Facility: CLINIC | Age: 68
End: 2023-05-13

## 2023-05-13 ENCOUNTER — OFFICE VISIT (OUTPATIENT)
Dept: INTERNAL MEDICINE CLINIC | Facility: CLINIC | Age: 68
End: 2023-05-13
Payer: COMMERCIAL

## 2023-05-13 ENCOUNTER — LAB ENCOUNTER (OUTPATIENT)
Dept: LAB | Age: 68
End: 2023-05-13
Attending: INTERNAL MEDICINE
Payer: COMMERCIAL

## 2023-05-13 VITALS
HEIGHT: 62.5 IN | OXYGEN SATURATION: 97 % | RESPIRATION RATE: 12 BRPM | BODY MASS INDEX: 19.59 KG/M2 | WEIGHT: 109.19 LBS | HEART RATE: 76 BPM | SYSTOLIC BLOOD PRESSURE: 112 MMHG | TEMPERATURE: 98 F | DIASTOLIC BLOOD PRESSURE: 64 MMHG

## 2023-05-13 DIAGNOSIS — C34.90 NON-SMALL CELL LUNG CANCER METASTATIC TO MEDIASTINUM (HCC): ICD-10-CM

## 2023-05-13 DIAGNOSIS — M06.4 INFLAMMATORY POLYARTHRITIS (HCC): ICD-10-CM

## 2023-05-13 DIAGNOSIS — M25.461 EFFUSION OF RIGHT KNEE: ICD-10-CM

## 2023-05-13 DIAGNOSIS — E78.2 MIXED HYPERLIPIDEMIA: ICD-10-CM

## 2023-05-13 DIAGNOSIS — C78.1 NON-SMALL CELL LUNG CANCER METASTATIC TO MEDIASTINUM (HCC): ICD-10-CM

## 2023-05-13 DIAGNOSIS — Z01.818 PREOP EXAM FOR INTERNAL MEDICINE: Primary | ICD-10-CM

## 2023-05-13 DIAGNOSIS — M06.00 SERONEGATIVE RHEUMATOID ARTHRITIS (HCC): ICD-10-CM

## 2023-05-13 PROBLEM — M19.011 ARTHRITIS OF RIGHT GLENOHUMERAL JOINT: Status: RESOLVED | Noted: 2022-01-22 | Resolved: 2023-05-13

## 2023-05-13 PROBLEM — J93.9 PNEUMOTHORAX: Status: RESOLVED | Noted: 2020-10-06 | Resolved: 2023-05-13

## 2023-05-13 PROBLEM — Z87.09 HISTORY OF PNEUMOTHORAX: Status: ACTIVE | Noted: 2023-05-13

## 2023-05-13 LAB
ALBUMIN SERPL-MCNC: 3.2 G/DL (ref 3.4–5)
ALBUMIN/GLOB SERPL: 0.9 {RATIO} (ref 1–2)
ALP LIVER SERPL-CCNC: 104 U/L
ALT SERPL-CCNC: 23 U/L
ANION GAP SERPL CALC-SCNC: <0 MMOL/L (ref 0–18)
AST SERPL-CCNC: 24 U/L (ref 15–37)
BASOPHILS # BLD AUTO: 0.06 X10(3) UL (ref 0–0.2)
BASOPHILS NFR BLD AUTO: 0.8 %
BILIRUB SERPL-MCNC: 0.2 MG/DL (ref 0.1–2)
BUN BLD-MCNC: 21 MG/DL (ref 7–18)
CALCIUM BLD-MCNC: 9.3 MG/DL (ref 8.5–10.1)
CHLORIDE SERPL-SCNC: 111 MMOL/L (ref 98–112)
CO2 SERPL-SCNC: 29 MMOL/L (ref 21–32)
CREAT BLD-MCNC: 0.56 MG/DL
CRP SERPL-MCNC: 2.71 MG/DL (ref ?–0.3)
EOSINOPHIL # BLD AUTO: 0.27 X10(3) UL (ref 0–0.7)
EOSINOPHIL NFR BLD AUTO: 3.4 %
ERYTHROCYTE [DISTWIDTH] IN BLOOD BY AUTOMATED COUNT: 14.1 %
ERYTHROCYTE [SEDIMENTATION RATE] IN BLOOD: 16 MM/HR
FASTING STATUS PATIENT QL REPORTED: NO
GFR SERPLBLD BASED ON 1.73 SQ M-ARVRAT: 101 ML/MIN/1.73M2 (ref 60–?)
GLOBULIN PLAS-MCNC: 3.7 G/DL (ref 2.8–4.4)
GLUCOSE BLD-MCNC: 82 MG/DL (ref 70–99)
HCT VFR BLD AUTO: 39.4 %
HGB BLD-MCNC: 12.1 G/DL
IMM GRANULOCYTES # BLD AUTO: 0.01 X10(3) UL (ref 0–1)
IMM GRANULOCYTES NFR BLD: 0.1 %
LYMPHOCYTES # BLD AUTO: 1.15 X10(3) UL (ref 1–4)
LYMPHOCYTES NFR BLD AUTO: 14.6 %
MCH RBC QN AUTO: 28.5 PG (ref 26–34)
MCHC RBC AUTO-ENTMCNC: 30.7 G/DL (ref 31–37)
MCV RBC AUTO: 92.9 FL
MONOCYTES # BLD AUTO: 0.77 X10(3) UL (ref 0.1–1)
MONOCYTES NFR BLD AUTO: 9.7 %
NEUTROPHILS # BLD AUTO: 5.64 X10 (3) UL (ref 1.5–7.7)
NEUTROPHILS # BLD AUTO: 5.64 X10(3) UL (ref 1.5–7.7)
NEUTROPHILS NFR BLD AUTO: 71.4 %
OSMOLALITY SERPL CALC.SUM OF ELEC: 290 MOSM/KG (ref 275–295)
PLATELET # BLD AUTO: 283 10(3)UL (ref 150–450)
POTASSIUM SERPL-SCNC: 4.6 MMOL/L (ref 3.5–5.1)
PROT SERPL-MCNC: 6.9 G/DL (ref 6.4–8.2)
RBC # BLD AUTO: 4.24 X10(6)UL
SODIUM SERPL-SCNC: 139 MMOL/L (ref 136–145)
WBC # BLD AUTO: 7.9 X10(3) UL (ref 4–11)

## 2023-05-13 PROCEDURE — 36415 COLL VENOUS BLD VENIPUNCTURE: CPT

## 2023-05-13 PROCEDURE — 80053 COMPREHEN METABOLIC PANEL: CPT

## 2023-05-13 PROCEDURE — 99244 OFF/OP CNSLTJ NEW/EST MOD 40: CPT | Performed by: INTERNAL MEDICINE

## 2023-05-13 PROCEDURE — 3008F BODY MASS INDEX DOCD: CPT | Performed by: INTERNAL MEDICINE

## 2023-05-13 PROCEDURE — 85025 COMPLETE CBC W/AUTO DIFF WBC: CPT

## 2023-05-13 PROCEDURE — 85652 RBC SED RATE AUTOMATED: CPT

## 2023-05-13 PROCEDURE — 86140 C-REACTIVE PROTEIN: CPT

## 2023-05-13 PROCEDURE — 3074F SYST BP LT 130 MM HG: CPT | Performed by: INTERNAL MEDICINE

## 2023-05-13 PROCEDURE — 3078F DIAST BP <80 MM HG: CPT | Performed by: INTERNAL MEDICINE

## 2023-05-13 RX ORDER — OFLOXACIN 3 MG/ML
SOLUTION/ DROPS OPHTHALMIC
COMMUNITY
Start: 2023-04-28

## 2023-05-13 RX ORDER — KETOROLAC TROMETHAMINE 5 MG/ML
SOLUTION OPHTHALMIC
COMMUNITY
Start: 2023-04-28

## 2023-05-13 RX ORDER — PREDNISOLONE ACETATE 10 MG/ML
SUSPENSION/ DROPS OPHTHALMIC
COMMUNITY
Start: 2023-04-28

## 2023-05-13 NOTE — TELEPHONE ENCOUNTER
Can you please find out if Dr. Chavez sheehan's office needs any further testing for the preop. We did not receive any fax.  If not, okay to fax my note    Ilya Mason DO

## 2023-05-15 NOTE — TELEPHONE ENCOUNTER
Patel Jo from [facility] called back stating there is no further testing needed for pre-op.     Faxed OV note

## 2023-06-29 ENCOUNTER — OFFICE VISIT (OUTPATIENT)
Dept: RHEUMATOLOGY | Facility: CLINIC | Age: 68
End: 2023-06-29
Payer: COMMERCIAL

## 2023-06-29 VITALS
SYSTOLIC BLOOD PRESSURE: 104 MMHG | DIASTOLIC BLOOD PRESSURE: 60 MMHG | RESPIRATION RATE: 16 BRPM | TEMPERATURE: 99 F | OXYGEN SATURATION: 96 % | HEIGHT: 64 IN | WEIGHT: 111 LBS | HEART RATE: 68 BPM | BODY MASS INDEX: 18.95 KG/M2

## 2023-06-29 DIAGNOSIS — Z51.81 THERAPEUTIC DRUG MONITORING: ICD-10-CM

## 2023-06-29 DIAGNOSIS — Z92.89 HISTORY OF IMMUNOTHERAPY: ICD-10-CM

## 2023-06-29 DIAGNOSIS — M06.4 INFLAMMATORY POLYARTHRITIS (HCC): ICD-10-CM

## 2023-06-29 DIAGNOSIS — M06.00 SERONEGATIVE RHEUMATOID ARTHRITIS (HCC): Primary | ICD-10-CM

## 2023-06-29 DIAGNOSIS — M81.0 AGE-RELATED OSTEOPOROSIS WITHOUT CURRENT PATHOLOGICAL FRACTURE: ICD-10-CM

## 2023-06-29 DIAGNOSIS — M19.012 ARTHRITIS OF LEFT GLENOHUMERAL JOINT: ICD-10-CM

## 2023-06-29 PROCEDURE — 99214 OFFICE O/P EST MOD 30 MIN: CPT | Performed by: INTERNAL MEDICINE

## 2023-06-29 PROCEDURE — 3078F DIAST BP <80 MM HG: CPT | Performed by: INTERNAL MEDICINE

## 2023-06-29 PROCEDURE — 3008F BODY MASS INDEX DOCD: CPT | Performed by: INTERNAL MEDICINE

## 2023-06-29 PROCEDURE — 3074F SYST BP LT 130 MM HG: CPT | Performed by: INTERNAL MEDICINE

## 2023-08-12 ENCOUNTER — HOSPITAL ENCOUNTER (OUTPATIENT)
Dept: CT IMAGING | Age: 68
Discharge: HOME | End: 2023-08-12
Attending: INTERNAL MEDICINE
Payer: COMMERCIAL

## 2023-08-12 DIAGNOSIS — M19.90 INFLAMMATORY ARTHRITIS: ICD-10-CM

## 2023-08-12 DIAGNOSIS — C34.90 PRIMARY MALIGNANT NEOPLASM OF LUNG METASTATIC TO OTHER SITE, UNSPECIFIED LATERALITY (HCC): ICD-10-CM

## 2023-08-12 LAB
CREAT BLD-MCNC: 0.6 MG/DL
EGFRCR SERPLBLD CKD-EPI 2021: 100 ML/MIN/1.73M2 (ref 60–?)

## 2023-08-12 PROCEDURE — 71260 CT THORAX DX C+: CPT | Performed by: INTERNAL MEDICINE

## 2023-08-12 PROCEDURE — 82565 ASSAY OF CREATININE: CPT

## 2023-08-12 PROCEDURE — 74177 CT ABD & PELVIS W/CONTRAST: CPT | Performed by: INTERNAL MEDICINE

## 2023-08-18 ENCOUNTER — OFFICE VISIT (OUTPATIENT)
Dept: HEMATOLOGY/ONCOLOGY | Facility: HOSPITAL | Age: 68
End: 2023-08-18
Attending: INTERNAL MEDICINE
Payer: COMMERCIAL

## 2023-08-18 VITALS
SYSTOLIC BLOOD PRESSURE: 99 MMHG | TEMPERATURE: 98 F | HEART RATE: 67 BPM | BODY MASS INDEX: 18.54 KG/M2 | RESPIRATION RATE: 16 BRPM | DIASTOLIC BLOOD PRESSURE: 65 MMHG | WEIGHT: 108.63 LBS | OXYGEN SATURATION: 99 % | HEIGHT: 64.02 IN

## 2023-08-18 DIAGNOSIS — C34.90 PRIMARY MALIGNANT NEOPLASM OF LUNG METASTATIC TO OTHER SITE, UNSPECIFIED LATERALITY (HCC): ICD-10-CM

## 2023-08-18 DIAGNOSIS — M19.90 INFLAMMATORY ARTHRITIS: ICD-10-CM

## 2023-08-18 LAB
ALBUMIN SERPL-MCNC: 3.2 G/DL (ref 3.4–5)
ALBUMIN/GLOB SERPL: 0.9 {RATIO} (ref 1–2)
ALP LIVER SERPL-CCNC: 110 U/L
ALT SERPL-CCNC: 19 U/L
ANION GAP SERPL CALC-SCNC: <0 MMOL/L (ref 0–18)
AST SERPL-CCNC: 13 U/L (ref 15–37)
BASOPHILS # BLD AUTO: 0.03 X10(3) UL (ref 0–0.2)
BASOPHILS NFR BLD AUTO: 0.4 %
BILIRUB SERPL-MCNC: 0.3 MG/DL (ref 0.1–2)
BUN BLD-MCNC: 12 MG/DL (ref 7–18)
CALCIUM BLD-MCNC: 8.9 MG/DL (ref 8.5–10.1)
CHLORIDE SERPL-SCNC: 106 MMOL/L (ref 98–112)
CO2 SERPL-SCNC: 29 MMOL/L (ref 21–32)
CREAT BLD-MCNC: 0.58 MG/DL
CRP SERPL-MCNC: 2.1 MG/DL (ref ?–0.3)
EGFRCR SERPLBLD CKD-EPI 2021: 100 ML/MIN/1.73M2 (ref 60–?)
EOSINOPHIL # BLD AUTO: 0.13 X10(3) UL (ref 0–0.7)
EOSINOPHIL NFR BLD AUTO: 1.7 %
ERYTHROCYTE [DISTWIDTH] IN BLOOD BY AUTOMATED COUNT: 14.1 %
ERYTHROCYTE [SEDIMENTATION RATE] IN BLOOD: 43 MM/HR
FASTING STATUS PATIENT QL REPORTED: NO
GLOBULIN PLAS-MCNC: 3.6 G/DL (ref 2.8–4.4)
GLUCOSE BLD-MCNC: 125 MG/DL (ref 70–99)
HCT VFR BLD AUTO: 35.8 %
HGB BLD-MCNC: 11.8 G/DL
IMM GRANULOCYTES # BLD AUTO: 0.02 X10(3) UL (ref 0–1)
IMM GRANULOCYTES NFR BLD: 0.3 %
LYMPHOCYTES # BLD AUTO: 1.57 X10(3) UL (ref 1–4)
LYMPHOCYTES NFR BLD AUTO: 21.1 %
MCH RBC QN AUTO: 29.4 PG (ref 26–34)
MCHC RBC AUTO-ENTMCNC: 33 G/DL (ref 31–37)
MCV RBC AUTO: 89.3 FL
MONOCYTES # BLD AUTO: 0.67 X10(3) UL (ref 0.1–1)
MONOCYTES NFR BLD AUTO: 9 %
NEUTROPHILS # BLD AUTO: 5.03 X10 (3) UL (ref 1.5–7.7)
NEUTROPHILS # BLD AUTO: 5.03 X10(3) UL (ref 1.5–7.7)
NEUTROPHILS NFR BLD AUTO: 67.5 %
OSMOLALITY SERPL CALC.SUM OF ELEC: 279 MOSM/KG (ref 275–295)
PLATELET # BLD AUTO: 284 10(3)UL (ref 150–450)
POTASSIUM SERPL-SCNC: 5.1 MMOL/L (ref 3.5–5.1)
PROT SERPL-MCNC: 6.8 G/DL (ref 6.4–8.2)
RBC # BLD AUTO: 4.01 X10(6)UL
SODIUM SERPL-SCNC: 134 MMOL/L (ref 136–145)
WBC # BLD AUTO: 7.5 X10(3) UL (ref 4–11)

## 2023-08-18 PROCEDURE — 99214 OFFICE O/P EST MOD 30 MIN: CPT | Performed by: INTERNAL MEDICINE

## 2023-08-18 NOTE — PROGRESS NOTES
Pt here for follow up. She states she has good days and bad days.       Outpatient Oncology Care Plan  Problem list:  knowledge deficit    Problems related to:    disease/disease progression    Interventions:  provided general teaching    Expected outcomes:  understands plan of care    Progress towards outcome:  making progress    Education Record    Learner:  Patient and Spouse  Barriers / Limitations:  None  Method:  Brief focused  Outcome:  Shows understanding  Comments:

## 2023-09-20 ENCOUNTER — TELEPHONE (OUTPATIENT)
Dept: RHEUMATOLOGY | Facility: CLINIC | Age: 68
End: 2023-09-20

## 2023-09-20 DIAGNOSIS — M06.00 SERONEGATIVE RHEUMATOID ARTHRITIS (HCC): Primary | ICD-10-CM

## 2023-09-20 DIAGNOSIS — M06.4 INFLAMMATORY POLYARTHRITIS (HCC): ICD-10-CM

## 2023-09-20 DIAGNOSIS — Z51.81 THERAPEUTIC DRUG MONITORING: ICD-10-CM

## 2023-10-16 ENCOUNTER — LAB ENCOUNTER (OUTPATIENT)
Dept: LAB | Age: 68
End: 2023-10-16
Attending: INTERNAL MEDICINE
Payer: COMMERCIAL

## 2023-10-16 DIAGNOSIS — Z51.81 THERAPEUTIC DRUG MONITORING: ICD-10-CM

## 2023-10-16 DIAGNOSIS — M06.00 SERONEGATIVE RHEUMATOID ARTHRITIS (HCC): ICD-10-CM

## 2023-10-16 DIAGNOSIS — M06.4 INFLAMMATORY POLYARTHRITIS (HCC): ICD-10-CM

## 2023-10-16 LAB
ALBUMIN SERPL-MCNC: 3.2 G/DL (ref 3.4–5)
ALBUMIN/GLOB SERPL: 0.9 {RATIO} (ref 1–2)
ALP LIVER SERPL-CCNC: 113 U/L
ALT SERPL-CCNC: 26 U/L
ANION GAP SERPL CALC-SCNC: 1 MMOL/L (ref 0–18)
AST SERPL-CCNC: 21 U/L (ref 15–37)
BASOPHILS # BLD AUTO: 0.04 X10(3) UL (ref 0–0.2)
BASOPHILS NFR BLD AUTO: 0.7 %
BILIRUB SERPL-MCNC: 0.4 MG/DL (ref 0.1–2)
BUN BLD-MCNC: 12 MG/DL (ref 7–18)
CALCIUM BLD-MCNC: 8.8 MG/DL (ref 8.5–10.1)
CHLORIDE SERPL-SCNC: 100 MMOL/L (ref 98–112)
CO2 SERPL-SCNC: 29 MMOL/L (ref 21–32)
CREAT BLD-MCNC: 0.48 MG/DL
CRP SERPL-MCNC: 1.17 MG/DL (ref ?–0.3)
EGFRCR SERPLBLD CKD-EPI 2021: 105 ML/MIN/1.73M2 (ref 60–?)
EOSINOPHIL # BLD AUTO: 0.1 X10(3) UL (ref 0–0.7)
EOSINOPHIL NFR BLD AUTO: 1.7 %
ERYTHROCYTE [DISTWIDTH] IN BLOOD BY AUTOMATED COUNT: 13.7 %
ERYTHROCYTE [SEDIMENTATION RATE] IN BLOOD: 23 MM/HR
FASTING STATUS PATIENT QL REPORTED: NO
GLOBULIN PLAS-MCNC: 3.6 G/DL (ref 2.8–4.4)
GLUCOSE BLD-MCNC: 109 MG/DL (ref 70–99)
HCT VFR BLD AUTO: 35.8 %
HGB BLD-MCNC: 11.3 G/DL
IMM GRANULOCYTES # BLD AUTO: 0.01 X10(3) UL (ref 0–1)
IMM GRANULOCYTES NFR BLD: 0.2 %
LYMPHOCYTES # BLD AUTO: 1.24 X10(3) UL (ref 1–4)
LYMPHOCYTES NFR BLD AUTO: 21 %
MCH RBC QN AUTO: 29.2 PG (ref 26–34)
MCHC RBC AUTO-ENTMCNC: 31.6 G/DL (ref 31–37)
MCV RBC AUTO: 92.5 FL
MONOCYTES # BLD AUTO: 0.67 X10(3) UL (ref 0.1–1)
MONOCYTES NFR BLD AUTO: 11.4 %
NEUTROPHILS # BLD AUTO: 3.84 X10 (3) UL (ref 1.5–7.7)
NEUTROPHILS # BLD AUTO: 3.84 X10(3) UL (ref 1.5–7.7)
NEUTROPHILS NFR BLD AUTO: 65 %
OSMOLALITY SERPL CALC.SUM OF ELEC: 270 MOSM/KG (ref 275–295)
PLATELET # BLD AUTO: 278 10(3)UL (ref 150–450)
POTASSIUM SERPL-SCNC: 5.1 MMOL/L (ref 3.5–5.1)
PROT SERPL-MCNC: 6.8 G/DL (ref 6.4–8.2)
RBC # BLD AUTO: 3.87 X10(6)UL
SODIUM SERPL-SCNC: 130 MMOL/L (ref 136–145)
WBC # BLD AUTO: 5.9 X10(3) UL (ref 4–11)

## 2023-10-16 PROCEDURE — 85025 COMPLETE CBC W/AUTO DIFF WBC: CPT

## 2023-10-16 PROCEDURE — 80053 COMPREHEN METABOLIC PANEL: CPT

## 2023-10-16 PROCEDURE — 86140 C-REACTIVE PROTEIN: CPT

## 2023-10-16 PROCEDURE — 85652 RBC SED RATE AUTOMATED: CPT

## 2023-10-16 PROCEDURE — 36415 COLL VENOUS BLD VENIPUNCTURE: CPT

## 2023-10-17 ENCOUNTER — TELEPHONE (OUTPATIENT)
Dept: INTERNAL MEDICINE CLINIC | Facility: CLINIC | Age: 68
End: 2023-10-17

## 2023-10-17 DIAGNOSIS — E87.1 HYPONATREMIA: Primary | ICD-10-CM

## 2023-10-17 DIAGNOSIS — R73.9 BLOOD GLUCOSE ELEVATED: ICD-10-CM

## 2023-10-17 NOTE — TELEPHONE ENCOUNTER
Please let patient know that her recent blood tests showed low sodium. This could be due to her lung cancer or from decrease in appetite. I would like to repeat the test again. Orders are in.  No need to fast. Okay to add ensure/boost drinks to diet    Mirtha Gonzalez DO

## 2023-10-17 NOTE — TELEPHONE ENCOUNTER
Patient informed and says her diet hasn't changed at all but she does avoid salt and get low sodium when able. She was informed to complete test within the next few days. Patient also expressed concern over elevated blood glucose. Number has been high for years. Check hgb A1c? Wondering if it could be from inflammation vs. Other. Advised sometimes it make be possible in diabetic patient's or sometimes pain even can cause elevated glucose, but should also rule out diabetics since numbers have been high for a while. Please advise. Thank you!

## 2023-10-17 NOTE — TELEPHONE ENCOUNTER
Called and spoke with pt    Informed pt that A1c has been ordered    Pt aware of A1c, BMP and a lipid panel to complete     Informed pt to fast for her labwork    Pt v/u

## 2023-10-19 ENCOUNTER — LAB ENCOUNTER (OUTPATIENT)
Dept: LAB | Age: 68
End: 2023-10-19
Attending: INTERNAL MEDICINE
Payer: COMMERCIAL

## 2023-10-19 DIAGNOSIS — R73.9 BLOOD GLUCOSE ELEVATED: ICD-10-CM

## 2023-10-19 DIAGNOSIS — E78.2 MIXED HYPERLIPIDEMIA: ICD-10-CM

## 2023-10-19 DIAGNOSIS — E87.1 HYPONATREMIA: ICD-10-CM

## 2023-10-19 LAB
ANION GAP SERPL CALC-SCNC: 5 MMOL/L (ref 0–18)
BUN BLD-MCNC: 16 MG/DL (ref 7–18)
CALCIUM BLD-MCNC: 9 MG/DL (ref 8.5–10.1)
CHLORIDE SERPL-SCNC: 108 MMOL/L (ref 98–112)
CHOLEST SERPL-MCNC: 144 MG/DL (ref ?–200)
CO2 SERPL-SCNC: 27 MMOL/L (ref 21–32)
CREAT BLD-MCNC: 0.91 MG/DL
EGFRCR SERPLBLD CKD-EPI 2021: 70 ML/MIN/1.73M2 (ref 60–?)
EST. AVERAGE GLUCOSE BLD GHB EST-MCNC: 111 MG/DL (ref 68–126)
FASTING PATIENT LIPID ANSWER: YES
FASTING STATUS PATIENT QL REPORTED: YES
GLUCOSE BLD-MCNC: 87 MG/DL (ref 70–99)
HBA1C MFR BLD: 5.5 % (ref ?–5.7)
HDLC SERPL-MCNC: 56 MG/DL (ref 40–59)
LDLC SERPL CALC-MCNC: 76 MG/DL (ref ?–100)
NONHDLC SERPL-MCNC: 88 MG/DL (ref ?–130)
OSMOLALITY SERPL CALC.SUM OF ELEC: 291 MOSM/KG (ref 275–295)
POTASSIUM SERPL-SCNC: 4.5 MMOL/L (ref 3.5–5.1)
SODIUM SERPL-SCNC: 140 MMOL/L (ref 136–145)
TRIGL SERPL-MCNC: 57 MG/DL (ref 30–149)
VLDLC SERPL CALC-MCNC: 9 MG/DL (ref 0–30)

## 2023-10-19 PROCEDURE — 80061 LIPID PANEL: CPT

## 2023-10-19 PROCEDURE — 36415 COLL VENOUS BLD VENIPUNCTURE: CPT

## 2023-10-19 PROCEDURE — 80048 BASIC METABOLIC PNL TOTAL CA: CPT

## 2023-10-19 PROCEDURE — 83036 HEMOGLOBIN GLYCOSYLATED A1C: CPT

## 2023-11-07 ENCOUNTER — PATIENT MESSAGE (OUTPATIENT)
Dept: RHEUMATOLOGY | Facility: CLINIC | Age: 68
End: 2023-11-07

## 2023-11-07 DIAGNOSIS — R21 RASH: Primary | ICD-10-CM

## 2023-11-08 DIAGNOSIS — M06.4 INFLAMMATORY POLYARTHRITIS (HCC): ICD-10-CM

## 2023-11-08 DIAGNOSIS — M06.00 SERONEGATIVE RHEUMATOID ARTHRITIS (HCC): ICD-10-CM

## 2023-11-08 RX ORDER — LEFLUNOMIDE 20 MG/1
20 TABLET ORAL DAILY
Qty: 90 TABLET | Refills: 0 | Status: SHIPPED | OUTPATIENT
Start: 2023-11-08

## 2023-11-08 RX ORDER — BETAMETHASONE DIPROPIONATE 0.5 MG/G
1 CREAM TOPICAL 2 TIMES DAILY
Qty: 45 G | Refills: 1 | Status: SHIPPED | OUTPATIENT
Start: 2023-11-08

## 2023-11-08 NOTE — TELEPHONE ENCOUNTER
Last office visit: 6/29/2023    Next Rheum Apt:12/29/2023 Enmanuel Crane MD    Last fill: 3/23/2023 90 tab, 0 refills    Labs:   Lab Results   Component Value Date    CREATSERUM 0.91 10/19/2023    ALKPHO 113 10/16/2023    AST 21 10/16/2023    ALT 26 10/16/2023    BILT 0.4 10/16/2023    TP 6.8 10/16/2023    ALB 3.2 (L) 10/16/2023       Lab Results   Component Value Date    WBC 5.9 10/16/2023    HGB 11.3 (L) 10/16/2023    .0 10/16/2023    NEPRELIM 3.84 10/16/2023    NEPERCENT 65.0 10/16/2023    LYPERCENT 21.0 10/16/2023    NE 3.84 10/16/2023    LYMABS 1.24 10/16/2023

## 2023-11-08 NOTE — TELEPHONE ENCOUNTER
From: Cain Muniz  To: Felisha Ashleigh  Sent: 11/7/2023 11:01 AM CST  Subject: Rash    Hi Dr Jesus Dumont again to bother you. I have a rash on my stomach again and was hoping you would prescribe some Betamethasome. I used it a while back and it seemed to help. Thanks.       Lawson Leal

## 2023-12-22 DIAGNOSIS — M06.00 SERONEGATIVE RHEUMATOID ARTHRITIS (HCC): ICD-10-CM

## 2023-12-22 DIAGNOSIS — M06.4 INFLAMMATORY POLYARTHRITIS (HCC): ICD-10-CM

## 2023-12-22 RX ORDER — LEFLUNOMIDE 20 MG/1
20 TABLET ORAL DAILY
Qty: 90 TABLET | Refills: 0 | OUTPATIENT
Start: 2023-12-22

## 2023-12-24 ENCOUNTER — HOSPITAL ENCOUNTER (OUTPATIENT)
Age: 68
Discharge: HOME | End: 2023-12-24
Attending: EMERGENCY MEDICINE
Payer: COMMERCIAL

## 2023-12-24 VITALS
HEIGHT: 64 IN | SYSTOLIC BLOOD PRESSURE: 133 MMHG | RESPIRATION RATE: 16 BRPM | WEIGHT: 104 LBS | BODY MASS INDEX: 17.75 KG/M2 | HEART RATE: 73 BPM | DIASTOLIC BLOOD PRESSURE: 56 MMHG | TEMPERATURE: 98 F | OXYGEN SATURATION: 95 %

## 2023-12-24 DIAGNOSIS — R68.84 JAW PAIN: Primary | ICD-10-CM

## 2023-12-24 LAB
#MXD IC: 0.7 X10ˆ3/UL (ref 0.1–1)
BUN BLD-MCNC: 15 MG/DL (ref 7–18)
CHLORIDE BLD-SCNC: 106 MMOL/L (ref 98–112)
CO2 BLD-SCNC: 26 MMOL/L (ref 21–32)
CREAT BLD-MCNC: 0.4 MG/DL
EGFRCR SERPLBLD CKD-EPI 2021: 109 ML/MIN/1.73M2 (ref 60–?)
GLUCOSE BLD-MCNC: 83 MG/DL (ref 70–99)
HCT VFR BLD AUTO: 38.3 %
HCT VFR BLD CALC: 37 %
HGB BLD-MCNC: 11.6 G/DL
ISTAT IONIZED CALCIUM FOR CHEM 8: 1.13 MMOL/L (ref 1.12–1.32)
LYMPHOCYTES # BLD AUTO: 1.4 X10ˆ3/UL (ref 1–4)
LYMPHOCYTES NFR BLD AUTO: 27.3 %
MCH RBC QN AUTO: 28.6 PG (ref 26–34)
MCHC RBC AUTO-ENTMCNC: 30.3 G/DL (ref 31–37)
MCV RBC AUTO: 94.3 FL (ref 80–100)
MIXED CELL %: 13.9 %
NEUTROPHILS # BLD AUTO: 3.2 X10ˆ3/UL (ref 1.5–7.7)
NEUTROPHILS NFR BLD AUTO: 58.8 %
PLATELET # BLD AUTO: 247 X10ˆ3/UL (ref 150–450)
POTASSIUM BLD-SCNC: 4.3 MMOL/L (ref 3.6–5.1)
RBC # BLD AUTO: 4.06 X10ˆ6/UL
SODIUM BLD-SCNC: 141 MMOL/L (ref 136–145)
TROPONIN I BLD-MCNC: <0.02 NG/ML
WBC # BLD AUTO: 5.3 X10ˆ3/UL (ref 4–11)

## 2023-12-24 PROCEDURE — 99214 OFFICE O/P EST MOD 30 MIN: CPT

## 2023-12-24 PROCEDURE — 36415 COLL VENOUS BLD VENIPUNCTURE: CPT

## 2023-12-24 PROCEDURE — 93005 ELECTROCARDIOGRAM TRACING: CPT

## 2023-12-24 PROCEDURE — 85025 COMPLETE CBC W/AUTO DIFF WBC: CPT | Performed by: EMERGENCY MEDICINE

## 2023-12-24 PROCEDURE — 93010 ELECTROCARDIOGRAM REPORT: CPT

## 2023-12-24 PROCEDURE — 84484 ASSAY OF TROPONIN QUANT: CPT

## 2023-12-24 PROCEDURE — 80047 BASIC METABLC PNL IONIZED CA: CPT

## 2023-12-26 LAB
ATRIAL RATE: 67 BPM
P AXIS: 70 DEGREES
P-R INTERVAL: 122 MS
Q-T INTERVAL: 392 MS
QRS DURATION: 72 MS
QTC CALCULATION (BEZET): 414 MS
R AXIS: 69 DEGREES
T AXIS: 71 DEGREES
VENTRICULAR RATE: 67 BPM

## 2024-01-27 ENCOUNTER — HOSPITAL ENCOUNTER (OUTPATIENT)
Dept: CT IMAGING | Age: 69
Discharge: HOME | End: 2024-01-27
Attending: INTERNAL MEDICINE
Payer: COMMERCIAL

## 2024-01-27 DIAGNOSIS — C34.90 PRIMARY MALIGNANT NEOPLASM OF LUNG METASTATIC TO OTHER SITE, UNSPECIFIED LATERALITY (HCC): ICD-10-CM

## 2024-01-27 LAB
CREAT BLD-MCNC: 0.6 MG/DL
EGFRCR SERPLBLD CKD-EPI 2021: 99 ML/MIN/1.73M2 (ref 60–?)

## 2024-01-27 PROCEDURE — 71260 CT THORAX DX C+: CPT | Performed by: INTERNAL MEDICINE

## 2024-01-27 PROCEDURE — 74177 CT ABD & PELVIS W/CONTRAST: CPT | Performed by: INTERNAL MEDICINE

## 2024-01-27 PROCEDURE — 82565 ASSAY OF CREATININE: CPT

## 2024-01-27 RX ORDER — IOHEXOL 350 MG/ML
80 INJECTION, SOLUTION INTRAVENOUS
Status: COMPLETED | OUTPATIENT
Start: 2024-01-27 | End: 2024-01-27

## 2024-01-27 RX ADMIN — IOHEXOL 80 ML: 350 INJECTION, SOLUTION INTRAVENOUS at 19:12:00

## 2024-01-28 DIAGNOSIS — M06.4 INFLAMMATORY POLYARTHRITIS (HCC): ICD-10-CM

## 2024-01-28 DIAGNOSIS — M06.00 SERONEGATIVE RHEUMATOID ARTHRITIS (HCC): ICD-10-CM

## 2024-01-29 RX ORDER — LEFLUNOMIDE 20 MG/1
20 TABLET ORAL DAILY
Qty: 90 TABLET | Refills: 0 | Status: SHIPPED | OUTPATIENT
Start: 2024-01-29

## 2024-01-29 NOTE — TELEPHONE ENCOUNTER
Last office visit: 6/29/2023    Next Rheum Apt:3/12/2024 Negro Carlson MD    Last fill: 11/8/2023 90 tab, 0 refills    Labs:   Lab Results   Component Value Date    CREATSERUM 0.91 10/19/2023    ALKPHO 113 10/16/2023    AST 21 10/16/2023    ALT 26 10/16/2023    BILT 0.4 10/16/2023    TP 6.8 10/16/2023    ALB 3.2 (L) 10/16/2023       Lab Results   Component Value Date    WBC 5.9 10/16/2023    HGB 11.3 (L) 10/16/2023    .0 10/16/2023    NEPRELIM 3.84 10/16/2023    NEPERCENT 58.8 12/24/2023    LYPERCENT 27.3 12/24/2023    NE 3.84 10/16/2023    LYMABS 1.24 10/16/2023

## 2024-01-29 NOTE — TELEPHONE ENCOUNTER
Spoke to patient regarding the below message:    Call pt, make sure she gets repeat monitoring blood work with heme/onc in Feb 2024     Patient verbalized understanding and denied any questions at this time. Patient states that she has an apt on 02/12/2024 with Hem/Onc

## 2024-02-12 ENCOUNTER — OFFICE VISIT (OUTPATIENT)
Dept: HEMATOLOGY/ONCOLOGY | Facility: HOSPITAL | Age: 69
End: 2024-02-12
Attending: INTERNAL MEDICINE
Payer: MEDICARE

## 2024-02-12 VITALS
TEMPERATURE: 98 F | HEART RATE: 80 BPM | BODY MASS INDEX: 18.24 KG/M2 | RESPIRATION RATE: 16 BRPM | HEIGHT: 64.02 IN | WEIGHT: 106.81 LBS | DIASTOLIC BLOOD PRESSURE: 74 MMHG | OXYGEN SATURATION: 92 % | SYSTOLIC BLOOD PRESSURE: 128 MMHG

## 2024-02-12 DIAGNOSIS — M19.90 INFLAMMATORY ARTHRITIS: ICD-10-CM

## 2024-02-12 DIAGNOSIS — C34.90 PRIMARY MALIGNANT NEOPLASM OF LUNG METASTATIC TO OTHER SITE, UNSPECIFIED LATERALITY (HCC): ICD-10-CM

## 2024-02-12 LAB
ALBUMIN SERPL-MCNC: 3.3 G/DL (ref 3.4–5)
ALBUMIN/GLOB SERPL: 1 {RATIO} (ref 1–2)
ALP LIVER SERPL-CCNC: 121 U/L
ALT SERPL-CCNC: 27 U/L
ANION GAP SERPL CALC-SCNC: 0 MMOL/L (ref 0–18)
AST SERPL-CCNC: 15 U/L (ref 15–37)
BASOPHILS # BLD AUTO: 0.05 X10(3) UL (ref 0–0.2)
BASOPHILS NFR BLD AUTO: 0.9 %
BILIRUB SERPL-MCNC: 0.2 MG/DL (ref 0.1–2)
BUN BLD-MCNC: 14 MG/DL (ref 9–23)
CALCIUM BLD-MCNC: 8.6 MG/DL (ref 8.5–10.1)
CHLORIDE SERPL-SCNC: 112 MMOL/L (ref 98–112)
CO2 SERPL-SCNC: 29 MMOL/L (ref 21–32)
CREAT BLD-MCNC: 0.47 MG/DL
CRP SERPL-MCNC: 0.92 MG/DL (ref ?–0.3)
EGFRCR SERPLBLD CKD-EPI 2021: 105 ML/MIN/1.73M2 (ref 60–?)
EOSINOPHIL # BLD AUTO: 0.27 X10(3) UL (ref 0–0.7)
EOSINOPHIL NFR BLD AUTO: 4.8 %
ERYTHROCYTE [DISTWIDTH] IN BLOOD BY AUTOMATED COUNT: 13.3 %
ERYTHROCYTE [SEDIMENTATION RATE] IN BLOOD: 12 MM/HR
GLOBULIN PLAS-MCNC: 3.4 G/DL (ref 2.8–4.4)
GLUCOSE BLD-MCNC: 123 MG/DL (ref 70–99)
HCT VFR BLD AUTO: 35.3 %
HGB BLD-MCNC: 11.3 G/DL
IMM GRANULOCYTES # BLD AUTO: 0.01 X10(3) UL (ref 0–1)
IMM GRANULOCYTES NFR BLD: 0.2 %
LYMPHOCYTES # BLD AUTO: 1.57 X10(3) UL (ref 1–4)
LYMPHOCYTES NFR BLD AUTO: 27.6 %
MCH RBC QN AUTO: 30 PG (ref 26–34)
MCHC RBC AUTO-ENTMCNC: 32 G/DL (ref 31–37)
MCV RBC AUTO: 93.6 FL
MONOCYTES # BLD AUTO: 0.56 X10(3) UL (ref 0.1–1)
MONOCYTES NFR BLD AUTO: 9.9 %
NEUTROPHILS # BLD AUTO: 3.22 X10 (3) UL (ref 1.5–7.7)
NEUTROPHILS # BLD AUTO: 3.22 X10(3) UL (ref 1.5–7.7)
NEUTROPHILS NFR BLD AUTO: 56.6 %
OSMOLALITY SERPL CALC.SUM OF ELEC: 294 MOSM/KG (ref 275–295)
PLATELET # BLD AUTO: 211 10(3)UL (ref 150–450)
POTASSIUM SERPL-SCNC: 3.7 MMOL/L (ref 3.5–5.1)
PROT SERPL-MCNC: 6.7 G/DL (ref 6.4–8.2)
RBC # BLD AUTO: 3.77 X10(6)UL
SODIUM SERPL-SCNC: 141 MMOL/L (ref 136–145)
WBC # BLD AUTO: 5.7 X10(3) UL (ref 4–11)

## 2024-02-12 PROCEDURE — 99214 OFFICE O/P EST MOD 30 MIN: CPT | Performed by: INTERNAL MEDICINE

## 2024-02-12 NOTE — PROGRESS NOTES
Cancer Center Progress Note  Patient Name: Tatyana Christie   YOB: 1955   Medical Record Number: [id number]   CSN: [id number]   Attending Physician: Vishnu Cuenca M.D.       Date of Visit: 2/12/2024     Chief Complaint:  Chief Complaint   Patient presents with    Follow - Up        Oncologic History:  Tatyana Christie is a 68 year old female  referred by Dr. Kaur for evaluation and treatment of her lung cancer.  The patient reports that she was in her usual state of health until July, 2020, when she contracted COVID19. She reports that she recovered, but has had intermittent dizziness and palpitations since then.  The workup included a CXR that revealed abnormalities of the RUL and LLL.  This prompted CT that revealed a RUL mass and a LLL mass.  Subsequent PET scan confirmed PET avid masses in the RUL and LLL, as well as a positive pre-carinal LN. She underwent a CT guided biopsy of the RUL mass that revealed adenocarcinoma consistent with a lung primary.  EGFR and ALK were negative, ROS-1 was equivocal, and PDL-1 was 11-20%.  She suffered a pneumothorax as a result of the biopsy and was hospitalized with a pigtail catheter placement.   She repors that an echo was normal, but she continues to complain of intermittent palpitations and dizziness. She has been referred to cardiology for evaluation.   She has no CP or SOB. No palpable LAD or masses.  No F/C/NS.  She has a significant smoking history.        She underwent MRI brain that was negative for metastases. Repeat CT of the chest revealed progression of the RUL mass, LLL mass, and adenopathy, but no new lesions.    She underwent navigational bronchoscopy with EBUS guided biopsy of a 4R node and the LLL mass. She tolerated the procedure well.  The jeramie biopsy was positive for NSCLCa. The LLL mass was read as normal lung tissue.   Her case was reviewed at the [facility] Thoracic tumor board, where CT guided biopsy of the LLL mass was  recommended.   The patient steadfastly refused to consider any further biopsy or workup.  After discussing treatment options for presumed stage IV lung cancer, we settled on combined immunotherapy.  She developed severe arthritis, chronic fever, and rash that prompted holding the immunotherapy.     Symptoms were somewhat relieved with steroids.  She was seen by rheumatology and started on Leflunamide.     History of Present Illness:  Pt is here for follow up. She reports improvement in her joint pain recently.     Performance Status:  ECOG 1    Past Medical History:  Past Medical History:   Diagnosis Date    Anesthesia complication     stated they had a hard time waking pt up after ankle surgery     Arrhythmia     Back problem     Cataract     Lung cancer (HCC) 10/06/2020    Adenocarcinoma    Pneumothorax, right     Shortness of breath     Visual impairment     contacts and glasses       Past Surgical History:  Past Surgical History:   Procedure Laterality Date    CATARACT Right 2023    CATARACT Left 2023    FRACTURE SURGERY Right     right ankle fracture surgery with pins and plates     OTHER SURGICAL HISTORY      laser surgery on retina       Family History:  Family History   Problem Relation Age of Onset    Heart Disorder Mother        Social History:  Social History     Socioeconomic History    Marital status:      Spouse name: Not on file    Number of children: Not on file    Years of education: Not on file    Highest education level: Not on file   Occupational History    Not on file   Tobacco Use    Smoking status: Former     Packs/day: 1.50     Years: 40.00     Additional pack years: 0.00     Total pack years: 60.00     Types: Cigarettes     Start date: 1968     Quit date: 2010     Years since quittin.3    Smokeless tobacco: Never   Vaping Use    Vaping Use: Never used   Substance and Sexual Activity    Alcohol use: Not Currently    Drug use: Never    Sexual activity: Not on file    Other Topics Concern    Caffeine Concern Yes    Exercise No    Seat Belt Not Asked    Special Diet Not Asked    Stress Concern Not Asked    Weight Concern Not Asked   Social History Narrative    Not on file     Social Determinants of Health     Financial Resource Strain: Not on file   Food Insecurity: Not on file   Transportation Needs: Not on file   Physical Activity: Not on file   Stress: Not on file   Social Connections: Not on file   Housing Stability: Not on file       Current Medications:    Current Outpatient Medications:     leflunomide 20 MG Oral Tab, Take 1 tablet (20 mg total) by mouth daily., Disp: 90 tablet, Rfl: 0    betamethasone dipropionate 0.05 % External Cream, Apply 1 Application topically 2 (two) times daily., Disp: 45 g, Rfl: 1    Acetaminophen (TYLENOL ARTHRITIS PAIN OR), Take by mouth., Disp: , Rfl:     traMADol 50 MG Oral Tab, Take 1 tablet (50 mg total) by mouth every 12 (twelve) hours as needed for Pain. (Patient not taking: Reported on 12/24/2023), Disp: 20 tablet, Rfl: 0    DANDELION OR, Take by mouth., Disp: , Rfl:     Nettle, Urtica Dioica, (NETTLE LEAF OR), Take by mouth., Disp: , Rfl:     Ginger, Zingiber officinalis, (GINGER OR), Take by mouth. Oral ginger supplement 1x daily, Disp: , Rfl:     ibuprofen 200 MG Oral Tab, Take 3 tablets (600 mg total) by mouth daily. (Patient not taking: Reported on 12/24/2023), Disp: , Rfl:     Turmeric (QC TUMERIC COMPLEX OR), Take by mouth., Disp: , Rfl:     Multiple Vitamins-Minerals (MULTI-VITAMIN/MINERALS) Oral Tab, Take 1 tablet by mouth daily., Disp: , Rfl:     Allergies:  No Known Allergies     Review of Systems:    Constitutional No chills, night sweats, or weight loss.   Eyes No significant visual difficulties. No diplopia. No yellowing.   Hematologic/Lymphatic Normal - No easy bruising or bleeding.  No tender or palpable lymph nodes.   Respiratory No cough or hemoptysis.   Cardiovascular No anginal chest pain, palpitations or orthopnea.    Gastrointestinal No nausea, vomiting, diarrhea, GI bleeding. NL appetite.   Genitorurinary  No hematuria, dysuria, abnormal bleeding, or incontinence.   Integumentary No yellowing of the skin   Neurologic No headache, blurred vision, and no areas of focal weakness. Normal gait.   Psychiatric No insomnia, depression, olayinka or mood swings.       Vital Signs:  There were no vitals taken for this visit.      Physical Examination:    Constitutional Normal - Mood and affect appropriate. Appears close to chronological age. Well nourished. Well developed.   Eyes Normal - Conjunctivae and sclerae are clear and without icterus. Pupils are reactive and equal.   Hematologic/Lymphatic Normal - No petechiae or purpura.  No tender or palpable lymph nodes in the cervical, supraclavicular, axillary or inguinal area.   Respiratory Normal - Lungs are clear to auscultation, no wheezing.   Cardiovascular Normal - Regular rate and rhythm, no murmurs.   Abdomen Normal - Non-tender, non-distended, no masses, ascites or hepatosplenomegaly.    Extremities No edema.    Integumentary Erythematous rash of the abdomen w/o blisters or excoriation. Improved.    Neurologic Normal - No sensory or motor deficits, normal cerebellar function, cranial nerves intact.   Psychiatric Normal - A&Ox3. Coherent speech. Verbalizes understanding of our discussions today.           Laboratory:  Recent Labs     02/12/24  1243   RBC 3.77 L   HGB 11.3 L   HCT 35.3   MCV 93.6   MCH 30.0   MCHC 32.0   RDW 13.3   NEPRELIM 3.22   WBC 5.7   .0     Recent Labs     02/12/24  1243    H   BUN 14   CREATSERUM 0.47 L   CA 8.6   ALB 3.3 L      K 3.7      CO2 29.0   ALKPHO 121   AST 15   ALT 27   BILT 0.2   TP 6.7       Radiology:  CT C/A/P: CONCLUSION:    1. The prior noted paired spiculated nodule and adjacent cavitary lesion within the periphery of the left upper lobe are both increased in size from prior imaging, concerning for increasing  metastatic disease.  The leg is   2. Subcentimeter pulmonary nodule within the left upper lobe, slightly increased in size from prior imaging, currently measuring 6 x 6 mm, previously measuring 4 x 5 mm.   3. Prior noted stellate focus of scarring with central nodularity within the right upper lobe is stable from prior imaging.   4. Stable ground-glass opacity within the right middle lobe.   5. Stable nonspecific small hypoattenuating lesions within the liver, too small to accurately characterize.     I personally reviewed the images with side by side comparison. Overall, the exam is stable. The changes are most consistent with immune effect.                 Pathology:  Final Diagnosis:   Needle core biopsy of right upper lobe lung mass:  -POSITIVE for adenocarcinoma; consistent with lung primary.           Final Diagnosis:   A.  EBUS guided fine-needle aspiration of 4R lymph node:  -Adequate for evaluation.  -POSITIVE for metastatic adenocarcinoma; consistent with known lung primary.  -(See comment).    B.  EBUS guided fine-needle aspiration of left lower lobe lung mass:  -Adequate for evaluation.  -No cytologic evidence of malignancy.  -Specimen composed predominantly of benign bronchial epithelial cells.    C.  Cytospin smears and cell block from bronchoalveolar lavage, left lower lobe of lung:  -Adequate for evaluation.  -No cytologic evidence of malignancy.  -Specimen composed of benign alveolar macrophages, respiratory epithelial cells and mixed inflammatory cells.     Final Diagnosis:   Endobronchial biopsy, left lower lobe lung:   -Fragments of benign lung tissue with no significant pathologic abnormalities.   -No evidence of malignancy.          Impression and Plan:      Non-small cell lung cancer: The patient's CT demonstrates progression of the RUL and LLL lesions, concerning for both being malignant.  Her case was reviewed at the multidisciplinary thoracic tumor board, where the possibility of two  curable primaries was raised. Recommendation was made for navigational bronchoscopy to biopsy the mediastinal nodes and LLL mass. The 4R LN is positive for adenocarcinoma, c/w at least stage IIIA disease. The normal pathology of the LLL is discordant with the CT and PET findings of an enlarging PET positive mass.  We discussed that repeat biopsy was recommended.  She steadfastly refuses additional biopsies.  In the absence of the biopsy, definitively demonstrating benign etiology of the LLL, I recommend that we proceed as if this is Stage IV disease, as it appears on PET. We discussed treatment options, including palliative chemo, chemo-immunotherapy with ipi/nivo and 2 cycles of chemotherapy, or just immunotherapy with Ipi/Nivo. With PDL-1 only 11-20%, I don not recommend Keyrtuda alone.  She does not want to add the risk of chemo side effects to the risk of immunotherapy and has elected to proceed with Combined immunotherapy.  We reviewed the risks, benefits, and side effects, and she agreed to proceed. She complained of fatigue and fever that were intolerable, prompting holding therapy. Her CT, at that time, demonstrated response to therapy. She also developed a rash on her abdomen. We discussed a course of prednisone to calm the side effects, but she initially declined, in favor of CBD gummies. She eventually agreed to 50mg daily x5 days, and when she improved, a prolonged wean. She is better, but symptoms wax and wane, She is following with rheumatology. The repeat CT is stable. F/U 6 months.    Shoulder arthritis and arthralgia: Much improved. Continue to follow with rheumatology. Continue leflunomide        Planned Follow Up:  6 months    Electronically Signed by:    Vishnu Cuenca M.D.  [facility]

## 2024-02-12 NOTE — PROGRESS NOTES
Pt here for follow up.  Recent CT.   No new complaints.    Outpatient Oncology Care Plan  Problem list:  knowledge deficit    Problems related to:    disease/disease progression    Interventions:  provided general teaching    Expected outcomes:  understands plan of care    Progress towards outcome:  making progress    Education Record    Learner:  Patient and Spouse  Barriers / Limitations:  None  Method:  Brief focused  Outcome:  Shows understanding  Comments:

## 2024-02-16 ENCOUNTER — APPOINTMENT (OUTPATIENT)
Dept: HEMATOLOGY/ONCOLOGY | Facility: HOSPITAL | Age: 69
End: 2024-02-16
Attending: INTERNAL MEDICINE
Payer: MEDICARE

## 2024-03-07 ENCOUNTER — TELEPHONE (OUTPATIENT)
Dept: HEMATOLOGY/ONCOLOGY | Facility: HOSPITAL | Age: 69
End: 2024-03-07

## 2024-03-07 DIAGNOSIS — C34.90 PRIMARY MALIGNANT NEOPLASM OF LUNG METASTATIC TO OTHER SITE, UNSPECIFIED LATERALITY (HCC): Primary | ICD-10-CM

## 2024-03-07 NOTE — TELEPHONE ENCOUNTER
Patient is calling to get an order for her CT SCAN upper torso entered so she can schedule it. Called 3/7/24

## 2024-03-12 ENCOUNTER — OFFICE VISIT (OUTPATIENT)
Dept: RHEUMATOLOGY | Facility: CLINIC | Age: 69
End: 2024-03-12
Payer: MEDICARE

## 2024-03-12 VITALS
DIASTOLIC BLOOD PRESSURE: 66 MMHG | OXYGEN SATURATION: 96 % | HEIGHT: 64 IN | HEART RATE: 66 BPM | BODY MASS INDEX: 17.82 KG/M2 | WEIGHT: 104.38 LBS | SYSTOLIC BLOOD PRESSURE: 116 MMHG | RESPIRATION RATE: 16 BRPM | TEMPERATURE: 98 F

## 2024-03-12 DIAGNOSIS — M19.041 OSTEOARTHRITIS OF BOTH HANDS, UNSPECIFIED OSTEOARTHRITIS TYPE: ICD-10-CM

## 2024-03-12 DIAGNOSIS — M19.012 ARTHRITIS OF LEFT GLENOHUMERAL JOINT: ICD-10-CM

## 2024-03-12 DIAGNOSIS — M06.4 INFLAMMATORY POLYARTHRITIS (HCC): Primary | ICD-10-CM

## 2024-03-12 DIAGNOSIS — M06.00 SERONEGATIVE RHEUMATOID ARTHRITIS (HCC): ICD-10-CM

## 2024-03-12 DIAGNOSIS — M47.816 OSTEOARTHRITIS OF LUMBAR SPINE, UNSPECIFIED SPINAL OSTEOARTHRITIS COMPLICATION STATUS: ICD-10-CM

## 2024-03-12 DIAGNOSIS — M81.0 AGE-RELATED OSTEOPOROSIS WITHOUT CURRENT PATHOLOGICAL FRACTURE: ICD-10-CM

## 2024-03-12 DIAGNOSIS — M19.042 OSTEOARTHRITIS OF BOTH HANDS, UNSPECIFIED OSTEOARTHRITIS TYPE: ICD-10-CM

## 2024-03-12 PROCEDURE — 99214 OFFICE O/P EST MOD 30 MIN: CPT | Performed by: INTERNAL MEDICINE

## 2024-03-12 NOTE — PATIENT INSTRUCTIONS
-continue leflunomide 20 mg daily (restarted in September 2023)  -repeat blood work in 2 months  -get X-ray of the left shoulder  -repeat bone density scan.   -see pain management to consider epidural injections.  -recommend physical therapy, will hold off. Do more walking at home.    -Limited evidence for OA and glucosamine/chondrotoin. However, risks are small. Ok to continue if there is perceived clinical benefit by patient. Dosing: glucosamine sulfate (1500 mg/day) or chondroitin (800 mg/day)       Check out the following pharmacy.  It may save you some money.  [facility]. Phone: [phone]  [email or web address]
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
## 2024-04-05 ENCOUNTER — PATIENT OUTREACH (OUTPATIENT)
Dept: INTERNAL MEDICINE CLINIC | Facility: CLINIC | Age: 69
End: 2024-04-05

## 2024-05-03 ENCOUNTER — LAB ENCOUNTER (OUTPATIENT)
Dept: LAB | Age: 69
End: 2024-05-03
Attending: INTERNAL MEDICINE
Payer: MEDICARE

## 2024-05-03 DIAGNOSIS — M06.00 SERONEGATIVE RHEUMATOID ARTHRITIS (HCC): ICD-10-CM

## 2024-05-03 DIAGNOSIS — M81.0 AGE-RELATED OSTEOPOROSIS WITHOUT CURRENT PATHOLOGICAL FRACTURE: ICD-10-CM

## 2024-05-03 DIAGNOSIS — M19.012 ARTHRITIS OF LEFT GLENOHUMERAL JOINT: ICD-10-CM

## 2024-05-03 DIAGNOSIS — M06.4 INFLAMMATORY POLYARTHRITIS (HCC): ICD-10-CM

## 2024-05-03 LAB
ALBUMIN SERPL-MCNC: 4.1 G/DL (ref 3.2–4.8)
ALBUMIN/GLOB SERPL: 1.6 {RATIO} (ref 1–2)
ALP LIVER SERPL-CCNC: 91 U/L
ALT SERPL-CCNC: 23 U/L
ANION GAP SERPL CALC-SCNC: 2 MMOL/L (ref 0–18)
AST SERPL-CCNC: 25 U/L (ref ?–34)
BASOPHILS # BLD AUTO: 0.03 X10(3) UL (ref 0–0.2)
BASOPHILS NFR BLD AUTO: 0.9 %
BILIRUB SERPL-MCNC: 0.4 MG/DL (ref 0.2–1.1)
BUN BLD-MCNC: 13 MG/DL (ref 9–23)
BUN/CREAT SERPL: 25 (ref 10–20)
CALCIUM BLD-MCNC: 8.8 MG/DL (ref 8.7–10.4)
CHLORIDE SERPL-SCNC: 108 MMOL/L (ref 98–112)
CO2 SERPL-SCNC: 29 MMOL/L (ref 21–32)
CREAT BLD-MCNC: 0.52 MG/DL
CRP SERPL-MCNC: 0.7 MG/DL (ref ?–1)
DEPRECATED RDW RBC AUTO: 49.1 FL (ref 35.1–46.3)
EGFRCR SERPLBLD CKD-EPI 2021: 102 ML/MIN/1.73M2 (ref 60–?)
EOSINOPHIL # BLD AUTO: 0.16 X10(3) UL (ref 0–0.7)
EOSINOPHIL NFR BLD AUTO: 4.6 %
ERYTHROCYTE [DISTWIDTH] IN BLOOD BY AUTOMATED COUNT: 14.1 % (ref 11–15)
ERYTHROCYTE [SEDIMENTATION RATE] IN BLOOD: 16 MM/HR
FASTING STATUS PATIENT QL REPORTED: NO
FOLATE SERPL-MCNC: 18.8 NG/ML (ref 5.4–?)
GLOBULIN PLAS-MCNC: 2.5 G/DL (ref 2–3.5)
GLUCOSE BLD-MCNC: 86 MG/DL (ref 70–99)
HCT VFR BLD AUTO: 36.1 %
HGB BLD-MCNC: 11.6 G/DL
IMM GRANULOCYTES # BLD AUTO: 0 X10(3) UL (ref 0–1)
IMM GRANULOCYTES NFR BLD: 0 %
LYMPHOCYTES # BLD AUTO: 1.13 X10(3) UL (ref 1–4)
LYMPHOCYTES NFR BLD AUTO: 32.3 %
MAGNESIUM SERPL-MCNC: 2.1 MG/DL (ref 1.6–2.6)
MCH RBC QN AUTO: 30.8 PG (ref 26–34)
MCHC RBC AUTO-ENTMCNC: 32.1 G/DL (ref 31–37)
MCV RBC AUTO: 95.8 FL
MONOCYTES # BLD AUTO: 0.64 X10(3) UL (ref 0.1–1)
MONOCYTES NFR BLD AUTO: 18.3 %
NEUTROPHILS # BLD AUTO: 1.54 X10 (3) UL (ref 1.5–7.7)
NEUTROPHILS # BLD AUTO: 1.54 X10(3) UL (ref 1.5–7.7)
NEUTROPHILS NFR BLD AUTO: 43.9 %
OSMOLALITY SERPL CALC.SUM OF ELEC: 287 MOSM/KG (ref 275–295)
PHOSPHATE SERPL-MCNC: 3.9 MG/DL (ref 2.4–5.1)
PLATELET # BLD AUTO: 274 10(3)UL (ref 150–450)
POTASSIUM SERPL-SCNC: 5 MMOL/L (ref 3.5–5.1)
PROT SERPL-MCNC: 6.6 G/DL (ref 5.7–8.2)
RBC # BLD AUTO: 3.77 X10(6)UL
SODIUM SERPL-SCNC: 139 MMOL/L (ref 136–145)
VIT B12 SERPL-MCNC: 742 PG/ML (ref 211–911)
VIT D+METAB SERPL-MCNC: 37.9 NG/ML (ref 30–100)
WBC # BLD AUTO: 3.5 X10(3) UL (ref 4–11)

## 2024-05-03 PROCEDURE — 82607 VITAMIN B-12: CPT

## 2024-05-03 PROCEDURE — 80053 COMPREHEN METABOLIC PANEL: CPT

## 2024-05-03 PROCEDURE — 82746 ASSAY OF FOLIC ACID SERUM: CPT

## 2024-05-03 PROCEDURE — 85025 COMPLETE CBC W/AUTO DIFF WBC: CPT

## 2024-05-03 PROCEDURE — 84100 ASSAY OF PHOSPHORUS: CPT

## 2024-05-03 PROCEDURE — 36415 COLL VENOUS BLD VENIPUNCTURE: CPT

## 2024-05-03 PROCEDURE — 82306 VITAMIN D 25 HYDROXY: CPT

## 2024-05-03 PROCEDURE — 86140 C-REACTIVE PROTEIN: CPT

## 2024-05-03 PROCEDURE — 83735 ASSAY OF MAGNESIUM: CPT

## 2024-05-03 PROCEDURE — 85652 RBC SED RATE AUTOMATED: CPT

## 2024-05-05 ENCOUNTER — TELEPHONE (OUTPATIENT)
Dept: RHEUMATOLOGY | Facility: CLINIC | Age: 69
End: 2024-05-05

## 2024-05-05 DIAGNOSIS — Z51.81 THERAPEUTIC DRUG MONITORING: ICD-10-CM

## 2024-05-05 DIAGNOSIS — M06.4 INFLAMMATORY POLYARTHRITIS (HCC): Primary | ICD-10-CM

## 2024-05-06 ENCOUNTER — PATIENT MESSAGE (OUTPATIENT)
Dept: RHEUMATOLOGY | Facility: CLINIC | Age: 69
End: 2024-05-06

## 2024-05-06 RX ORDER — LEFLUNOMIDE 20 MG/1
20 TABLET ORAL DAILY
Qty: 90 TABLET | Refills: 0 | Status: SHIPPED | OUTPATIENT
Start: 2024-05-06

## 2024-05-06 NOTE — TELEPHONE ENCOUNTER
From: Tatyana Christie  To: Negro Carlson  Sent: 5/6/2024 4:46 AM CDT  Subject: Labs    Hi Dr. Carlson ,      Should I be concerned that my BUN/CREA RATIO was high, 25.0?     Tatyana

## 2024-05-06 NOTE — TELEPHONE ENCOUNTER
Last office visit: 3/12/2024    Next Rheum Apt:7/19/2024 Negro Carlson MD    Last fill: 1/29/2023    Labs:   Lab Results   Component Value Date    CREATSERUM 0.52 (L) 05/03/2024    ALKPHO 91 05/03/2024    AST 25 05/03/2024    ALT 23 05/03/2024    BILT 0.4 05/03/2024    TP 6.6 05/03/2024    ALB 4.1 05/03/2024       Lab Results   Component Value Date    WBC 3.5 (L) 05/03/2024    HGB 11.6 (L) 05/03/2024    .0 05/03/2024    NEPRELIM 1.54 05/03/2024    NEPERCENT 43.9 05/03/2024    LYPERCENT 32.3 05/03/2024    NE 1.54 05/03/2024    LYMABS 1.13 05/03/2024

## 2024-06-04 ENCOUNTER — MED REC SCAN ONLY (OUTPATIENT)
Dept: RHEUMATOLOGY | Facility: CLINIC | Age: 69
End: 2024-06-04

## 2024-06-06 PROBLEM — M81.0 SENILE OSTEOPOROSIS: Status: ACTIVE | Noted: 2024-06-06

## 2024-06-07 ENCOUNTER — TELEPHONE (OUTPATIENT)
Dept: HEMATOLOGY/ONCOLOGY | Facility: HOSPITAL | Age: 69
End: 2024-06-07

## 2024-06-17 ENCOUNTER — PATIENT MESSAGE (OUTPATIENT)
Dept: RHEUMATOLOGY | Facility: CLINIC | Age: 69
End: 2024-06-17

## 2024-06-17 ENCOUNTER — OFFICE VISIT (OUTPATIENT)
Dept: RHEUMATOLOGY | Facility: CLINIC | Age: 69
End: 2024-06-17
Payer: MEDICARE

## 2024-06-17 VITALS
WEIGHT: 104 LBS | SYSTOLIC BLOOD PRESSURE: 122 MMHG | BODY MASS INDEX: 17.75 KG/M2 | HEART RATE: 75 BPM | DIASTOLIC BLOOD PRESSURE: 78 MMHG | HEIGHT: 64 IN | TEMPERATURE: 98 F | OXYGEN SATURATION: 96 % | RESPIRATION RATE: 16 BRPM

## 2024-06-17 DIAGNOSIS — D84.821 IMMUNODEFICIENCY DUE TO DRUG THERAPY (HCC): ICD-10-CM

## 2024-06-17 DIAGNOSIS — Z79.899 IMMUNODEFICIENCY DUE TO DRUG THERAPY (HCC): ICD-10-CM

## 2024-06-17 DIAGNOSIS — M05.79 RHEUMATOID ARTHRITIS INVOLVING MULTIPLE SITES WITH POSITIVE RHEUMATOID FACTOR (HCC): Primary | ICD-10-CM

## 2024-06-17 DIAGNOSIS — M25.461 EFFUSION OF RIGHT KNEE: Primary | ICD-10-CM

## 2024-06-17 DIAGNOSIS — M81.0 AGE RELATED OSTEOPOROSIS, UNSPECIFIED PATHOLOGICAL FRACTURE PRESENCE: ICD-10-CM

## 2024-06-17 DIAGNOSIS — Z51.81 THERAPEUTIC DRUG MONITORING: ICD-10-CM

## 2024-06-17 DIAGNOSIS — M05.79 RHEUMATOID ARTHRITIS INVOLVING MULTIPLE SITES WITH POSITIVE RHEUMATOID FACTOR (HCC): ICD-10-CM

## 2024-06-17 DIAGNOSIS — M06.4 INFLAMMATORY POLYARTHRITIS (HCC): ICD-10-CM

## 2024-06-17 LAB
BASOPHILS NFR SNV: 0 %
COLOR FLD: YELLOW
CRYSTALS SNV QL MICRO: NEGATIVE
EOSINOPHIL NFR SNV: 0 %
GRANULOCYTES # SNV AUTO: ABNORMAL /MM3 (ref 0–200)
LYMPHOCYTES NFR SNV: 4 %
MONOS+MACROS NFR SNV: 5 %
NEUTROPHILS NFR SNV: 91 %
RBC # FLD AUTO: <3000 /MM3 (ref ?–1)
TOTAL CELLS COUNTED FLD: 100

## 2024-06-17 PROCEDURE — 99214 OFFICE O/P EST MOD 30 MIN: CPT | Performed by: INTERNAL MEDICINE

## 2024-06-17 PROCEDURE — 20610 DRAIN/INJ JOINT/BURSA W/O US: CPT | Performed by: INTERNAL MEDICINE

## 2024-06-17 PROCEDURE — 89050 BODY FLUID CELL COUNT: CPT | Performed by: INTERNAL MEDICINE

## 2024-06-17 PROCEDURE — 87205 SMEAR GRAM STAIN: CPT | Performed by: INTERNAL MEDICINE

## 2024-06-17 PROCEDURE — 89060 EXAM SYNOVIAL FLUID CRYSTALS: CPT | Performed by: INTERNAL MEDICINE

## 2024-06-17 PROCEDURE — 87070 CULTURE OTHR SPECIMN AEROBIC: CPT | Performed by: INTERNAL MEDICINE

## 2024-06-17 RX ORDER — PREDNISONE 20 MG/1
20 TABLET ORAL DAILY
Qty: 30 TABLET | Refills: 0 | Status: SHIPPED | OUTPATIENT
Start: 2024-06-17 | End: 2024-07-17

## 2024-06-17 RX ORDER — ALENDRONATE SODIUM 70 MG/1
70 TABLET ORAL
Qty: 13 TABLET | Refills: 3 | Status: SHIPPED | OUTPATIENT
Start: 2024-06-17 | End: 2024-06-17

## 2024-06-17 RX ORDER — ALENDRONATE SODIUM 70 MG/1
70 TABLET ORAL
Qty: 13 TABLET | Refills: 3 | Status: SHIPPED | OUTPATIENT
Start: 2024-06-17
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
## 2024-06-17 NOTE — PATIENT INSTRUCTIONS
The fosamax should be taken on an empty stomach with an 8 oz glass of water. Do not take it with other beverages. You must remain upright (sitting or standing--no lying down) for 30 minutes  after taking the medication. Do not take any additional medications, beverages or food for 45 to 60 minutes after taking the medication.    Esophageal irritation (oral bisphosphonates), headaches, abdominal pain, nausea, vomiting, can occur Other rare side effects such as atypical femoral fractures (if taken for many years, the risk is very low and osteonecrosis of the jaw (which is rare and usually happens after tooth extraction or an infection of the jaw).    The last two side effects are unlikely as atypical femoral fractures are only after several years of the medication (and are still rare) and your teeth have been great.

## 2024-06-17 NOTE — PROGRESS NOTES
Procedure Note: right knee aspiration.    The risks, benefits, and alternatives of the procedure were explained, and verbal consent was obtained. Area over right lateral aspect of knee was prepped with chlorhexidine 2% and then 70% isopropyl alcohol stick swab x 3 and numbed with ethyl choloride spray. 25-gauge needle was inserted, 1.5 cc of lidocaine was injected in subcutaneous space. 18 g needle inserted and 40 cc of hazy yellow fluid was aspirated. Patient tolerated procedure well without any immediate complications.

## 2024-06-26 ENCOUNTER — HOSPITAL ENCOUNTER (OUTPATIENT)
Dept: GENERAL RADIOLOGY | Age: 69
Discharge: HOME | End: 2024-06-26
Attending: INTERNAL MEDICINE

## 2024-06-26 DIAGNOSIS — M81.0 AGE-RELATED OSTEOPOROSIS WITHOUT CURRENT PATHOLOGICAL FRACTURE: ICD-10-CM

## 2024-06-26 DIAGNOSIS — M06.4 INFLAMMATORY POLYARTHRITIS (HCC): ICD-10-CM

## 2024-06-26 DIAGNOSIS — M19.012 ARTHRITIS OF LEFT GLENOHUMERAL JOINT: ICD-10-CM

## 2024-06-26 DIAGNOSIS — M06.00 SERONEGATIVE RHEUMATOID ARTHRITIS (HCC): ICD-10-CM

## 2024-06-26 PROCEDURE — 73030 X-RAY EXAM OF SHOULDER: CPT | Performed by: INTERNAL MEDICINE

## 2024-07-02 ENCOUNTER — OFFICE VISIT (OUTPATIENT)
Dept: INTERNAL MEDICINE CLINIC | Facility: CLINIC | Age: 69
End: 2024-07-02
Payer: MEDICARE

## 2024-07-02 VITALS
TEMPERATURE: 98 F | HEART RATE: 64 BPM | SYSTOLIC BLOOD PRESSURE: 122 MMHG | OXYGEN SATURATION: 97 % | WEIGHT: 105 LBS | BODY MASS INDEX: 17.93 KG/M2 | RESPIRATION RATE: 14 BRPM | HEIGHT: 64 IN | DIASTOLIC BLOOD PRESSURE: 72 MMHG

## 2024-07-02 DIAGNOSIS — M06.4 INFLAMMATORY POLYARTHRITIS (HCC): ICD-10-CM

## 2024-07-02 DIAGNOSIS — E78.2 MIXED HYPERLIPIDEMIA: ICD-10-CM

## 2024-07-02 DIAGNOSIS — C34.90 NON-SMALL CELL LUNG CANCER METASTATIC TO MEDIASTINUM (HCC): ICD-10-CM

## 2024-07-02 DIAGNOSIS — R64 CACHEXIA (HCC): ICD-10-CM

## 2024-07-02 DIAGNOSIS — M81.0 SENILE OSTEOPOROSIS: ICD-10-CM

## 2024-07-02 DIAGNOSIS — Z00.00 ROUTINE PHYSICAL EXAMINATION: Primary | ICD-10-CM

## 2024-07-02 DIAGNOSIS — R26.81 UNSTABLE GAIT: ICD-10-CM

## 2024-07-02 DIAGNOSIS — C78.1 NON-SMALL CELL LUNG CANCER METASTATIC TO MEDIASTINUM (HCC): ICD-10-CM

## 2024-07-02 PROBLEM — R93.89 ABNORMAL CT OF THE CHEST: Status: RESOLVED | Noted: 2020-11-11 | Resolved: 2024-07-02

## 2024-07-02 PROBLEM — E87.1 HYPONATREMIA: Status: RESOLVED | Noted: 2023-10-17 | Resolved: 2024-07-02

## 2024-07-02 NOTE — PATIENT INSTRUCTIONS
I have ordered blood tests for you    Please schedule your CT of the head and try to wear some shoes that are more sturdy.  Lora, [employer]ics and new balance are some good brands to consider.  If you have any new symptoms then please let me know    Continue follow-up with your specialist    You received the pneumonia vaccine today and I highly recommend getting the shingles vaccine through the pharmacy.  It is a 2 dose series.  Since you have had chickenpox I highly recommend to get back as you can get the shingles which is a painful disease.    We removed your earwax today and let me know if you have any symptoms from it    See you back in 1 year or sooner if you need anything

## 2024-07-02 NOTE — PROGRESS NOTES
Patient Office Visit    ASSESSMENT AND PLAN:   1. Routine physical examination  Note: Please take 2000 IU of vitamin D daily for life to keep your bones strong.  Pneumonia vaccine provided today.  Recommended to get the shingles vaccine through the pharmacy.  Patient states that her oncologist is managing her mammogram and colonoscopy and she is declining for now.  Ear irrigation performed by MA today  - TSH W Reflex To Free T4 [E]; Future    2. Non-small cell lung cancer metastatic to mediastinum (HCC)  Note: Is being followed by her oncologist.  She is no longer on any chemotherapy given the side effects she had.    3. Senile osteoporosis  Note: Recently started alendronate.  Being followed by her rheumatologist    4. Mixed hyperlipidemia  Note: Patient has declined statins before but she would like to just recheck her labs  - Lipid Panel; Future    5. Inflammatory polyarthritis (HCC)  Note: Continue follow-up with rheumatologist and is on leflunomide    6. Cachexia (HCC)  Note: Advised to increase more protein shakes    7. Unstable gait  Note: Given cancer will obtain a CT of the head.  Also advised to get shoes with sturdy soles to prevent slippage  - CT BRAIN OR HEAD (09578); Future    Return to clinic yearly as she sees her specialists  She declines to get a POA/living will forms but advised to discuss with her  so he is aware of her wishes      Patient/Caregiver Education: Patient/Caregiver Education: There are no barriers to learning. Medical education done. Outcome: Patient verbalizes understanding. Patient is notified to call with any questions, complications, allergies, or worsening or changing symptoms.  Patient is to call with any side effects or complications from the treatments as a result of today.      Reviewed Past Medical History and   Patient Active Problem List   Diagnosis    Palpitation    Non-small cell lung cancer metastatic to mediastinum (HCC)    Inflammatory polyarthritis (HCC)     History of immunotherapy    Cachexia (HCC)    History of pneumothorax    Senile osteoporosis       Orders Placed This Encounter   Procedures    Lipid Panel     Standing Status:   Future     Standing Expiration Date:   7/2/2025    TSH W Reflex To Free T4 [E]     Standing Status:   Future     Standing Expiration Date:   7/2/2025     Order Specific Question:   Release to patient     Answer:   Immediate    Prevnar 20 (PCV20) [06869]     Requested Prescriptions      No prescriptions requested or ordered in this encounter         Mirtha Finley DO  CC:  Chief Complaint   Patient presents with    Physical         HPI:   Tatyana Christie is a 68-year-old female who presents for routine physical and to discuss the following concerns    Unstable gait: For the past 3 to 4 weeks she feels that she is going to slip.  It does not happen if the road is gravel but it generally happens when she is in the store or on a slippery surface.  She thought it was due to her sketchers so she switched it to crocs which helps a little bit but she still feels sleepy.  She has chronic back pain which has not worsened from before.  She has chronic headaches which has not worsened from before.  She denies any other focal deficits.  She denies any falls.  Lung cancer/arthritis: Continue on her medicine and staying off chemotherapy    Past Medical History:    Anesthesia complication    stated they had a hard time waking pt up after ankle surgery     Arrhythmia    Arthritis    Back problem    Cancer (HCC)    Cataract    Esophageal reflux    Hyperlipidemia    Lung cancer (HCC)    Adenocarcinoma    Osteoarthritis    Pneumothorax, right    Shortness of breath    Visual impairment    contacts and glasses       Past Surgical History:   Procedure Laterality Date    Cataract Right 05/2023    Cataract Left 06/2023    Fracture surgery Right     right ankle fracture surgery with pins and plates     Other surgical history      laser surgery on retina        Social History:  Social History     Socioeconomic History    Marital status:    Tobacco Use    Smoking status: Former     Current packs/day: 0.00     Average packs/day: 1.6 packs/day for 50.7 years (80.0 ttl pk-yrs)     Types: Cigarettes     Start date: 1968     Quit date: 2010     Years since quittin.7    Smokeless tobacco: Never   Vaping Use    Vaping status: Never Used   Substance and Sexual Activity    Alcohol use: Not Currently    Drug use: Never   Other Topics Concern    Caffeine Concern Yes    Exercise No    Seat Belt Yes    Special Diet Yes    Stress Concern No    Weight Concern Yes     Family History:  Family History   Problem Relation Age of Onset    Heart Disorder Mother     Dementia Mother     Stroke Mother     Diabetes Father      Allergies:  No Known Allergies  Current Meds:  Current Outpatient Medications on File Prior to Visit   Medication Sig Dispense Refill    predniSONE 20 MG Oral Tab Take 1 tablet (20 mg total) by mouth daily. 30 tablet 0    alendronate 70 MG Oral Tab Take 1 tablet (70 mg total) by mouth every 7 days. 13 tablet 3    leflunomide 20 MG Oral Tab Take 1 tablet (20 mg total) by mouth daily. 90 tablet 0    betamethasone dipropionate 0.05 % External Cream Apply 1 Application topically 2 (two) times daily. 45 g 1    Acetaminophen (TYLENOL ARTHRITIS PAIN OR) Take by mouth.      Nettle, Urtica Dioica, (NETTLE LEAF OR) Take by mouth.      Brit, Zingiber officinalis, (BRIT OR) Take by mouth. Oral brit supplement 1x daily      Turmeric (QC TUMERIC COMPLEX OR) Take by mouth.      Multiple Vitamins-Minerals (MULTI-VITAMIN/MINERALS) Oral Tab Take 1 tablet by mouth daily.       No current facility-administered medications on file prior to visit.         REVIEW OF SYSTEMS   Constitutional: Has decreased appetite  HENT: normal sinuses and no mouth issues   Eyes: . normal vision no eye pain   Respiratory: normal respirations no cough   Cardiovascular: no CP, or  palpitations   Gastrointestinal: normal bowels and no abd pains   Genitourinary:  normal urination no hematuria, no frequency   Musculoskeletal: Has chronic pain  Skin: no rashes or skin lesions that are new   Neurological: Has unsteady gait  Hematological:  no bruises or bleeding   Psychiatric/Behavioral: normal mood no anxiety normal behavior     /72 (BP Location: Right arm, Patient Position: Sitting, Cuff Size: adult)   Pulse 64   Temp 98.1 °F (36.7 °C) (Temporal)   Resp 14   Ht 5' 4\" (1.626 m)   Wt 105 lb (47.6 kg)   SpO2 97%   BMI 18.02 kg/m²     PHYSICAL EXAM:   Constitutional: Vital signs reviewed as noted, thin and frail appearing in no acute distress.   HENT: NCAT, right cerumen impaction, left ear canal is normal  Eyes: pupils reactive bilaterally  Neck: No thyroidmegaly  Cardiovascular: nl s1 s2 no m/r/g  Pulmonary/Chest: CTA bilaterally with no wheezes  Abdominal: Soft NT normal Bowel sounds  Extremities: no pedal edema   Neurological:  no weakness in UE and LE, reflexes are normal, normal gait currently  Skin: no rashes or bruises on visualized skin, not undressed   Psychiatric:normal mood

## 2024-08-03 ENCOUNTER — PATIENT MESSAGE (OUTPATIENT)
Dept: RHEUMATOLOGY | Facility: CLINIC | Age: 69
End: 2024-08-03

## 2024-08-03 DIAGNOSIS — M06.00 SERONEGATIVE RHEUMATOID ARTHRITIS (HCC): Primary | ICD-10-CM

## 2024-08-03 DIAGNOSIS — M06.00 SERONEGATIVE RHEUMATOID ARTHRITIS (HCC): ICD-10-CM

## 2024-08-03 DIAGNOSIS — M06.4 INFLAMMATORY POLYARTHRITIS (HCC): ICD-10-CM

## 2024-08-03 DIAGNOSIS — M25.461 EFFUSION OF RIGHT KNEE: ICD-10-CM

## 2024-08-05 RX ORDER — LEFLUNOMIDE 20 MG/1
20 TABLET ORAL DAILY
Qty: 90 TABLET | Refills: 0 | Status: SHIPPED | OUTPATIENT
Start: 2024-08-05

## 2024-08-05 RX ORDER — PREDNISONE 20 MG/1
20 TABLET ORAL DAILY
Qty: 30 TABLET | Refills: 0 | OUTPATIENT
Start: 2024-08-05

## 2024-08-05 RX ORDER — PREDNISONE 5 MG/1
TABLET ORAL
Qty: 42 TABLET | Refills: 0 | Status: SHIPPED | OUTPATIENT
Start: 2024-08-05 | End: 2024-08-25

## 2024-08-05 NOTE — TELEPHONE ENCOUNTER
From: Tatyana Christie  To: Negro Carlson  Sent: 8/3/2024 11:54 AM CDT  Subject: Ultrasound Treatments    Hi Dr Carlson,     I am sorry to bother you on the weekend but I am having severe pain in my neck, L shoulder, back and R knee. It has also filled up with fluid again. Should I go back on the steroids ? I put in for a refill. If so, when I am done can I try ultrasound treatments for pain management? I would need an order.     Thanks,  Tatyana

## 2024-08-05 NOTE — TELEPHONE ENCOUNTER
LOV: 6/17/2024    RTC: 4 months   Future Appointments   Date Time Provider Department Center   8/9/2024  9:00 AM PF CT RM1 PF CT [place]   8/12/2024 10:00 AM Vishnu Cuenca MD  [facility]   10/21/2024 10:15 AM Negro Carlson MD [id number]   LABS:   Component      Latest Ref Rng 6/17/2024   BODY FLUID COLOR Yellow    BODY FLUID CLARITY Cloudy    WBC, Synovial Fluid      0 - 200 /mm3 18,280 (H)    RBC SYNOVIAL FLUID      <1 /mm3 <3,000 (H)    SOURCE CRYSTALS RIGHT KNEE    CRYSTALS      Negative  Negative    NEUTROPHIL SYNOVIAL FLUID      % 91    LYMPH SYNOVIAL FLUID      % 4    MON/MAC/HIST SYNOVIAL FLUID      % 5    EOSINOPHIL SYNOVIAL FLUID      % 0    BASOPHIL SYNOVIAL FLUID      % 0    TOTAL CELLS COUNTED 100    PATH COMMENT No crystals identified.…       Legend:  (H) High    LAST REFILL:   PREDNISONE: 8/5/2024, Quantity 30 tablets, Refills 0  LEFLUNOMIDE: 5/6/2024, Quantity 90 tablets, Refills 0    Dr Carlson-- orders pending, approve if agreeable.

## 2024-08-09 ENCOUNTER — HOSPITAL ENCOUNTER (OUTPATIENT)
Dept: CT IMAGING | Age: 69
Discharge: HOME | End: 2024-08-09
Attending: INTERNAL MEDICINE
Payer: MEDICARE

## 2024-08-09 DIAGNOSIS — C34.90 PRIMARY MALIGNANT NEOPLASM OF LUNG METASTATIC TO OTHER SITE, UNSPECIFIED LATERALITY (HCC): ICD-10-CM

## 2024-08-09 PROCEDURE — 74177 CT ABD & PELVIS W/CONTRAST: CPT | Performed by: INTERNAL MEDICINE

## 2024-08-09 PROCEDURE — 71260 CT THORAX DX C+: CPT | Performed by: INTERNAL MEDICINE

## 2024-08-12 ENCOUNTER — OFFICE VISIT (OUTPATIENT)
Dept: HEMATOLOGY/ONCOLOGY | Facility: HOSPITAL | Age: 69
End: 2024-08-12
Attending: INTERNAL MEDICINE
Payer: MEDICARE

## 2024-08-12 VITALS
HEART RATE: 73 BPM | WEIGHT: 107 LBS | HEIGHT: 64.02 IN | OXYGEN SATURATION: 96 % | TEMPERATURE: 98 F | RESPIRATION RATE: 16 BRPM | BODY MASS INDEX: 18.27 KG/M2 | DIASTOLIC BLOOD PRESSURE: 76 MMHG | SYSTOLIC BLOOD PRESSURE: 132 MMHG

## 2024-08-12 DIAGNOSIS — M19.90 INFLAMMATORY ARTHRITIS: ICD-10-CM

## 2024-08-12 DIAGNOSIS — C34.90 PRIMARY MALIGNANT NEOPLASM OF LUNG METASTATIC TO OTHER SITE, UNSPECIFIED LATERALITY (HCC): Primary | ICD-10-CM

## 2024-08-12 PROCEDURE — 85652 RBC SED RATE AUTOMATED: CPT | Performed by: INTERNAL MEDICINE

## 2024-08-12 PROCEDURE — 80053 COMPREHEN METABOLIC PANEL: CPT | Performed by: INTERNAL MEDICINE

## 2024-08-12 PROCEDURE — 85025 COMPLETE CBC W/AUTO DIFF WBC: CPT | Performed by: INTERNAL MEDICINE

## 2024-08-12 PROCEDURE — 86140 C-REACTIVE PROTEIN: CPT | Performed by: INTERNAL MEDICINE

## 2024-08-12 PROCEDURE — 99214 OFFICE O/P EST MOD 30 MIN: CPT | Performed by: INTERNAL MEDICINE

## 2024-08-12 NOTE — PROGRESS NOTES
Cancer Center Progress Note  Patient Name: Tatyana Christie   YOB: 1955   Medical Record Number: [id number]   CSN: [id number]   Attending Physician: Vishnu Cuenca M.D.       Date of Visit: 8/12/2024       Chief Complaint:  Chief Complaint   Patient presents with    Follow - Up        Oncologic History:  Tatyana Christie is a 68 year old female  referred by Dr. Kaur for evaluation and treatment of her lung cancer.  The patient reports that she was in her usual state of health until July, 2020, when she contracted COVID19. She reports that she recovered, but has had intermittent dizziness and palpitations since then.  The workup included a CXR that revealed abnormalities of the RUL and LLL.  This prompted CT that revealed a RUL mass and a LLL mass.  Subsequent PET scan confirmed PET avid masses in the RUL and LLL, as well as a positive pre-carinal LN. She underwent a CT guided biopsy of the RUL mass that revealed adenocarcinoma consistent with a lung primary.  EGFR and ALK were negative, ROS-1 was equivocal, and PDL-1 was 11-20%.  She suffered a pneumothorax as a result of the biopsy and was hospitalized with a pigtail catheter placement.   She repors that an echo was normal, but she continues to complain of intermittent palpitations and dizziness. She has been referred to cardiology for evaluation.   She has no CP or SOB. No palpable LAD or masses.  No F/C/NS.  She has a significant smoking history.        She underwent MRI brain that was negative for metastases. Repeat CT of the chest revealed progression of the RUL mass, LLL mass, and adenopathy, but no new lesions.    She underwent navigational bronchoscopy with EBUS guided biopsy of a 4R node and the LLL mass. She tolerated the procedure well.  The jeramie biopsy was positive for NSCLCa. The LLL mass was read as normal lung tissue.   Her case was reviewed at the [facility] Thor[facility] tumor board, where CT guided biopsy of the LLL mass was  recommended.   The patient steadfastly refused to consider any further biopsy or workup.  After discussing treatment options for presumed stage IV lung cancer, we settled on combined immunotherapy.  She developed severe arthritis, chronic fever, and rash that prompted holding the immunotherapy.     Symptoms were somewhat relieved with steroids.  She was seen by rheumatology and started on Leflunamide.     History of Present Illness:  Pt is here for follow up. She reports some pain in her knees, otherwise OK.   No F/C/NS. No dyspnea.    Performance Status:  ECOG 1    Past Medical History:  Past Medical History:    Anesthesia complication    stated they had a hard time waking pt up after ankle surgery     Arrhythmia    Arthritis    Back problem    Cancer (HCC)    Cataract    Esophageal reflux    Hyperlipidemia    Lung cancer (HCC)    Adenocarcinoma    Osteoarthritis    Pneumothorax, right    Shortness of breath    Visual impairment    contacts and glasses       Past Surgical History:  Past Surgical History:   Procedure Laterality Date    Cataract Right 2023    Cataract Left 2023    Fracture surgery Right     right ankle fracture surgery with pins and plates     Other surgical history      laser surgery on retina       Family History:  Family History   Problem Relation Age of Onset    Heart Disorder Mother     Dementia Mother     Stroke Mother     Diabetes Father        Social History:  Social History     Socioeconomic History    Marital status:      Spouse name: Not on file    Number of children: Not on file    Years of education: Not on file    Highest education level: Not on file   Occupational History    Not on file   Tobacco Use    Smoking status: Former     Current packs/day: 0.00     Average packs/day: 1.6 packs/day for 50.7 years (80.0 ttl pk-yrs)     Types: Cigarettes     Start date: 1968     Quit date: 2010     Years since quittin.8    Smokeless tobacco: Never   Vaping Use     Vaping status: Never Used   Substance and Sexual Activity    Alcohol use: Not Currently    Drug use: Never    Sexual activity: Not on file   Other Topics Concern    Caffeine Concern Yes    Exercise No    Seat Belt Yes    Special Diet Yes    Stress Concern No    Weight Concern Yes   Social History Narrative    Not on file     Social Determinants of Health     Financial Resource Strain: Not on file   Food Insecurity: Not on file   Transportation Needs: Not on file   Physical Activity: Not on file   Stress: Not on file   Social Connections: Not on file   Housing Stability: Not on file       Current Medications:    Current Outpatient Medications:     leflunomide 20 MG Oral Tab, Take 1 tablet (20 mg total) by mouth daily., Disp: 90 tablet, Rfl: 0    predniSONE 5 MG Oral Tab, Take 6 tablets (30 mg total) by mouth daily for 2 days, THEN 4 tablets (20 mg total) daily for 2 days, THEN 3 tablets (15 mg total) daily for 2 days, THEN 2 tablets (10 mg total) daily for 2 days, THEN 1 tablet (5 mg total) daily for 12 days., Disp: 42 tablet, Rfl: 0    alendronate 70 MG Oral Tab, Take 1 tablet (70 mg total) by mouth every 7 days., Disp: 13 tablet, Rfl: 3    betamethasone dipropionate 0.05 % External Cream, Apply 1 Application topically 2 (two) times daily., Disp: 45 g, Rfl: 1    Acetaminophen (TYLENOL ARTHRITIS PAIN OR), Take by mouth., Disp: , Rfl:     Nettle, Urtica Dioica, (NETTLE LEAF OR), Take by mouth., Disp: , Rfl:     Brit, Zingiber officinalis, (BRIT OR), Take by mouth. Oral brit supplement 1x daily, Disp: , Rfl:     Turmeric (QC TUMERIC COMPLEX OR), Take by mouth., Disp: , Rfl:     Multiple Vitamins-Minerals (MULTI-VITAMIN/MINERALS) Oral Tab, Take 1 tablet by mouth daily., Disp: , Rfl:     Allergies:  No Known Allergies     Review of Systems:    Constitutional No chills, night sweats, or weight loss.   Eyes No significant visual difficulties. No diplopia. No yellowing.   Hematologic/Lymphatic Normal - No easy  bruising or bleeding.  No tender or palpable lymph nodes.   Respiratory No cough or hemoptysis.   Cardiovascular No anginal chest pain, palpitations or orthopnea.   Gastrointestinal No nausea, vomiting, diarrhea, GI bleeding. NL appetite.   Genitorurinary  No hematuria, dysuria, abnormal bleeding, or incontinence.   Integumentary No yellowing of the skin   Neurologic No headache, blurred vision, and no areas of focal weakness. Normal gait.   Psychiatric No insomnia, depression, olayinka or mood swings.       Vital Signs:  /76 (BP Location: Left arm, Patient Position: Sitting, Cuff Size: adult)   Pulse 73   Temp 98 °F (36.7 °C) (Temporal)   Resp 16   Ht 1.626 m (5' 4.02\")   Wt 48.5 kg (107 lb)   SpO2 96%   BMI 18.36 kg/m²       Physical Examination:    Constitutional Normal - Mood and affect appropriate. Appears close to chronological age. Well nourished. Well developed.   Eyes Normal - Conjunctivae and sclerae are clear and without icterus. Pupils are reactive and equal.   Hematologic/Lymphatic Normal - No petechiae or purpura.  No tender or palpable lymph nodes in the cervical, supraclavicular, axillary or inguinal area.   Respiratory Normal - Lungs are clear to auscultation, no wheezing.   Cardiovascular Normal - Regular rate and rhythm, no murmurs.   Abdomen Normal - Non-tender, non-distended, no masses, ascites or hepatosplenomegaly.    Extremities No edema.    Integumentary Erythematous rash of the abdomen w/o blisters or excoriation. Improved.    Neurologic Normal - No sensory or motor deficits, normal cerebellar function, cranial nerves intact.   Psychiatric Normal - A&Ox3. Coherent speech. Verbalizes understanding of our discussions today.           Laboratory:  CBC, CMP, ESR and CRP pending for review.       Radiology:  CT C/A/P: CONCLUSION:    1. Slight increase in size of solid and cavitary components of left upper lobe nodule, suspicious for neoplasm.   2. Of the 3 opacities previously seen in  the right lung, 2 are stable in 1 is slightly smaller and less dense.   3. The findings are otherwise unchanged.   4. Stable solid left pelvic nodule is likely adnexal.  Given the patient's age and appearance, this is most consistent with ovarian fibroma and was not FDG avid on prior PET scanning.       I reviewed the images personally. The scan is essentially stable.                  Pathology:  Final Diagnosis:   Needle core biopsy of right upper lobe lung mass:  -POSITIVE for adenocarcinoma; consistent with lung primary.           Final Diagnosis:   A.  EBUS guided fine-needle aspiration of 4R lymph node:  -Adequate for evaluation.  -POSITIVE for metastatic adenocarcinoma; consistent with known lung primary.  -(See comment).    B.  EBUS guided fine-needle aspiration of left lower lobe lung mass:  -Adequate for evaluation.  -No cytologic evidence of malignancy.  -Specimen composed predominantly of benign bronchial epithelial cells.    C.  Cytospin smears and cell block from bronchoalveolar lavage, left lower lobe of lung:  -Adequate for evaluation.  -No cytologic evidence of malignancy.  -Specimen composed of benign alveolar macrophages, respiratory epithelial cells and mixed inflammatory cells.     Final Diagnosis:   Endobronchial biopsy, left lower lobe lung:   -Fragments of benign lung tissue with no significant pathologic abnormalities.   -No evidence of malignancy.          Impression and Plan:      Non-small cell lung cancer: The patient's CT demonstrates progression of the RUL and LLL lesions, concerning for both being malignant.  Her case was reviewed at the multidisciplinary thoracic tumor board, where the possibility of two curable primaries was raised. Recommendation was made for navigational bronchoscopy to biopsy the mediastinal nodes and LLL mass. The 4R LN is positive for adenocarcinoma, c/w at least stage IIIA disease. The normal pathology of the LLL is discordant with the CT and PET findings of an  enlarging PET positive mass.  We discussed that repeat biopsy was recommended.  She steadfastly refuses additional biopsies.  In the absence of the biopsy, definitively demonstrating benign etiology of the LLL, I recommend that we proceed as if this is Stage IV disease, as it appears on PET. We discussed treatment options, including palliative chemo, chemo-immunotherapy with ipi/nivo and 2 cycles of chemotherapy, or just immunotherapy with Ipi/Nivo. With PDL-1 only 11-20%, I don not recommend Keyrtuda alone.  She does not want to add the risk of chemo side effects to the risk of immunotherapy and has elected to proceed with Combined immunotherapy.  We reviewed the risks, benefits, and side effects, and she agreed to proceed. She complained of fatigue and fever that were intolerable, prompting holding therapy. Her CT, at that time, demonstrated response to therapy. She also developed a rash on her abdomen. We discussed a course of prednisone to calm the side effects, but she initially declined, in favor of CBD gummies. She eventually agreed to 50mg daily x5 days, and when she improved, a prolonged wean. She is better, but symptoms wax and wane, She is following with rheumatology. The repeat CT is stable. F/U 6 months.    Shoulder arthritis and arthralgia: Much improved. Continue to follow with rheumatology. Continue leflunomide per rheumatlogy.        Planned Follow Up:  6 months    Electronically Signed by:    Vishnu Cuenca M.D.  [facility]

## 2024-08-12 NOTE — PROGRESS NOTES
Patient here for follow-up. Had CT performed 8/9/24. Denies any new or worsening symptoms. States she feels well overall.

## 2024-08-14 ENCOUNTER — PATIENT MESSAGE (OUTPATIENT)
Dept: RHEUMATOLOGY | Facility: CLINIC | Age: 69
End: 2024-08-14

## 2024-08-14 DIAGNOSIS — M19.012 ARTHRITIS OF LEFT GLENOHUMERAL JOINT: ICD-10-CM

## 2024-08-14 DIAGNOSIS — M47.816 OSTEOARTHRITIS OF LUMBAR SPINE, UNSPECIFIED SPINAL OSTEOARTHRITIS COMPLICATION STATUS: ICD-10-CM

## 2024-08-14 DIAGNOSIS — M81.0 AGE RELATED OSTEOPOROSIS, UNSPECIFIED PATHOLOGICAL FRACTURE PRESENCE: ICD-10-CM

## 2024-08-14 DIAGNOSIS — M19.041 OSTEOARTHRITIS OF BOTH HANDS, UNSPECIFIED OSTEOARTHRITIS TYPE: ICD-10-CM

## 2024-08-14 DIAGNOSIS — M17.0 LOCALIZED OSTEOARTHRITIS OF KNEES, BILATERAL: ICD-10-CM

## 2024-08-14 DIAGNOSIS — M19.011 ARTHRITIS OF RIGHT GLENOHUMERAL JOINT: ICD-10-CM

## 2024-08-14 DIAGNOSIS — M19.042 OSTEOARTHRITIS OF BOTH HANDS, UNSPECIFIED OSTEOARTHRITIS TYPE: ICD-10-CM

## 2024-08-14 DIAGNOSIS — M06.00 SERONEGATIVE RHEUMATOID ARTHRITIS (HCC): Primary | ICD-10-CM

## 2024-08-20 ENCOUNTER — PATIENT MESSAGE (OUTPATIENT)
Dept: HEMATOLOGY/ONCOLOGY | Facility: HOSPITAL | Age: 69
End: 2024-08-20

## 2024-08-20 DIAGNOSIS — C34.90 PRIMARY MALIGNANT NEOPLASM OF LUNG METASTATIC TO OTHER SITE, UNSPECIFIED LATERALITY (HCC): Primary | ICD-10-CM

## 2024-08-20 NOTE — TELEPHONE ENCOUNTER
From: Tatyana Christie  To: Vishnu Cuenca  Sent: 8/20/2024 7:56 AM CDT  Subject: CT    Hi ,     I didn’t see an order for my next CT for Feb.       Thanks,  Tatyana

## 2024-08-21 ENCOUNTER — TELEPHONE (OUTPATIENT)
Dept: PHYSICAL THERAPY | Facility: HOSPITAL | Age: 69
End: 2024-08-21

## 2024-08-26 NOTE — PROGRESS NOTES
LOWER EXTREMITY EVALUATION:     Diagnosis:   Acute right knee pain (M25.561),  Localized osteoarthritis of knees, bilateral (M17.0), difficulty walking (R26.2) Referring Provider: Negro Carlson MD Date of Evaluation:    8/27/2024    Precautions:  Cancer, osteoporosis Next MD visit:   none scheduled  Date of Surgery: n/a  Symptom onset; chronic on-off, current exacerbation July 2024     PATIENT SUMMARY   Tatyana Christie is a 68 year old female who presents to therapy today with complaints of R knee pain, generalized, no particular area. She noted a lot of fluid in knee at least 2 years ago, was on steroids and was 'ok for a long time'. Started acting up again this July, incr fluid, difficulty putting weight on RLE, went on steroids again which has helped. She had done some gardening - pushing on shovel c knee, kneeling to weed, things she had not done in a while. R knee has buckled on her 'a few times'. Pt states ice has been helpful in the past to resolve, but has not done recently since she was on steroids No hx of falls. Pt reports N/T B foot N/T after prolonged sitting which started April 2024. Hasn't had any immunotherapy since 2021 per below. Never had any chemotherapy. \"My back is terrible, I've had a bad back forever.\"    Per review of Epic chart, pt reported normal health until kisha Covid in July 2020. She subsequently had lung abnormalities and dx lung CA.  After discussing treatment options for presumed stage IV lung cancer, she received combined immunotherapy, but development of severe arthritis, chronic fever, and rash, prompted holding the immunotherapy. Symptoms were somewhat relieved with steroids. Also of note, she has had intermittent c/o palpations and dizziness c normal echo and has been referred to cardiology to evaluate.    Pt describes pain level at best 0/10, at worst 8/10.   Current functional limitations include pain/difficulty c stairs, decr walking tolerance.     Tatyana  describes prior level of function minimal symptoms c walking, gardening. Pt goals include resolve pain/swelling, incr walking tolerance, stairs, gardening.  Past medical history was reviewed with Tatyana. Significant findings include non-small cell lung CA to mediastinum, inflammatory polyarthritis, cachexia, osteoporosis, hyperlipidemia.    ASSESSMENT  Tatyana presents to physical therapy evaluation with primary c/o R knee pain. The results of the objective tests and measures show painful R knee ROM, painfully decr RLE strength, impaired gait, genu valgum, swelling.  Functional deficits include but are not limited to decr walking tolerance, stairs, gardening.  Signs and symptoms are consistent with rehab diagnosis, etiology of repeated swelling unknown if systemic or knee possible internal derangement. Pt and PT discussed evaluation findings, pathology, POC and HEP.  Pt voiced understanding and performs HEP correctly without reported pain. Skilled Physical Therapy is medically necessary to address the above impairments and reach functional goals.     OBJECTIVE:   Observation: visible swelling superior to patella and medial knee  Palpation: TTP medial joint line R knee  Sensation: no N/T during evaluation    AROM: (* denotes performed with pain)  @eval 8-27-24   Knee Flexion: R 125*; L 142  Knee Extension: R +4hyperext; L +5hyperext  Hip ER: R 40; L 40  Hip IR: R 30; L 30     Accessory motion: decr R patellar mobility    Flexibility:  Hamstrings: R 50; L 50    Strength/MMT: (* denotes performed with pain)  @eval 8-27-24   Hip Flexion: R 4/5; L 4/5  quad: R 4*/5; L 4+/5  hamstring: R 4*/5; L 4+/5  Hip Abd-hooklying: R 4-/5; L 4-/5  Hip ER: R 3*/5; L 3+/5  Hip IR: R 3*/5; L 3+/5     Other:   Positioning: pt declines to lay on side 2o shoulder pain    Gait: pt ambulates on level ground with  R genu valgus. No assistive device, mild decr stance time RLE.  Balance: SLS: R 1 sec* ; L 3 sec. Pelvic drop noted B, incr R genu  claire.    Imagin2021 LUMBAR MRI  FINDINGS: Patient motion noted.    S-shaped curvature of the thoracolumbar spine.  There is normal lumbar lordosis with trace retrolisthesis of L2 on L3 by 2 mm.  Vertebral body heights are well-maintained.  Mild degenerative disc space loss, endplate spurring, disc desiccation, and   degenerative endplate marrow signal changes throughout the lumbar spine.  A few scattered Schmorl's nodes are noted.  No focal worrisome marrow signal abnormality is seen.   The distal spinal cord and conus medullaris have a normal signal and morphology.  The conus medullaris terminates at the upper L2 level.  The roots of the cauda equina are unremarkable.  Tarlov cysts along the S2 segment of the sacrum.  The paraspinal   soft tissues are unremarkable.   T12-L1:  Mild diffuse disc bulge with a small superimposed right paracentral disc protrusion.  Mild facet arthropathy. There is no significant spinal canal or neural foraminal stenosis.   L1-2:  Diffuse disc bulge with endplate osteophytes.  Mild facet arthropathy with thickening of ligamentum flavum. There is no significant spinal canal or neural foraminal stenosis.   L2-3:  Diffuse disc bulge with endplate osteophytes and a superimposed left foraminal disc protrusion.  Mild facet arthropathy. There is no significant spinal canal or neural foraminal stenosis.   L3-4:  Diffuse disc bulge with mild facet arthropathy and thickening of ligamentum flavum. There is no significant spinal canal or neural foraminal stenosis.   L4-5:  Diffuse disc bulge with endplate osteophytes.  Mild facet arthropathy with thickening of ligamentum flavum.  No significant spinal canal stenosis.  Mild left neural foraminal stenosis.   L5-S1:  Mild diffuse disc bulge with endplate osteophytes.  Mild facet arthropathy.  No significant spinal canal stenosis.  Mild right neural foraminal stenosis.   CONCLUSION:    1. Multilevel degenerative changes lumbar spine as above  without significant spinal canal stenosis at any level.  S-shaped curvature of the thoracolumbar spine noted.   2. Mild left neural foraminal stenosis at L4-5.  Mild right neural foraminal stenosis at L5-S1.   3. Mild facet arthropathy throughout the lumbar spine.     Today’s Treatment and Response:   Pt education was provided on exam findings, treatment diagnosis, treatment plan, expectations, and prognosis. Pt was also provided recommendations for modalities as needed [ice/heat], importance of remaining active, and unable to offer phonotherapy tx 2o lack of prescription to administer .  Patient was instructed in and issued a HEP for:   Access Code: [email or web address] ; URL: [email or web address] ; Prepared by: Jena Aguila  Date: 08/27/2024  Exercises  - Supine Hamstring Stretch with Strap  - 2 x daily - 3 reps - 15-20 hold  - Supine Quad Set  - 2 x daily - 2 sets - 10 reps  - Supine Active Straight Leg Raise  - 2 x daily - 2 sets - 10 reps  - Standing Gastroc Stretch at Counter  - 2 x daily - 3 reps - 15-20 hold  - Standing Hip Abduction with Counter Support  - 2 x daily - 2 sets - 20 reps    Charges: PT Eval Low Complexity, therex-1      Total Timed Treatment: 15 min     Total Treatment Time: 40 min     Based on clinical rationale and outcome measures, this evaluation involved Low Complexity decision making.  PLAN OF CARE:    Goals: (to be met in 8 visits)  Consistently decr pain R knee < or = 3/10 intermittent for incr QOL and activity tolerance  Overall incr in function as indicated by incr LEFS at least 20 pts  Pt able to stand/walk at least 30 min bouts consistently c little to no pain for incr community mobility  Painfree AROM R knee to indicate decr irritability of pain  Incr RLE MMT at least 1/2 grade painfree for incr mm support for WB activities  Pt able to ascend/descend stairs reciprocally c good form and control, no genu valgus, no pain for incr functional strength  Indep HEP to promote  cont progress toward functional goals    Frequency / Duration: Patient will be seen for 2 x/week or a total of 8 visits over a 90 day period. Treatment will include: Manual Therapy, Neuromuscular Re-education, Therapeutic Exercise, and Home Exercise Program instruction    Education or treatment limitation:  PMHx  Rehab Potential:good    LEFS Score  LEFS Score: 37.5 % (8/22/2024  6:25 AM)      Patient/Family/Caregiver was advised of these findings, precautions, and treatment options and has agreed to actively participate in planning and for this course of care.    Thank you for your referral. Please co-sign or sign and return this letter via fax as soon as possible to [phone]. If you have any questions, please contact me at Dept: [phone]    Sincerely,  Electronically signed by therapist: Jena Aguila PT  Physician's certification required: Yes  I certify the need for these services furnished under this plan of treatment and while under my care.    X___________________________________________________ Date____________________    Certification From: 8/27/2024  To:11/25/2024

## 2024-08-27 ENCOUNTER — OFFICE VISIT (OUTPATIENT)
Dept: PHYSICAL THERAPY | Age: 69
End: 2024-08-27
Attending: INTERNAL MEDICINE
Payer: MEDICARE

## 2024-08-27 DIAGNOSIS — M25.561 ACUTE PAIN OF RIGHT KNEE: Primary | ICD-10-CM

## 2024-08-27 DIAGNOSIS — M17.0 PRIMARY OSTEOARTHRITIS OF BOTH KNEES: ICD-10-CM

## 2024-08-27 DIAGNOSIS — R26.2 DIFFICULTY WALKING: ICD-10-CM

## 2024-08-27 PROCEDURE — 97161 PT EVAL LOW COMPLEX 20 MIN: CPT

## 2024-08-27 PROCEDURE — 97110 THERAPEUTIC EXERCISES: CPT

## 2024-08-28 NOTE — PROGRESS NOTES
PT DAILY NOTE  Diagnosis:   Acute right knee pain (M25.561),  Localized osteoarthritis of knees, bilateral (M17.0), difficulty walking (R26.2) Referring Provider: Negro Carlson MD Date of Evaluation:    8/27/2024    Precautions:  Cancer, osteoporosis Next MD visit:   none scheduled  Date of Surgery: n/a  Symptom onset; chronic on-off, current exacerbation July 2024     Insurance Primary/Secondary: MEDICARE     Visit 2 of 8   Date POC Expires: 11-25-24       Subjective: Pt states maybe a little more pain R knee when walking yesterday for about an hour.  Pain:      4-5/10   @eval: Tatyana Christie is a 68 year old female who presents to therapy today with complaints of R knee pain, generalized, no particular area. She noted a lot of fluid in knee at least 2 years ago, was on steroids and was 'ok for a long time'. Started acting up again this July, incr fluid, difficulty putting weight on RLE, went on steroids again which has helped. She had done some gardening - pushing on shovel c knee, kneeling to weed, things she had not done in a while. R knee has buckled on her 'a few times'. Pt states ice has been helpful in the past to resolve, but has not done recently since she was on steroids No hx of falls. Pt reports N/T B foot N/T after prolonged sitting which started April 2024. Hasn't had any immunotherapy since 2021 per below. Never had any chemotherapy. \"My back is terrible, I've had a bad back forever.\"  Per review of Epic chart, pt reported normal health until kisha Covid in July 2020. She subsequently had lung abnormalities and dx lung CA.  After discussing treatment options for presumed stage IV lung cancer, she received combined immunotherapy, but development of severe arthritis, chronic fever, and rash, prompted holding the immunotherapy. Symptoms were somewhat relieved with steroids. Also of note, she has had intermittent c/o palpations and dizziness c normal echo and has been referred to cardiology to  evaluate.  Pt describes pain level at best 0/10, at worst 8/10.   Current functional limitations include pain/difficulty c stairs, decr walking tolerance.   Tatyana describes prior level of function minimal symptoms c walking, gardening. Pt goals include resolve pain/swelling, incr walking tolerance, stairs, gardening.  Past medical history was reviewed with Tatyana. Significant findings include non-small cell lung CA to mediastinum, inflammatory polyarthritis, cachexia, osteoporosis, hyperlipidemia.    Objective:   Attempted bridges but held 2o posterior R knee pain    Imagin2021 LUMBAR MRI  FINDINGS: Patient motion noted.    S-shaped curvature of the thoracolumbar spine.  There is normal lumbar lordosis with trace retrolisthesis of L2 on L3 by 2 mm.  Vertebral body heights are well-maintained.  Mild degenerative disc space loss, endplate spurring, disc desiccation, and   degenerative endplate marrow signal changes throughout the lumbar spine.  A few scattered Schmorl's nodes are noted.  No focal worrisome marrow signal abnormality is seen.   The distal spinal cord and conus medullaris have a normal signal and morphology.  The conus medullaris terminates at the upper L2 level.  The roots of the cauda equina are unremarkable.  Tarlov cysts along the S2 segment of the sacrum.  The paraspinal   soft tissues are unremarkable.   T12-L1:  Mild diffuse disc bulge with a small superimposed right paracentral disc protrusion.  Mild facet arthropathy. There is no significant spinal canal or neural foraminal stenosis.   L1-2:  Diffuse disc bulge with endplate osteophytes.  Mild facet arthropathy with thickening of ligamentum flavum. There is no significant spinal canal or neural foraminal stenosis.   L2-3:  Diffuse disc bulge with endplate osteophytes and a superimposed left foraminal disc protrusion.  Mild facet arthropathy. There is no significant spinal canal or neural foraminal stenosis.   L3-4:  Diffuse disc bulge  with mild facet arthropathy and thickening of ligamentum flavum. There is no significant spinal canal or neural foraminal stenosis.   L4-5:  Diffuse disc bulge with endplate osteophytes.  Mild facet arthropathy with thickening of ligamentum flavum.  No significant spinal canal stenosis.  Mild left neural foraminal stenosis.   L5-S1:  Mild diffuse disc bulge with endplate osteophytes.  Mild facet arthropathy.  No significant spinal canal stenosis.  Mild right neural foraminal stenosis.   CONCLUSION:    1. Multilevel degenerative changes lumbar spine as above without significant spinal canal stenosis at any level.  S-shaped curvature of the thoracolumbar spine noted.   2. Mild left neural foraminal stenosis at L4-5.  Mild right neural foraminal stenosis at L5-S1.   3. Mild facet arthropathy throughout the lumbar spine.     Treatment:  (\"NP\" indicates Not Performed this date)  Manual Therapy:  R patellar mobs med/sup gr 2  Retrograde STM anterior R knee for swelling    Neuromuscular Re-education:    Therapeutic Exercise: Eval 8-27-24 8-29-24   NuStep for CKC strengthening  5min   Supine R HS stretch c strap 20\"x3  20\"x3   R quad set c TR 5\"x10  5\"x20   R SLR 1x10 2x10    Hooklying B hip add vs ball  5\"x20   Hooklying B hip ER  R5\"x20   Seated R HS curl vs TB  R2x10   Standing R gastroc stretch 20\"x3 20\"x3    Standing hip abd R,L x10ea 2x10ea    Standing hip ext R,L   x20ea   sidestepping   2min   1/2 FR balance   2min CGA    alt 4\" taps    1min            future visits: [facility]    HEP: Access Code: [email or web address] ; URL: [email or web address] ; Prepared by: Jena Aguila  Date: 08/27/2024  Exercises  - Supine Hamstring Stretch with Strap  - 2 x daily - 3 reps - 15-20 hold  - Supine Quad Set  - 2 x daily - 2 sets - 10 reps  - Supine Active Straight Leg Raise  - 2 x daily - 2 sets - 10 reps  - Standing Gastroc Stretch at Counter  - 2 x daily - 3 reps - 15-20 hold  - Standing Hip Abduction with Counter Support   - 2 x daily - 2 sets - 20 reps    Assessment/Plan: Pt tolerated several new ex's to promote RLE strength and balance; x for bridges which she stated caused posterior knee pain, so discontinued. Pt demo frequent UE contact to railing c balance activities 2o decr proprioception. Continue to address deficits to work toward PLOF and set goals.    PLAN OF CARE:    Goals: (to be met in 8 visits)  Consistently decr pain R knee < or = 3/10 intermittent for incr QOL and activity tolerance  Overall incr in function as indicated by incr LEFS at least 20 pts  Pt able to stand/walk at least 30 min bouts consistently c little to no pain for incr community mobility  Painfree AROM R knee to indicate decr irritability of pain  Incr RLE MMT at least 1/2 grade painfree for incr mm support for WB activities  Pt able to ascend/descend stairs reciprocally c good form and control, no genu valgus, no pain for incr functional strength  Indep HEP to promote cont progress toward functional goals    Frequency / Duration: Patient will be seen for 2 x/week or a total of 8 visits over a 90 day period.     All Treatments performed at distinct and separate times during the therapy session.  Treatment Minutes  Units charged   Manual Therapy 10 minutes 1   Therapeutic Activity 0 minutes    Neuromuscular Re-education 0 minutes    Therapeutic Exercise 30 minutes 2   Total Direct Treatment Time 40 minutes

## 2024-08-29 ENCOUNTER — OFFICE VISIT (OUTPATIENT)
Dept: PHYSICAL THERAPY | Age: 69
End: 2024-08-29
Attending: INTERNAL MEDICINE
Payer: MEDICARE

## 2024-08-29 DIAGNOSIS — M17.0 PRIMARY OSTEOARTHRITIS OF BOTH KNEES: ICD-10-CM

## 2024-08-29 DIAGNOSIS — M25.561 ACUTE PAIN OF RIGHT KNEE: Primary | ICD-10-CM

## 2024-08-29 DIAGNOSIS — R26.2 DIFFICULTY WALKING: ICD-10-CM

## 2024-08-29 PROCEDURE — 97140 MANUAL THERAPY 1/> REGIONS: CPT

## 2024-08-29 PROCEDURE — 97110 THERAPEUTIC EXERCISES: CPT

## 2024-09-12 ENCOUNTER — OFFICE VISIT (OUTPATIENT)
Dept: PHYSICAL THERAPY | Age: 69
End: 2024-09-12
Attending: INTERNAL MEDICINE
Payer: MEDICARE

## 2024-09-12 DIAGNOSIS — R26.2 DIFFICULTY WALKING: ICD-10-CM

## 2024-09-12 DIAGNOSIS — M17.0 PRIMARY OSTEOARTHRITIS OF BOTH KNEES: ICD-10-CM

## 2024-09-12 DIAGNOSIS — M25.561 ACUTE PAIN OF RIGHT KNEE: Primary | ICD-10-CM

## 2024-09-12 PROCEDURE — 97140 MANUAL THERAPY 1/> REGIONS: CPT

## 2024-09-12 PROCEDURE — 97110 THERAPEUTIC EXERCISES: CPT

## 2024-09-12 NOTE — PROGRESS NOTES
PT DAILY NOTE  Diagnosis:   Acute right knee pain (M25.561),  Localized osteoarthritis of knees, bilateral (M17.0), difficulty walking (R26.2) Referring Provider: Negro Carlson MD Date of Evaluation:    8/27/2024    Precautions:  Cancer, osteoporosis Next MD visit:   none scheduled  Date of Surgery: n/a  Symptom onset; chronic on-off, current exacerbation July 2024     Insurance Primary/Secondary: MEDICARE     Visit 3 of 8   Date POC Expires: 11-25-24       Subjective: Pt states her knee feels a little better. She is having other issues that feel worse than her knee -- prior ankle injury with hardware placement. She has so much heel pain sometimes it is hard to walk; pain can radiate to ankle. It would happen occasionally, but is getting more frequent. Thinks it is plantar fasciitis.Has been icing and elevating.  Pain:      5/10   @eval: Tatyana Christie is a 68 year old female who presents to therapy today with complaints of R knee pain, generalized, no particular area. She noted a lot of fluid in knee at least 2 years ago, was on steroids and was 'ok for a long time'. Started acting up again this July, incr fluid, difficulty putting weight on RLE, went on steroids again which has helped. She had done some gardening - pushing on shovel c knee, kneeling to weed, things she had not done in a while. R knee has buckled on her 'a few times'. Pt states ice has been helpful in the past to resolve, but has not done recently since she was on steroids No hx of falls. Pt reports N/T B foot N/T after prolonged sitting which started April 2024. Hasn't had any immunotherapy since 2021 per below. Never had any chemotherapy. \"My back is terrible, I've had a bad back forever.\"  Per review of Epic chart, pt reported normal health until kisha Covid in July 2020. She subsequently had lung abnormalities and dx lung CA.  After discussing treatment options for presumed stage IV lung cancer, she received combined immunotherapy, but  development of severe arthritis, chronic fever, and rash, prompted holding the immunotherapy. Symptoms were somewhat relieved with steroids. Also of note, she has had intermittent c/o palpations and dizziness c normal echo and has been referred to cardiology to evaluate.  Pt describes pain level at best 0/10, at worst 8/10.   Current functional limitations include pain/difficulty c stairs, decr walking tolerance.   Tatyana describes prior level of function minimal symptoms c walking, gardening. Pt goals include resolve pain/swelling, incr walking tolerance, stairs, gardening.  Past medical history was reviewed with Tatyana. Significant findings include non-small cell lung CA to mediastinum, inflammatory polyarthritis, cachexia, osteoporosis, hyperlipidemia.    Objective:   Pt ed soleus stretch, gastroc+TR to add stretch to plantar fascia, self-massage PF on tennis ball or golf ball  Pt demo B overpronation, R>L.     Imagin2021 LUMBAR MRI  FINDINGS: Patient motion noted.    S-shaped curvature of the thoracolumbar spine.  There is normal lumbar lordosis with trace retrolisthesis of L2 on L3 by 2 mm.  Vertebral body heights are well-maintained.  Mild degenerative disc space loss, endplate spurring, disc desiccation, and   degenerative endplate marrow signal changes throughout the lumbar spine.  A few scattered Schmorl's nodes are noted.  No focal worrisome marrow signal abnormality is seen.   The distal spinal cord and conus medullaris have a normal signal and morphology.  The conus medullaris terminates at the upper L2 level.  The roots of the cauda equina are unremarkable.  Tarlov cysts along the S2 segment of the sacrum.  The paraspinal   soft tissues are unremarkable.   T12-L1:  Mild diffuse disc bulge with a small superimposed right paracentral disc protrusion.  Mild facet arthropathy. There is no significant spinal canal or neural foraminal stenosis.   L1-2:  Diffuse disc bulge with endplate osteophytes.   Mild facet arthropathy with thickening of ligamentum flavum. There is no significant spinal canal or neural foraminal stenosis.   L2-3:  Diffuse disc bulge with endplate osteophytes and a superimposed left foraminal disc protrusion.  Mild facet arthropathy. There is no significant spinal canal or neural foraminal stenosis.   L3-4:  Diffuse disc bulge with mild facet arthropathy and thickening of ligamentum flavum. There is no significant spinal canal or neural foraminal stenosis.   L4-5:  Diffuse disc bulge with endplate osteophytes.  Mild facet arthropathy with thickening of ligamentum flavum.  No significant spinal canal stenosis.  Mild left neural foraminal stenosis.   L5-S1:  Mild diffuse disc bulge with endplate osteophytes.  Mild facet arthropathy.  No significant spinal canal stenosis.  Mild right neural foraminal stenosis.   CONCLUSION:    1. Multilevel degenerative changes lumbar spine as above without significant spinal canal stenosis at any level.  S-shaped curvature of the thoracolumbar spine noted.   2. Mild left neural foraminal stenosis at L4-5.  Mild right neural foraminal stenosis at L5-S1.   3. Mild facet arthropathy throughout the lumbar spine.     Treatment:  (\"NP\" indicates Not Performed this date)  Manual Therapy:  R patellar mobs med/sup gr 2  STM R plantar fascia  Retrograde STM anterior R knee for swelling-NP    Neuromuscular Re-education:    Therapeutic Exercise: [place] 8-27-24 8-29-24 9-12-24   NuStep for CKC strengthening  5min 5min   Supine R HS stretch c strap 20\"x3  20\"x3 20\"x3   R seated PF stretch into MTP extension   15\"x2   R quad set c TR 5\"x10  5\"x20    R SLR 1x10 2x10  2x10   Hooklying B hip add vs ball  5\"x20    Hooklying B hip ER  R5\"x20 R5\"x20   Seated R HS curl vs TB  R2x10 R2x10   Standing R gastroc stretch 20\"x3 20\"x3  20\"x3    Pt ed soleus stretch and gastroc+plant fascia c towel roll   2min   Standing hip abd R,L x10ea 2x10ea     Standing hip ext R,L   x20ea     sidestepping   2min 2min   1/2 FR balance   2min CGA     alt 4\" taps    1min              future visits: [facility]    HEP: Access Code: [email or web address] ; URL: [email or web address] ; Prepared by: Jena Aguila  Date: 08/27/2024  Exercises  - Supine Hamstring Stretch with Strap  - 2 x daily - 3 reps - 15-20 hold  - Supine Quad Set  - 2 x daily - 2 sets - 10 reps  - Supine Active Straight Leg Raise  - 2 x daily - 2 sets - 10 reps  - Standing Gastroc Stretch at Counter  - 2 x daily - 3 reps - 15-20 hold  - Standing Hip Abduction with Counter Support  - 2 x daily - 2 sets - 20 reps    Assessment/Plan: Pt reports significant R heel pain and previous surgery/hardware in ankle. The gastroc stretch she was already shown for knee helps; additional stretches were reviewed c pt today. In addition, pt ed arch support is needed to decrease overpronation, thus decreasing stress on plantar fascia and ankle structures/hardware. Pt verbalized understanding. Continue to progress strength and balance activities as tolerated.    PLAN OF CARE:    Goals: (to be met in 8 visits)  Consistently decr pain R knee < or = 3/10 intermittent for incr QOL and activity tolerance  Overall incr in function as indicated by incr LEFS at least 20 pts  Pt able to stand/walk at least 30 min bouts consistently c little to no pain for incr community mobility  Painfree AROM R knee to indicate decr irritability of pain  Incr RLE MMT at least 1/2 grade painfree for incr mm support for WB activities  Pt able to ascend/descend stairs reciprocally c good form and control, no genu valgus, no pain for incr functional strength  Indep HEP to promote cont progress toward functional goals    Frequency / Duration: Patient will be seen for 2 x/week or a total of 8 visits over a 90 day period.     All Treatments performed at distinct and separate times during the therapy session.  Treatment Minutes  Units charged   Manual Therapy 10 minutes 1   Therapeutic  Activity 0 minutes    Neuromuscular Re-education 0 minutes    Therapeutic Exercise 30 minutes 2   Total Direct Treatment Time 40 minutes

## 2024-09-16 ENCOUNTER — APPOINTMENT (OUTPATIENT)
Dept: PHYSICAL THERAPY | Age: 69
End: 2024-09-16
Attending: INTERNAL MEDICINE
Payer: MEDICARE

## 2024-09-17 ENCOUNTER — OFFICE VISIT (OUTPATIENT)
Dept: PHYSICAL THERAPY | Age: 69
End: 2024-09-17
Attending: INTERNAL MEDICINE
Payer: MEDICARE

## 2024-09-17 DIAGNOSIS — M17.0 PRIMARY OSTEOARTHRITIS OF BOTH KNEES: ICD-10-CM

## 2024-09-17 DIAGNOSIS — R26.2 DIFFICULTY WALKING: ICD-10-CM

## 2024-09-17 DIAGNOSIS — M25.561 ACUTE PAIN OF RIGHT KNEE: Primary | ICD-10-CM

## 2024-09-17 PROCEDURE — 97110 THERAPEUTIC EXERCISES: CPT

## 2024-09-17 PROCEDURE — 97140 MANUAL THERAPY 1/> REGIONS: CPT

## 2024-09-17 NOTE — PROGRESS NOTES
PT DAILY NOTE  Diagnosis:   Acute right knee pain (M25.561),  Localized osteoarthritis of knees, bilateral (M17.0), difficulty walking (R26.2) Referring Provider: Negro Carlson MD Date of Evaluation:    8/27/2024    Precautions:  Cancer, osteoporosis Next MD visit:   none scheduled  Date of Surgery: n/a  Symptom onset; chronic on-off, current exacerbation July 2024     Insurance Primary/Secondary: MEDICARE     Visit 4 of 8   Date POC Expires: 11-25-24       Subjective: Pt states her knee feels about the same, it depends on what she is doing. She had a hard time walking around a lot at [employer]. Pt states she got some inserts for arch support in her shoes and they rubbed and caused pain so she d /c.  Pain:      5/10   @eval: Tatyana Christie is a 68 year old female who presents to therapy today with complaints of R knee pain, generalized, no particular area. She noted a lot of fluid in knee at least 2 years ago, was on steroids and was 'ok for a long time'. Started acting up again this July, incr fluid, difficulty putting weight on RLE, went on steroids again which has helped. She had done some gardening - pushing on shovel c knee, kneeling to weed, things she had not done in a while. R knee has buckled on her 'a few times'. Pt states ice has been helpful in the past to resolve, but has not done recently since she was on steroids No hx of falls. Pt reports N/T B foot N/T after prolonged sitting which started April 2024. Hasn't had any immunotherapy since 2021 per below. Never had any chemotherapy. \"My back is terrible, I've had a bad back forever.\"  Per review of Epic chart, pt reported normal health until kisha Covid in July 2020. She subsequently had lung abnormalities and dx lung CA.  After discussing treatment options for presumed stage IV lung cancer, she received combined immunotherapy, but development of severe arthritis, chronic fever, and rash, prompted holding the immunotherapy. Symptoms were somewhat  relieved with steroids. Also of note, she has had intermittent c/o palpations and dizziness c normal echo and has been referred to cardiology to evaluate.  Pt describes pain level at best 0/10, at worst 8/10.   Current functional limitations include pain/difficulty c stairs, decr walking tolerance.   Tatyana describes prior level of function minimal symptoms c walking, gardening. Pt goals include resolve pain/swelling, incr walking tolerance, stairs, gardening.  Past medical history was reviewed with Tatyana. Significant findings include non-small cell lung CA to mediastinum, inflammatory polyarthritis, cachexia, osteoporosis, hyperlipidemia.    Objective:   Pt reports posterior R knee pain c incr reps sit to stand   Pt declined to perform R SLS c L hip ext/abd 2o she believes R heel pain aggravated c FWB. However R heel pain absent c B HR  Limited resistence on leg press for tolerance    Imagin2021 LUMBAR MRI  FINDINGS: Patient motion noted.    S-shaped curvature of the thoracolumbar spine.  There is normal lumbar lordosis with trace retrolisthesis of L2 on L3 by 2 mm.  Vertebral body heights are well-maintained.  Mild degenerative disc space loss, endplate spurring, disc desiccation, and   degenerative endplate marrow signal changes throughout the lumbar spine.  A few scattered Schmorl's nodes are noted.  No focal worrisome marrow signal abnormality is seen.   The distal spinal cord and conus medullaris have a normal signal and morphology.  The conus medullaris terminates at the upper L2 level.  The roots of the cauda equina are unremarkable.  Tarlov cysts along the S2 segment of the sacrum.  The paraspinal   soft tissues are unremarkable.   T12-L1:  Mild diffuse disc bulge with a small superimposed right paracentral disc protrusion.  Mild facet arthropathy. There is no significant spinal canal or neural foraminal stenosis.   L1-2:  Diffuse disc bulge with endplate osteophytes.  Mild facet arthropathy with  thickening of ligamentum flavum. There is no significant spinal canal or neural foraminal stenosis.   L2-3:  Diffuse disc bulge with endplate osteophytes and a superimposed left foraminal disc protrusion.  Mild facet arthropathy. There is no significant spinal canal or neural foraminal stenosis.   L3-4:  Diffuse disc bulge with mild facet arthropathy and thickening of ligamentum flavum. There is no significant spinal canal or neural foraminal stenosis.   L4-5:  Diffuse disc bulge with endplate osteophytes.  Mild facet arthropathy with thickening of ligamentum flavum.  No significant spinal canal stenosis.  Mild left neural foraminal stenosis.   L5-S1:  Mild diffuse disc bulge with endplate osteophytes.  Mild facet arthropathy.  No significant spinal canal stenosis.  Mild right neural foraminal stenosis.   CONCLUSION:    1. Multilevel degenerative changes lumbar spine as above without significant spinal canal stenosis at any level.  S-shaped curvature of the thoracolumbar spine noted.   2. Mild left neural foraminal stenosis at L4-5.  Mild right neural foraminal stenosis at L5-S1.   3. Mild facet arthropathy throughout the lumbar spine.     Treatment:  (\"NP\" indicates Not Performed this date)  Manual Therapy:  R patellar mobs med/sup gr 2  Retrograde STM anterior R knee for swelling    Neuromuscular Re-education:    Therapeutic Exercise: Eval 8-27-24 8-29-24 9-12-24 9-17-24   NuStep for CKC strengthening  5min 5min 5min   Supine R HS stretch c strap 20\"x3  20\"x3 20\"x3 20\"x3   R seated PF stretch into MTP extension   15\"x2    R quad set c TR 5\"x10  5\"x20     R SLR 1x10 2x10  2x10 2x10   Hooklying B hip add vs ball  5\"x20     Hooklying B hip ER  R5\"x20 R5\"x20 R5\"x20   Seated R HS curl vs TB  R2x10 R2x10 Gx20   Standing R gastroc stretch 20\"x3 20\"x3  20\"x3  20\"x3   Pt ed soleus stretch and gastroc+plant fascia c towel roll   2min    Standing hip abd R,L x10ea 2x10ea   Rx20   Standing hip ext R,L   x20ea  Rx20    sidestepping   2min 2min 2min   1/2 FR balance   2min CGA  2'    alt 4\" taps    1min      sit to stand      2x10   B HR    x20   Standing TKE ball on wall    5\"x15 R,L   SL press on shuttle    3C 2x10 R,L                         future visits: SLS    HEP: Access Code: [id number] ; URL: [email or web address] ; Prepared by: Jena Aguila  Date: 08/27/2024  Exercises  - Supine Hamstring Stretch with Strap  - 2 x daily - 3 reps - 15-20 hold  - Supine Quad Set  - 2 x daily - 2 sets - 10 reps  - Supine Active Straight Leg Raise  - 2 x daily - 2 sets - 10 reps  - Standing Gastroc Stretch at Counter  - 2 x daily - 3 reps - 15-20 hold  - Standing Hip Abduction with Counter Support  - 2 x daily - 2 sets - 20 reps    Assessment/Plan: Pt cont to have swelling R knee, 1o visible suprapatellar. She did tolerate some new ex's today to promote incr stabilization and strength R knee. Cont to address deficits and pain control.     PLAN OF CARE:    Goals: (to be met in 8 visits)  Consistently decr pain R knee < or = 3/10 intermittent for incr QOL and activity tolerance  Overall incr in function as indicated by incr LEFS at least 20 pts  Pt able to stand/walk at least 30 min bouts consistently c little to no pain for incr community mobility  Painfree AROM R knee to indicate decr irritability of pain  Incr RLE MMT at least 1/2 grade painfree for incr mm support for WB activities  Pt able to ascend/descend stairs reciprocally c good form and control, no genu valgus, no pain for incr functional strength  Indep HEP to promote cont progress toward functional goals    Frequency / Duration: Patient will be seen for 2 x/week or a total of 8 visits over a 90 day period.     All Treatments performed at distinct and separate times during the therapy session.  Treatment Minutes  Units charged   Manual Therapy 10 minutes 1   Therapeutic Activity 0 minutes    Neuromuscular Re-education 0 minutes    Therapeutic Exercise 30 minutes  2   Total Direct Treatment Time 40 minutes

## 2024-09-18 ENCOUNTER — APPOINTMENT (OUTPATIENT)
Dept: PHYSICAL THERAPY | Age: 69
End: 2024-09-18
Attending: INTERNAL MEDICINE
Payer: MEDICARE

## 2024-09-18 NOTE — PROGRESS NOTES
PT DAILY NOTE  Diagnosis:   Acute right knee pain (M25.561),  Localized osteoarthritis of knees, bilateral (M17.0), difficulty walking (R26.2) Referring Provider: Negro Carlson MD Date of Evaluation:    8/27/2024    Precautions:  Cancer, osteoporosis Next MD visit:   10-21-24  Date of Surgery: n/a  Symptom onset; chronic on-off, current exacerbation July 2024     Insurance Primary/Secondary: MEDICARE     Visit 5 of 8   Date POC Expires: 11-25-24       Subjective: Pt states she tried to do some yardwork yesterday, squatting to weed. Her knee and back are hurting. She has been good about doing her HEP x2/day. At least an hour the first round of ex's.  Pain:      6/10   @eval: Tatyana Christie is a 68 year old female who presents to therapy today with complaints of R knee pain, generalized, no particular area. She noted a lot of fluid in knee at least 2 years ago, was on steroids and was 'ok for a long time'. Started acting up again this July, incr fluid, difficulty putting weight on RLE, went on steroids again which has helped. She had done some gardening - pushing on shovel c knee, kneeling to weed, things she had not done in a while. R knee has buckled on her 'a few times'. Pt states ice has been helpful in the past to resolve, but has not done recently since she was on steroids No hx of falls. Pt reports N/T B foot N/T after prolonged sitting which started April 2024. Hasn't had any immunotherapy since 2021 per below. Never had any chemotherapy. \"My back is terrible, I've had a bad back forever.\"  Per review of Epic chart, pt reported normal health until kisha Covid in July 2020. She subsequently had lung abnormalities and dx lung CA.  After discussing treatment options for presumed stage IV lung cancer, she received combined immunotherapy, but development of severe arthritis, chronic fever, and rash, prompted holding the immunotherapy. Symptoms were somewhat relieved with steroids. Also of note, she has had  intermittent c/o palpations and dizziness c normal echo and has been referred to cardiology to evaluate.  Pt describes pain level at best 0/10, at worst 8/10.   Current functional limitations include pain/difficulty c stairs, decr walking tolerance.   Tatyana describes prior level of function minimal symptoms c walking, gardening. Pt goals include resolve pain/swelling, incr walking tolerance, stairs, gardening.  Past medical history was reviewed with Tatyana. Significant findings include non-small cell lung CA to mediastinum, inflammatory polyarthritis, cachexia, osteoporosis, hyperlipidemia.    Objective:   See tx log    Imagin2021 LUMBAR MRI  FINDINGS: Patient motion noted.    S-shaped curvature of the thoracolumbar spine.  There is normal lumbar lordosis with trace retrolisthesis of L2 on L3 by 2 mm.  Vertebral body heights are well-maintained.  Mild degenerative disc space loss, endplate spurring, disc desiccation, and   degenerative endplate marrow signal changes throughout the lumbar spine.  A few scattered Schmorl's nodes are noted.  No focal worrisome marrow signal abnormality is seen.   The distal spinal cord and conus medullaris have a normal signal and morphology.  The conus medullaris terminates at the upper L2 level.  The roots of the cauda equina are unremarkable.  Tarlov cysts along the S2 segment of the sacrum.  The paraspinal   soft tissues are unremarkable.   T12-L1:  Mild diffuse disc bulge with a small superimposed right paracentral disc protrusion.  Mild facet arthropathy. There is no significant spinal canal or neural foraminal stenosis.   L1-2:  Diffuse disc bulge with endplate osteophytes.  Mild facet arthropathy with thickening of ligamentum flavum. There is no significant spinal canal or neural foraminal stenosis.   L2-3:  Diffuse disc bulge with endplate osteophytes and a superimposed left foraminal disc protrusion.  Mild facet arthropathy. There is no significant spinal canal or  neural foraminal stenosis.   L3-4:  Diffuse disc bulge with mild facet arthropathy and thickening of ligamentum flavum. There is no significant spinal canal or neural foraminal stenosis.   L4-5:  Diffuse disc bulge with endplate osteophytes.  Mild facet arthropathy with thickening of ligamentum flavum.  No significant spinal canal stenosis.  Mild left neural foraminal stenosis.   L5-S1:  Mild diffuse disc bulge with endplate osteophytes.  Mild facet arthropathy.  No significant spinal canal stenosis.  Mild right neural foraminal stenosis.   CONCLUSION:    1. Multilevel degenerative changes lumbar spine as above without significant spinal canal stenosis at any level.  S-shaped curvature of the thoracolumbar spine noted.   2. Mild left neural foraminal stenosis at L4-5.  Mild right neural foraminal stenosis at L5-S1.   3. Mild facet arthropathy throughout the lumbar spine.     Treatment:  (\"NP\" indicates Not Performed this date)  Manual Therapy:  R patellar mobs med/sup gr 2  Retrograde STM anterior R knee for swelling    Neuromuscular Re-education:    Therapeutic Exercise: Eval 8-27-24 8-29-24 9-12-24 9-17-24 9-19-24   NuStep for CKC strengthening  5min 5min 5min 5min   Supine R HS stretch c strap 20\"x3  20\"x3 20\"x3 20\"x3 20\"x3   R seated PF stretch into MTP extension   15\"x2     R quad set c TR 5\"x10  5\"x20      R SLR 1x10 2x10  2x10 2x10 3x10   R SAQ     5\"x10   Hooklying B hip add vs ball  5\"x20   5\"x20   Hooklying B hip ER  R5\"x20 R5\"x20 R5\"x20 G5\"x20   Seated R HS curl vs TB  R2x10 R2x10 Gx20    Standing R gastroc stretch 20\"x3 20\"x3  20\"x3  20\"x3 20\"x3   Pt ed soleus stretch and gastroc+plant fascia c towel roll   2min     Standing hip abd R,L x10ea 2x10ea   Rx20 Rx20   Standing hip ext R,L   x20ea  Rx20 Rx20   sidestepping   2min 2min 2min [id number]   1/2 FR balance   2min CGA  2' 2'    alt 4\" taps    1min       sit to stand      2x10 2x10   B HR    x20 x20   Standing TKE ball on wall    5\"x15 R,L  5\"x20R   SL press on shuttle    3C 2x10 R,L                             future visits: SLS    HEP: Access Code: [email or web address] ; URL: [email or web address] ; Prepared by: Jena Aguila  Date: 08/27/2024  Exercises  - Supine Hamstring Stretch with Strap  - 2 x daily - 3 reps - 15-20 hold  - Supine Quad Set  - 2 x daily - 2 sets - 10 reps  - Supine Active Straight Leg Raise  - 2 x daily - 2 sets - 10 reps  - Standing Gastroc Stretch at Counter  - 2 x daily - 3 reps - 15-20 hold  - Standing Hip Abduction with Counter Support  - 2 x daily - 2 sets - 20 reps    Assessment/Plan: Pt cont to have suprapatellar R Knee swelling and she reports incr pain today after 1-2 hours of weeding in garden yesterday. Adapted some ex's today to avoid further exacerbation of pain. Pt reports mild pain reduction at end of session today. Continue to address deficits and promote decr pain/swelling.    PLAN OF CARE:    Goals: (to be met in 8 visits)  Consistently decr pain R knee < or = 3/10 intermittent for incr QOL and activity tolerance  Overall incr in function as indicated by incr LEFS at least 20 pts  Pt able to stand/walk at least 30 min bouts consistently c little to no pain for incr community mobility  Painfree AROM R knee to indicate decr irritability of pain  Incr RLE MMT at least 1/2 grade painfree for incr mm support for WB activities  Pt able to ascend/descend stairs reciprocally c good form and control, no genu valgus, no pain for incr functional strength  Indep HEP to promote cont progress toward functional goals    Frequency / Duration: Patient will be seen for 2 x/week or a total of 8 visits over a 90 day period.     All Treatments performed at distinct and separate times during the therapy session.  Treatment Minutes  Units charged   Manual Therapy 15 minutes 1   Therapeutic Activity 0 minutes    Neuromuscular Re-education 0 minutes    Therapeutic Exercise 30 minutes 2   Total Direct Treatment Time 45 minutes

## 2024-09-19 ENCOUNTER — OFFICE VISIT (OUTPATIENT)
Dept: PHYSICAL THERAPY | Age: 69
End: 2024-09-19
Attending: INTERNAL MEDICINE
Payer: MEDICARE

## 2024-09-19 DIAGNOSIS — R26.2 DIFFICULTY WALKING: ICD-10-CM

## 2024-09-19 DIAGNOSIS — M25.561 ACUTE PAIN OF RIGHT KNEE: Primary | ICD-10-CM

## 2024-09-19 DIAGNOSIS — M17.0 PRIMARY OSTEOARTHRITIS OF BOTH KNEES: ICD-10-CM

## 2024-09-19 PROCEDURE — 97140 MANUAL THERAPY 1/> REGIONS: CPT

## 2024-09-19 PROCEDURE — 97110 THERAPEUTIC EXERCISES: CPT

## 2024-09-20 ENCOUNTER — APPOINTMENT (OUTPATIENT)
Dept: PHYSICAL THERAPY | Age: 69
End: 2024-09-20
Attending: INTERNAL MEDICINE
Payer: MEDICARE

## 2024-09-23 NOTE — PROGRESS NOTES
PT DAILY NOTE  Diagnosis:   Acute right knee pain (M25.561),  Localized osteoarthritis of knees, bilateral (M17.0), difficulty walking (R26.2) Referring Provider: Negro Carlson MD Date of Evaluation:    8/27/2024    Precautions:  Cancer, osteoporosis Next MD visit:   10-21-24  Date of Surgery: n/a  Symptom onset; chronic on-off for 1-2 yrs, current exacerbation July 2024     Insurance Primary/Secondary: MEDICARE     Visit 6 of 8   Date POC Expires: 11-25-24       Subjective: Pt states her knee doesn't feel too bad yesterday. 2 days ago it was horrible, she couldn't do anything at home. Her back is hurting more today.  Pain:      4/10   @eval: Tatyana Christie is a 68 year old female who presents to therapy today with complaints of R knee pain, generalized, no particular area. She noted a lot of fluid in knee at least 2 years ago, was on steroids and was 'ok for a long time'. Started acting up again this July, incr fluid, difficulty putting weight on RLE, went on steroids again which has helped. She had done some gardening - pushing on shovel c knee, kneeling to weed, things she had not done in a while. R knee has buckled on her 'a few times'. Pt states ice has been helpful in the past to resolve, but has not done recently since she was on steroids No hx of falls. Pt reports N/T B foot N/T after prolonged sitting which started April 2024. Hasn't had any immunotherapy since 2021 per below. Never had any chemotherapy. \"My back is terrible, I've had a bad back forever.\"  Per review of Epic chart, pt reported normal health until kisha Covid in July 2020. She subsequently had lung abnormalities and dx lung CA.  After discussing treatment options for presumed stage IV lung cancer, she received combined immunotherapy, but development of severe arthritis, chronic fever, and rash, prompted holding the immunotherapy. Symptoms were somewhat relieved with steroids. Also of note, she has had intermittent c/o palpations and  dizziness c normal echo and has been referred to cardiology to evaluate.  Pt describes pain level at best 0/10, at worst 8/10.   Current functional limitations include pain/difficulty c stairs, decr walking tolerance.   Tatyana describes prior level of function minimal symptoms c walking, gardening. Pt goals include resolve pain/swelling, incr walking tolerance, stairs, gardening.  Past medical history was reviewed with Tatyana. Significant findings include non-small cell lung CA to mediastinum, inflammatory polyarthritis, cachexia, osteoporosis, hyperlipidemia.    Objective:   See tx log    Imagin2021 LUMBAR MRI  FINDINGS: Patient motion noted.    S-shaped curvature of the thoracolumbar spine.  There is normal lumbar lordosis with trace retrolisthesis of L2 on L3 by 2 mm.  Vertebral body heights are well-maintained.  Mild degenerative disc space loss, endplate spurring, disc desiccation, and   degenerative endplate marrow signal changes throughout the lumbar spine.  A few scattered Schmorl's nodes are noted.  No focal worrisome marrow signal abnormality is seen.   The distal spinal cord and conus medullaris have a normal signal and morphology.  The conus medullaris terminates at the upper L2 level.  The roots of the cauda equina are unremarkable.  Tarlov cysts along the S2 segment of the sacrum.  The paraspinal   soft tissues are unremarkable.   T12-L1:  Mild diffuse disc bulge with a small superimposed right paracentral disc protrusion.  Mild facet arthropathy. There is no significant spinal canal or neural foraminal stenosis.   L1-2:  Diffuse disc bulge with endplate osteophytes.  Mild facet arthropathy with thickening of ligamentum flavum. There is no significant spinal canal or neural foraminal stenosis.   L2-3:  Diffuse disc bulge with endplate osteophytes and a superimposed left foraminal disc protrusion.  Mild facet arthropathy. There is no significant spinal canal or neural foraminal stenosis.    L3-4:  Diffuse disc bulge with mild facet arthropathy and thickening of ligamentum flavum. There is no significant spinal canal or neural foraminal stenosis.   L4-5:  Diffuse disc bulge with endplate osteophytes.  Mild facet arthropathy with thickening of ligamentum flavum.  No significant spinal canal stenosis.  Mild left neural foraminal stenosis.   L5-S1:  Mild diffuse disc bulge with endplate osteophytes.  Mild facet arthropathy.  No significant spinal canal stenosis.  Mild right neural foraminal stenosis.   CONCLUSION:    1. Multilevel degenerative changes lumbar spine as above without significant spinal canal stenosis at any level.  S-shaped curvature of the thoracolumbar spine noted.   2. Mild left neural foraminal stenosis at L4-5.  Mild right neural foraminal stenosis at L5-S1.   3. Mild facet arthropathy throughout the lumbar spine.     Treatment:  (\"NP\" indicates Not Performed this date)  Manual Therapy:  R patellar mobs med/sup gr 2  Retrograde STM anterior R knee for swelling    Neuromuscular Re-education:    Therapeutic Exercise: Eval 8-27-24 8-29-24 9-12-24 9-17-24 9-19-24 9-24-24   NuStep for CKC strengthening  5min 5min 5min 5min 5min   Supine R HS stretch c strap 20\"x3  20\"x3 20\"x3 20\"x3 20\"x3 20\"x3   R seated PF stretch into MTP extension   15\"x2      R quad set c TR 5\"x10  5\"x20       R SLR 1x10 2x10  2x10 2x10 3x10 3x10   R SAQ     5\"x10    Hooklying B hip add vs ball  5\"x20   5\"x20 5\"x20   Hooklying B hip ER  R5\"x20 R5\"x20 R5\"x20 G5\"x20 G5\"x20   Seated R HS curl vs TB  R2x10 R2x10 Gx20  Gx20   R LAQ      1#5\"x10   Standing R gastroc stretch 20\"x3 20\"x3  20\"x3  20\"x3 20\"x3 20\"x3   Pt ed soleus stretch and gastroc+plant fascia c towel roll   2min      Standing hip abd R,L x10ea 2x10ea   Rx20 Rx20 Rx20   Standing hip ext R,L   x20ea  Rx20 Rx20 Rx20   sidestepping   2min 2min 2min [id number]    1/2 FR balance   2min CGA  2' 2' 2'    alt 4\" taps    1min        sit to stand      2x10 2x10  2x10   B HR    x20 x20 x20   Standing TKE ball on wall    5\"x15 R,L 5\"x20R 5\"x20R   SL press on shuttle    3C 2x10 R,L     Balboard controlled taps ant-post      2' CGA   Alt 6\" taps      1'             future visits: SLS    HEP: Access Code: [email or web address] ; URL: [email or web address] ; Prepared by: Jena Aguila  Date: 08/27/2024  Exercises  - Supine Hamstring Stretch with Strap  - 2 x daily - 3 reps - 15-20 hold  - Supine Quad Set  - 2 x daily - 2 sets - 10 reps  - Supine Active Straight Leg Raise  - 2 x daily - 2 sets - 10 reps  - Standing Gastroc Stretch at Counter  - 2 x daily - 3 reps - 15-20 hold  - Standing Hip Abduction with Counter Support  - 2 x daily - 2 sets - 20 reps    Assessment/Plan: Pt reports decreased pain today and easier ability to achieve terminal knee extension on R, however, persistent swelling is present suprapatellar. She did tolerate addition of 2 new balance activities, but squatting/stair activities limited by knee pain.  Discussed the possibility of ortho consult if pain does not resolve to her satisfaction, given her history of recurrent swelling/pain over past 2 years. Continue to maximize RLE strength as tolerated.    PLAN OF CARE:    Goals: (to be met in 8 visits)  Consistently decr pain R knee < or = 3/10 intermittent for incr QOL and activity tolerance  Overall incr in function as indicated by incr LEFS at least 20 pts  Pt able to stand/walk at least 30 min bouts consistently c little to no pain for incr community mobility  Painfree AROM R knee to indicate decr irritability of pain  Incr RLE MMT at least 1/2 grade painfree for incr mm support for WB activities  Pt able to ascend/descend stairs reciprocally c good form and control, no genu valgus, no pain for incr functional strength  Indep HEP to promote cont progress toward functional goals    Frequency / Duration: Patient will be seen for 2 x/week or a total of 8 visits over a 90 day period.     All Treatments  performed at distinct and separate times during the therapy session.  Treatment Minutes  Units charged   Manual Therapy 12 minutes 1   Therapeutic Activity 0 minutes    Neuromuscular Re-education 0 minutes    Therapeutic Exercise 33 minutes 2   Total Direct Treatment Time 45 minutes

## 2024-09-24 ENCOUNTER — OFFICE VISIT (OUTPATIENT)
Dept: PHYSICAL THERAPY | Age: 69
End: 2024-09-24
Attending: INTERNAL MEDICINE
Payer: MEDICARE

## 2024-09-24 DIAGNOSIS — M25.561 ACUTE PAIN OF RIGHT KNEE: Primary | ICD-10-CM

## 2024-09-24 DIAGNOSIS — M17.0 PRIMARY OSTEOARTHRITIS OF BOTH KNEES: ICD-10-CM

## 2024-09-24 DIAGNOSIS — R26.2 DIFFICULTY WALKING: ICD-10-CM

## 2024-09-24 PROCEDURE — 97110 THERAPEUTIC EXERCISES: CPT

## 2024-09-24 PROCEDURE — 97140 MANUAL THERAPY 1/> REGIONS: CPT

## 2024-09-25 ENCOUNTER — APPOINTMENT (OUTPATIENT)
Dept: PHYSICAL THERAPY | Age: 69
End: 2024-09-25
Attending: INTERNAL MEDICINE
Payer: MEDICARE

## 2024-09-27 ENCOUNTER — APPOINTMENT (OUTPATIENT)
Dept: PHYSICAL THERAPY | Age: 69
End: 2024-09-27
Attending: INTERNAL MEDICINE
Payer: MEDICARE

## 2024-10-01 NOTE — PROGRESS NOTES
PT DAILY NOTE  Diagnosis:   Acute right knee pain (M25.561),  Localized osteoarthritis of knees, bilateral (M17.0), difficulty walking (R26.2) Referring Provider: Negro Carlson MD Date of Evaluation:    8/27/2024    Precautions:  Cancer, osteoporosis Next MD visit:   10-21-24  Date of Surgery: n/a  Symptom onset; chronic on-off for 1-2 yrs, current exacerbation July 2024     Insurance Primary/Secondary: MEDICARE     Visit 7 of 8   Date POC Expires: 11-25-24       Subjective: Pt states 'sometimes I feel like I'm a little better, and then sometimes I feel like I'm back to where I was.\"  She states she also thought her swelling was better but then she had 1 meal that didn't abide by her anti-inflammatory diet and it reverted back.  Pain:      6/10 c walking   @eval: Tatyana Christie is a 68 year old female who presents to therapy today with complaints of R knee pain, generalized, no particular area. She noted a lot of fluid in knee at least 2 years ago, was on steroids and was 'ok for a long time'. Started acting up again this July, incr fluid, difficulty putting weight on RLE, went on steroids again which has helped. She had done some gardening - pushing on shovel c knee, kneeling to weed, things she had not done in a while. R knee has buckled on her 'a few times'. Pt states ice has been helpful in the past to resolve, but has not done recently since she was on steroids No hx of falls. Pt reports N/T B foot N/T after prolonged sitting which started April 2024. Hasn't had any immunotherapy since 2021 per below. Never had any chemotherapy. \"My back is terrible, I've had a bad back forever.\"  Per review of Epic chart, pt reported normal health until kisha Covid in July 2020. She subsequently had lung abnormalities and dx lung CA.  After discussing treatment options for presumed stage IV lung cancer, she received combined immunotherapy, but development of severe arthritis, chronic fever, and rash, prompted holding  the immunotherapy. Symptoms were somewhat relieved with steroids. Also of note, she has had intermittent c/o palpations and dizziness c normal echo and has been referred to cardiology to evaluate.  Pt describes pain level at best 0/10, at worst 8/10.   Current functional limitations include pain/difficulty c stairs, decr walking tolerance.   Tatyana describes prior level of function minimal symptoms c walking, gardening. Pt goals include resolve pain/swelling, incr walking tolerance, stairs, gardening.  Past medical history was reviewed with Tatyana. Significant findings include non-small cell lung CA to mediastinum, inflammatory polyarthritis, cachexia, osteoporosis, hyperlipidemia.    Objective:   See tx log  Attempted to incr resistence leg press but UA 2o pain    Imagin2021 LUMBAR MRI  FINDINGS: Patient motion noted.    S-shaped curvature of the thoracolumbar spine.  There is normal lumbar lordosis with trace retrolisthesis of L2 on L3 by 2 mm.  Vertebral body heights are well-maintained.  Mild degenerative disc space loss, endplate spurring, disc desiccation, and   degenerative endplate marrow signal changes throughout the lumbar spine.  A few scattered Schmorl's nodes are noted.  No focal worrisome marrow signal abnormality is seen.   The distal spinal cord and conus medullaris have a normal signal and morphology.  The conus medullaris terminates at the upper L2 level.  The roots of the cauda equina are unremarkable.  Tarlov cysts along the S2 segment of the sacrum.  The paraspinal   soft tissues are unremarkable.   T12-L1:  Mild diffuse disc bulge with a small superimposed right paracentral disc protrusion.  Mild facet arthropathy. There is no significant spinal canal or neural foraminal stenosis.   L1-2:  Diffuse disc bulge with endplate osteophytes.  Mild facet arthropathy with thickening of ligamentum flavum. There is no significant spinal canal or neural foraminal stenosis.   L2-3:  Diffuse disc  bulge with endplate osteophytes and a superimposed left foraminal disc protrusion.  Mild facet arthropathy. There is no significant spinal canal or neural foraminal stenosis.   L3-4:  Diffuse disc bulge with mild facet arthropathy and thickening of ligamentum flavum. There is no significant spinal canal or neural foraminal stenosis.   L4-5:  Diffuse disc bulge with endplate osteophytes.  Mild facet arthropathy with thickening of ligamentum flavum.  No significant spinal canal stenosis.  Mild left neural foraminal stenosis.   L5-S1:  Mild diffuse disc bulge with endplate osteophytes.  Mild facet arthropathy.  No significant spinal canal stenosis.  Mild right neural foraminal stenosis.   CONCLUSION:    1. Multilevel degenerative changes lumbar spine as above without significant spinal canal stenosis at any level.  S-shaped curvature of the thoracolumbar spine noted.   2. Mild left neural foraminal stenosis at L4-5.  Mild right neural foraminal stenosis at L5-S1.   3. Mild facet arthropathy throughout the lumbar spine.     Treatment:  (\"NP\" indicates Not Performed this date)  Manual Therapy:  R patellar mobs med/sup gr 2  Retrograde STM anterior R knee for swelling    Neuromuscular Re-education:    Therapeutic Exercise: 9-12-24 9-17-24 9-19-24 9-24-24 10-2-24   NuStep for CKC strengthening 5min 5min 5min 5min 5min   Supine R HS stretch c strap 20\"x3 20\"x3 20\"x3 20\"x3 20\"x3   R seated PF stretch into MTP extension 15\"x2       R quad set c TR        R SLR 2x10 2x10 3x10 3x10 1#2x10   R SAQ   5\"x10     Hooklying B hip add vs ball   5\"x20 5\"x20 5\"x20   Hooklying B hip ER R5\"x20 R5\"x20 G5\"x20 G5\"x20 G5\"x20   Seated R HS curl vs TB R2x10 Gx20  Gx20 Gx20   R LAQ    1#5\"x10 1#5\"x20   Standing R gastroc stretch 20\"x3  20\"x3 20\"x3 20\"x3    Pt ed soleus stretch and gastroc+plant fascia c towel roll 2min       Standing hip abd R,L  Rx20 Rx20 Rx20 1#x20R   Standing hip ext R,L  Rx20 Rx20 Rx20 1#x20R   sidestepping 2min 2min [id number]   YTB2'   1/2 FR balance  2' 2' 2' 2'    sit to stand  2x10 2x10 2x10 Onto airex 2x10   B HR  x20 x20 x20 R-onlyx20   Standing TKE ball on wall  5\"x15 R,L 5\"x20R 5\"x20R 5\"x20R   SL press on shuttle  3C 2x10 R,L   3C 2x10 R,L   Balboard controlled taps ant-post    2' CGA 2'Sup   Alt 6\" taps    1'             future visits: SLS    HEP: Access Code: [email or web address] ; URL: [email or web address] ; Prepared by: Jena Aguila  Date: 08/27/2024  Exercises  - Supine Hamstring Stretch with Strap  - 2 x daily - 3 reps - 15-20 hold  - Supine Quad Set  - 2 x daily - 2 sets - 10 reps  - Supine Active Straight Leg Raise  - 2 x daily - 2 sets - 10 reps  - Standing Gastroc Stretch at Counter  - 2 x daily - 3 reps - 15-20 hold  - Standing Hip Abduction with Counter Support  - 2 x daily - 2 sets - 20 reps    Assessment/Plan: Attempted incr resistence c leg press today but UA 2o pain. Overall tolerated session well but continued swelling superior to patellar. Progress note next session. Discussed the possibility of ortho consult if pain does not resolve to her satisfaction, given her history of recurrent swelling/pain over past 2 years.     PLAN OF CARE:    Goals: (to be met in 8 visits)  Consistently decr pain R knee < or = 3/10 intermittent for incr QOL and activity tolerance  Overall incr in function as indicated by incr LEFS at least 20 pts  Pt able to stand/walk at least 30 min bouts consistently c little to no pain for incr community mobility  Painfree AROM R knee to indicate decr irritability of pain  Incr RLE MMT at least 1/2 grade painfree for incr mm support for WB activities  Pt able to ascend/descend stairs reciprocally c good form and control, no genu valgus, no pain for incr functional strength  Indep HEP to promote cont progress toward functional goals    Frequency / Duration: Patient will be seen for 2 x/week or a total of 8 visits over a 90 day period.     All Treatments performed at distinct and separate  times during the therapy session.  Treatment Minutes  Units charged   Manual Therapy 12 minutes 1   Therapeutic Activity 0 minutes    Neuromuscular Re-education 0 minutes    Therapeutic Exercise 33 minutes 2   Total Direct Treatment Time 45 minutes

## 2024-10-02 ENCOUNTER — OFFICE VISIT (OUTPATIENT)
Dept: PHYSICAL THERAPY | Age: 69
End: 2024-10-02
Attending: INTERNAL MEDICINE
Payer: MEDICARE

## 2024-10-02 DIAGNOSIS — M17.0 PRIMARY OSTEOARTHRITIS OF BOTH KNEES: ICD-10-CM

## 2024-10-02 DIAGNOSIS — R26.2 DIFFICULTY WALKING: ICD-10-CM

## 2024-10-02 DIAGNOSIS — M25.561 ACUTE PAIN OF RIGHT KNEE: Primary | ICD-10-CM

## 2024-10-02 PROCEDURE — 97110 THERAPEUTIC EXERCISES: CPT

## 2024-10-02 PROCEDURE — 97140 MANUAL THERAPY 1/> REGIONS: CPT

## 2024-10-03 NOTE — PROGRESS NOTES
Progress Summary  Pt has attended 8 visits in Physical Therapy.     Diagnosis:   Acute right knee pain (M25.561),  Localized osteoarthritis of knees, bilateral (M17.0), difficulty walking (R26.2) Referring Provider: Negro Carlson MD Date of Evaluation:    8/27/2024    Precautions:  Cancer, osteoporosis Next MD visit:   10-21-24  Date of Surgery: n/a  Symptom onset; chronic on-off for 1-2 yrs, current exacerbation July 2024     Insurance Primary/Secondary: MEDICARE     Visit 8 of 8   Date POC Expires: 11-25-24       Subjective: Pt states she walked more than she has in a while at the [facility] yesterday, close to 2 miles. R heel/ankle bothered her more thant he knee did.   Pain:      4/10 c walking   Assessment/Plan: Pt's R knee pain is variable ; she reports longest walk in a while yesterday, tolerated nearly 2 miles well. But in clinic this morning her pain was exacerbated c 1 flight of stairs. She does have persistent suprapatellar swelling that contributes to her pain at endrange knee flexion and overall discomfort c WB activities. She has shown some progress toward goals. Pt would continue to benefit from skilled PT to continue to address pain and deficits c goal for consistently incr tolerance for walking/WB activity c minimal to no pain.    @eval: Tatyana Christie is a 68 year old female who presents to therapy today with complaints of R knee pain, generalized, no particular area. She noted a lot of fluid in knee at least 2 years ago, was on steroids and was 'ok for a long time'. Started acting up again this July, incr fluid, difficulty putting weight on RLE, went on steroids again which has helped. She had done some gardening - pushing on shovel c knee, kneeling to weed, things she had not done in a while. R knee has buckled on her 'a few times'. Pt states ice has been helpful in the past to resolve, but has not done recently since she was on steroids No hx of falls. Pt reports N/T B foot N/T after  prolonged sitting which started April 2024. Hasn't had any immunotherapy since 2021 per below. Never had any chemotherapy. \"My back is terrible, I've had a bad back forever.\"  Per review of Epic chart, pt reported normal health until kisha Covid in July 2020. She subsequently had lung abnormalities and dx lung CA.  After discussing treatment options for presumed stage IV lung cancer, she received combined immunotherapy, but development of severe arthritis, chronic fever, and rash, prompted holding the immunotherapy. Symptoms were somewhat relieved with steroids. Also of note, she has had intermittent c/o palpations and dizziness c normal echo and has been referred to cardiology to evaluate.  Pt describes pain level at best 0/10, at worst 8/10.   Current functional limitations include pain/difficulty c stairs, decr walking tolerance.   Tatyana describes prior level of function minimal symptoms c walking, gardening. Pt goals include resolve pain/swelling, incr walking tolerance, stairs, gardening.  Past medical history was reviewed with Tatyana. Significant findings include non-small cell lung CA to mediastinum, inflammatory polyarthritis, cachexia, osteoporosis, hyperlipidemia.    Objective:   See tx log    Observation: visible swelling superior to patella and medial knee -- no change     AROM: (* denotes performed with pain)  @eval 8-27-24 10-4-24   Knee Flexion: R 125*; L 142  Knee Extension: R +4hyperext; L +5hyperext  Hip ER: R 40; L 40  Hip IR: R 30; L 30  R 115A* 4/10 pain      Accessory motion: R patellar mobility WNL (vs eval: decr R patellar mobility)     Strength/MMT: (* denotes performed with pain)  @eval 8-27-24 10-4-24   Hip Flexion: R 4/5; L 4/5  quad: R 4*/5; L 4+/5  hamstring: R 4*/5; L 4+/5  Hip Abd-hooklying: R 4-/5; L 4-/5  Hip ER: R 3*/5; L 3+/5  Hip IR: R 3*/5; L 3+/5  R 4   R 4*   R 4-   R 4-*   R 3+*   R 3+*      Gait: pt ambulates on level ground R genu valgum, good speed, non-antalgic (vs  eval: with  R genu valgus. No assistive device, mild decr stance time RLE)  Stairs: ascends reciprocally c min A 1 railing and R knee pain ; descends reciprocally c CGA 1 railing c present R knee pain but less than on ascent  Balance: pt declines R SLS today 2o pain ; L 5 sec (vs eval SLS: R 1 sec* ; L 3 sec.  Pelvic drop noted B, incr R genu valgum.)     Imagin2021 LUMBAR MRI  FINDINGS: Patient motion noted.    S-shaped curvature of the thoracolumbar spine.  There is normal lumbar lordosis with trace retrolisthesis of L2 on L3 by 2 mm.  Vertebral body heights are well-maintained.  Mild degenerative disc space loss, endplate spurring, disc desiccation, and   degenerative endplate marrow signal changes throughout the lumbar spine.  A few scattered Schmorl's nodes are noted.  No focal worrisome marrow signal abnormality is seen.   The distal spinal cord and conus medullaris have a normal signal and morphology.  The conus medullaris terminates at the upper L2 level.  The roots of the cauda equina are unremarkable.  Tarlov cysts along the S2 segment of the sacrum.  The paraspinal   soft tissues are unremarkable.   T12-L1:  Mild diffuse disc bulge with a small superimposed right paracentral disc protrusion.  Mild facet arthropathy. There is no significant spinal canal or neural foraminal stenosis.   L1-2:  Diffuse disc bulge with endplate osteophytes.  Mild facet arthropathy with thickening of ligamentum flavum. There is no significant spinal canal or neural foraminal stenosis.   L2-3:  Diffuse disc bulge with endplate osteophytes and a superimposed left foraminal disc protrusion.  Mild facet arthropathy. There is no significant spinal canal or neural foraminal stenosis.   L3-4:  Diffuse disc bulge with mild facet arthropathy and thickening of ligamentum flavum. There is no significant spinal canal or neural foraminal stenosis.   L4-5:  Diffuse disc bulge with endplate osteophytes.  Mild facet arthropathy with  thickening of ligamentum flavum.  No significant spinal canal stenosis.  Mild left neural foraminal stenosis.   L5-S1:  Mild diffuse disc bulge with endplate osteophytes.  Mild facet arthropathy.  No significant spinal canal stenosis.  Mild right neural foraminal stenosis.   CONCLUSION:    1. Multilevel degenerative changes lumbar spine as above without significant spinal canal stenosis at any level.  S-shaped curvature of the thoracolumbar spine noted.   2. Mild left neural foraminal stenosis at L4-5.  Mild right neural foraminal stenosis at L5-S1.   3. Mild facet arthropathy throughout the lumbar spine.     Treatment:  (\"NP\" indicates Not Performed this date)  Manual Therapy:  Retrograde STM anterior R knee for swelling    Neuromuscular Re-education:    Therapeutic Exercise: 9-12-24 9-17-24 9-19-24 9-24-24 10-2-24 10-4-24   NuStep for CKC strengthening 5min 5min 5min 5min 5min 5min   Supine R HS stretch c strap 20\"x3 20\"x3 20\"x3 20\"x3 20\"x3 20\"x3   R seated PF stretch into MTP extension 15\"x2        R quad set c TR         R SLR 2x10 2x10 3x10 3x10 1#2x10 1#2x10   R SAQ   5\"x10      Hooklying B hip add vs ball   5\"x20 5\"x20 5\"x20 5\"x20   Hooklying B hip ER R5\"x20 R5\"x20 G5\"x20 G5\"x20 G5\"x20 G5\"x20   Seated R HS curl vs TB R2x10 Gx20  Gx20 Gx20    R LAQ    1#5\"x10 1#5\"x20 1#5\"x20   Standing R gastroc stretch 20\"x3  20\"x3 20\"x3 20\"x3     Pt ed soleus stretch and gastroc+plant fascia c towel roll 2min        Standing hip abd R,L  Rx20 Rx20 Rx20 1#x20R 1#3x10   Standing hip ext R,L  Rx20 Rx20 Rx20 1#x20R 1#3x10   sidestepping 2min 2min [id number]  YTB2' YTB2'   1/2 FR balance  2' 2' 2' 2' 2'    sit to stand  2x10 2x10 2x10 Onto airex 2x10    B HR  x20 x20 x20 R-onlyx20 R-onlyx20   Standing TKE ball on wall  5\"x15 R,L 5\"x20R 5\"x20R 5\"x20R    SL press on shuttle  3C 2x10 R,L   3C 2x10 R,L    Balboard controlled taps ant-post    2' CGA 2'Sup    Alt 6\" taps    1'     reassessment      8'    future visits: SLS    HEP: Access  Code: [email or web address] ; URL: [email or web address] ; Prepared by: Jena Aguila  Date: 08/27/2024  Exercises  - Supine Hamstring Stretch with Strap  - 2 x daily - 3 reps - 15-20 hold  - Supine Quad Set  - 2 x daily - 2 sets - 10 reps  - Supine Active Straight Leg Raise  - 2 x daily - 2 sets - 10 reps  - Standing Gastroc Stretch at Counter  - 2 x daily - 3 reps - 15-20 hold  - Standing Hip Abduction with Counter Support  - 2 x daily - 2 sets - 20 reps    PLAN OF CARE:    Goals: (to be met in 8 visits)  Consistently decr pain R knee < or = 3/10 intermittent for incr QOL and activity tolerance--varies 10-4-24  Overall incr in function as indicated by incr LEFS at least 20 pts--progressing 10-4-24  Pt able to stand/walk at least 30 min bouts consistently c little to no pain for incr community mobility--progressing 10-4-24  Painfree AROM R knee to indicate decr irritability of pain--not met 10-4-24  Incr RLE MMT at least 1/2 grade painfree for incr mm support for WB activities--progressing 10-4-24  Pt able to ascend/descend stairs reciprocally c good form and control, no genu valgus, no pain for incr functional strength--not met 10-4-24  Indep HEP to promote cont progress toward functional goals    All Treatments performed at distinct and separate times during the therapy session.  Treatment Minutes  Units charged   Manual Therapy 8 minutes 1   Therapeutic Activity 0 minutes    Neuromuscular Re-education 0 minutes    Therapeutic Exercise 37 minutes 2   Total Direct Treatment Time 45 minutes      Post LEFS Score  Post LEFS Score: 45 % (10/4/2024  8:45 AM)    7.5 % improvement    Plan: Continue skilled Physical Therapy 1-2 x/week or a total of 12 visits over a 90 day period. Treatment will include: continued progression of RLE functional strength as tolerated.       Patient/Family/Caregiver was advised of these findings, precautions, and treatment options and has agreed to actively participate in planning and  for this course of care.    Thank you for your referral. If you have any questions, please contact me at Dept: [phone].    Sincerely,  Electronically signed by therapist: Jena Aguila PT     Physician's certification required:  Yes  Please co-sign or sign and return this letter via fax as soon as possible to [phone].   I certify the need for these services furnished under this plan of treatment and while under my care.    X___________________________________________________ Date____________________    Certification From: 10/3/2024  To:1/1/2025

## 2024-10-04 ENCOUNTER — OFFICE VISIT (OUTPATIENT)
Dept: PHYSICAL THERAPY | Age: 69
End: 2024-10-04
Attending: INTERNAL MEDICINE
Payer: MEDICARE

## 2024-10-04 DIAGNOSIS — M25.561 ACUTE PAIN OF RIGHT KNEE: Primary | ICD-10-CM

## 2024-10-04 DIAGNOSIS — R26.2 DIFFICULTY WALKING: ICD-10-CM

## 2024-10-04 DIAGNOSIS — M17.0 PRIMARY OSTEOARTHRITIS OF BOTH KNEES: ICD-10-CM

## 2024-10-04 PROCEDURE — 97110 THERAPEUTIC EXERCISES: CPT

## 2024-10-04 PROCEDURE — 97140 MANUAL THERAPY 1/> REGIONS: CPT

## 2024-10-08 ENCOUNTER — OFFICE VISIT (OUTPATIENT)
Dept: PHYSICAL THERAPY | Age: 69
End: 2024-10-08
Attending: INTERNAL MEDICINE
Payer: MEDICARE

## 2024-10-08 DIAGNOSIS — R26.2 DIFFICULTY WALKING: ICD-10-CM

## 2024-10-08 DIAGNOSIS — M25.561 ACUTE PAIN OF RIGHT KNEE: Primary | ICD-10-CM

## 2024-10-08 DIAGNOSIS — M17.0 PRIMARY OSTEOARTHRITIS OF BOTH KNEES: ICD-10-CM

## 2024-10-08 PROCEDURE — 97110 THERAPEUTIC EXERCISES: CPT

## 2024-10-08 PROCEDURE — 97140 MANUAL THERAPY 1/> REGIONS: CPT

## 2024-10-08 NOTE — PROGRESS NOTES
PT DAILY NOTE  Diagnosis:   Acute right knee pain (M25.561),  Localized osteoarthritis of knees, bilateral (M17.0), difficulty walking (R26.2) Referring Provider: Negro Carlson MD Date of Evaluation:    8/27/2024    Precautions:  Cancer, osteoporosis Next MD visit:   10-21-24  Date of Surgery: n/a  Symptom onset; chronic on-off for 1-2 yrs, current exacerbation July 2024     Insurance Primary/Secondary: MEDICARE     Visit 9 of 12   Date POC Expires: 11-25-24       Subjective: Pt states her knee doesn't feel as good as it did last time. She walked every day, but not as much as she would normally.   Pain:      5/10 c walking   Assessment/Plan: Pt required frequent cueing for proper form c minisquats to decrease anterior knee force as her tendency was for excessive anterior translation of tibia. She reports unable to perform ex's requiring FWB on RLE due to her chronic R foot and ankle impairments. Overall her knee swelling was improved today vs prior weeks, but her pain, while variable, is often moderate. Continue to maximize RLE functional strength as tolerated.  @eval: Tatyana Christie is a 68 year old female who presents to therapy today with complaints of R knee pain, generalized, no particular area. She noted a lot of fluid in knee at least 2 years ago, was on steroids and was 'ok for a long time'. Started acting up again this July, incr fluid, difficulty putting weight on RLE, went on steroids again which has helped. She had done some gardening - pushing on shovel c knee, kneeling to weed, things she had not done in a while. R knee has buckled on her 'a few times'. Pt states ice has been helpful in the past to resolve, but has not done recently since she was on steroids No hx of falls. Pt reports N/T B foot N/T after prolonged sitting which started April 2024. Hasn't had any immunotherapy since 2021 per below. Never had any chemotherapy. \"My back is terrible, I've had a bad back forever.\"  Per review of Epic  chart, pt reported normal health until kisha Covid in 2020. She subsequently had lung abnormalities and dx lung CA.  After discussing treatment options for presumed stage IV lung cancer, she received combined immunotherapy, but development of severe arthritis, chronic fever, and rash, prompted holding the immunotherapy. Symptoms were somewhat relieved with steroids. Also of note, she has had intermittent c/o palpations and dizziness c normal echo and has been referred to cardiology to evaluate.  Pt describes pain level at best 0/10, at worst 8/10.   Current functional limitations include pain/difficulty c stairs, decr walking tolerance.   Tatyana describes prior level of function minimal symptoms c walking, gardening. Pt goals include resolve pain/swelling, incr walking tolerance, stairs, gardening.  Past medical history was reviewed with Tatyana. Significant findings include non-small cell lung CA to mediastinum, inflammatory polyarthritis, cachexia, osteoporosis, hyperlipidemia.    Objective:   See tx log    Imagin2021 LUMBAR MRI  FINDINGS: Patient motion noted.    S-shaped curvature of the thoracolumbar spine.  There is normal lumbar lordosis with trace retrolisthesis of L2 on L3 by 2 mm.  Vertebral body heights are well-maintained.  Mild degenerative disc space loss, endplate spurring, disc desiccation, and   degenerative endplate marrow signal changes throughout the lumbar spine.  A few scattered Schmorl's nodes are noted.  No focal worrisome marrow signal abnormality is seen.   The distal spinal cord and conus medullaris have a normal signal and morphology.  The conus medullaris terminates at the upper L2 level.  The roots of the cauda equina are unremarkable.  Tarlov cysts along the S2 segment of the sacrum.  The paraspinal   soft tissues are unremarkable.   T12-L1:  Mild diffuse disc bulge with a small superimposed right paracentral disc protrusion.  Mild facet arthropathy. There is no  significant spinal canal or neural foraminal stenosis.   L1-2:  Diffuse disc bulge with endplate osteophytes.  Mild facet arthropathy with thickening of ligamentum flavum. There is no significant spinal canal or neural foraminal stenosis.   L2-3:  Diffuse disc bulge with endplate osteophytes and a superimposed left foraminal disc protrusion.  Mild facet arthropathy. There is no significant spinal canal or neural foraminal stenosis.   L3-4:  Diffuse disc bulge with mild facet arthropathy and thickening of ligamentum flavum. There is no significant spinal canal or neural foraminal stenosis.   L4-5:  Diffuse disc bulge with endplate osteophytes.  Mild facet arthropathy with thickening of ligamentum flavum.  No significant spinal canal stenosis.  Mild left neural foraminal stenosis.   L5-S1:  Mild diffuse disc bulge with endplate osteophytes.  Mild facet arthropathy.  No significant spinal canal stenosis.  Mild right neural foraminal stenosis.   CONCLUSION:    1. Multilevel degenerative changes lumbar spine as above without significant spinal canal stenosis at any level.  S-shaped curvature of the thoracolumbar spine noted.   2. Mild left neural foraminal stenosis at L4-5.  Mild right neural foraminal stenosis at L5-S1.   3. Mild facet arthropathy throughout the lumbar spine.     Treatment:  (\"NP\" indicates Not Performed this date)  Manual Therapy:  Retrograde STM anterior R knee for swelling    Neuromuscular Re-education:    Therapeutic Exercise: 9-12-24 9-17-24 9-19-24 9-24-24 10-2-24 10-4-24 10-8-24   NuStep for CKC strengthening 5min 5min 5min 5min 5min 5min 5min   Supine R HS stretch c strap 20\"x3 20\"x3 20\"x3 20\"x3 20\"x3 20\"x3 20\"x3   R seated PF stretch into MTP extension 15\"x2         R quad set c TR          R SLR 2x10 2x10 3x10 3x10 1#2x10 1#2x10 1#2x10   R SAQ   5\"x10       Hooklying B hip add vs ball   5\"x20 5\"x20 5\"x20 5\"x20    Hooklying B hip ER R5\"x20 R5\"x20 G5\"x20 G5\"x20 G5\"x20 G5\"x20 B5\"x2min   Seated R  HS curl vs TB R2x10 Gx20  Gx20 Gx20     R LAQ    1#5\"x10 1#5\"x20 1#5\"x20 1#5\"x20   Standing R gastroc stretch 20\"x3  20\"x3 20\"x3 20\"x3      Pt ed soleus stretch and gastroc+plant fascia c towel roll 2min         Standing hip abd R,L  Rx20 Rx20 Rx20 1#x20R 1#3x10 1#3x10   Standing hip ext R,L  Rx20 Rx20 Rx20 1#x20R 1#3x10 1#3x10   sidestepping 2min 2min [id number]  YTB2' YTB2' YTB2'   1/2 FR balance  2' 2' 2' 2' 2' 2'    sit to stand  2x10 2x10 2x10 Onto airex 2x10  Onto airex 2x10   B HR  x20 x20 x20 R-onlyx20 R-onlyx20 R-onlyx20   Standing TKE ball on wall  5\"x15 R,L 5\"x20R 5\"x20R 5\"x20R  5\"x20R   SL press on shuttle  3C 2x10 R,L   3C 2x10 R,L  3C 2x10 R,L   Balboard controlled taps ant-post    2' CGA 2'Sup     Alt 6\" taps    1'      reassessment      8'    Minisquats c cueing       x20                         HEP: Access Code: [email or web address] ; URL: [email or web address] ; Prepared by: Jena Aguila  upDate: 10/08/2024  - Supine Hamstring Stretch with Strap  - 1 x daily - 3 reps - 15-20 hold  - Supine Active Straight Leg Raise  - 1 x daily - 3 sets - 10 reps - 5 hold  - Hooklying Clamshell with Resistance  - 1 x daily - 5 hold - 3 minutes  - Standing Hip Abduction with Counter Support  - 1 x daily - 2 sets - 20 reps  - Side Stepping with Resistance at Feet  - 1 x daily - 2 minutes  - Standing Hip Extension with Counter Support  - 1 x daily - 20 reps  - Standing Terminal Knee Extension at Wall with Ball  - 1 x daily - 20 reps - 5 hold      PLAN OF CARE:    Goals: (to be met in 8 visits)  Consistently decr pain R knee < or = 3/10 intermittent for incr QOL and activity tolerance--varies 10-4-24  Overall incr in function as indicated by incr LEFS at least 20 pts--progressing 10-4-24  Pt able to stand/walk at least 30 min bouts consistently c little to no pain for incr community mobility--progressing 10-4-24  Painfree AROM R knee to indicate decr irritability of pain--not met 10-4-24  Incr RLE MMT at least 1/2  grade painfree for incr mm support for WB activities--progressing 10-4-24  Pt able to ascend/descend stairs reciprocally c good form and control, no genu valgus, no pain for incr functional strength--not met 10-4-24  Indep HEP to promote cont progress toward functional goals    All Treatments performed at distinct and separate times during the therapy session.  Treatment Minutes  Units charged   Manual Therapy 8 minutes 1   Therapeutic Activity 0 minutes    Neuromuscular Re-education 0 minutes    Therapeutic Exercise 37 minutes 2   Total Direct Treatment Time 45 minutes

## 2024-10-11 ENCOUNTER — APPOINTMENT (OUTPATIENT)
Dept: PHYSICAL THERAPY | Age: 69
End: 2024-10-11
Attending: INTERNAL MEDICINE
Payer: MEDICARE

## 2024-10-17 NOTE — PROGRESS NOTES
Discharge Summary  Pt has attended 10 visits in Physical Therapy.     Diagnosis:   Acute right knee pain (M25.561),  Localized osteoarthritis of knees, bilateral (M17.0), difficulty walking (R26.2) Referring Provider: Negro Carlson MD Date of Evaluation:    8/27/2024    Precautions:  Cancer, osteoporosis Next MD visit:   10-21-24  Date of Surgery: n/a  Symptom onset; chronic on-off for 1-2 yrs, current exacerbation July 2024     Insurance Primary/Secondary: MEDICARE     Visit 10 of 12   Date POC Expires: 11-25-24       Subjective: Pt states overall her knee is feeling better and the fluid has decreased since starting PT, but not resolved. Mornings are still bad for her whole body.   Pain:      3-4/10 c walking   Assessment/Plan: Pt has had some improvements since starting PT, but R knee swelling and pain persist. Pt c/o chronic R ankle pain and previous surgery; she demonstrates overpronation R foot which likely contributes to her significant R knee valgus. This knee positioning/strain may be driving some of her knee swelling/discomfort. We have discussed foot/ankle follow-up, footwear and/or inserts to provide increased arch support, which may in turn positively affect her knee pain by way of decreased genu valgum. Pt may also benefit from knee imaging and/or ortho consult given this a recurring issue over past 1-2 yrs. Given slow progress, pt is appropriate to transition to independent management at this time. HEP reviewed c pt in detail today, advanced, and new handout given. Pt in agreement c this plan and all questions answered.     @eval: Tatyana Christie is a 68 year old female who presents to therapy today with complaints of R knee pain, generalized, no particular area. She noted a lot of fluid in knee at least 2 years ago, was on steroids and was 'ok for a long time'. Started acting up again this July, incr fluid, difficulty putting weight on RLE, went on steroids again which has helped. She had done some  gardening - pushing on shovel c knee, kneeling to weed, things she had not done in a while. R knee has buckled on her 'a few times'. Pt states ice has been helpful in the past to resolve, but has not done recently since she was on steroids No hx of falls. Pt reports N/T B foot N/T after prolonged sitting which started April 2024. Hasn't had any immunotherapy since 2021 per below. Never had any chemotherapy. \"My back is terrible, I've had a bad back forever.\"  Per review of Epic chart, pt reported normal health until kisha Covid in July 2020. She subsequently had lung abnormalities and dx lung CA.  After discussing treatment options for presumed stage IV lung cancer, she received combined immunotherapy, but development of severe arthritis, chronic fever, and rash, prompted holding the immunotherapy. Symptoms were somewhat relieved with steroids. Also of note, she has had intermittent c/o palpations and dizziness c normal echo and has been referred to cardiology to evaluate.  Pt describes pain level at best 0/10, at worst 8/10.   Current functional limitations include pain/difficulty c stairs, decr walking tolerance.   Tatyana describes prior level of function minimal symptoms c walking, gardening. Pt goals include resolve pain/swelling, incr walking tolerance, stairs, gardening.  Past medical history was reviewed with Tatyana. Significant findings include non-small cell lung CA to mediastinum, inflammatory polyarthritis, cachexia, osteoporosis, hyperlipidemia.    Objective:   See tx log    Observation: visible swelling superior to patella and medial knee -- improved but it persists     AROM: (* denotes performed with pain)  @eval 8-27-24 10-4-24 10-18-24   Knee Flexion: R 125*; L 142  Knee Extension: R +4hyperext; L +5hyperext  Hip ER: R 40; L 40  Hip IR: R 30; L 30  R 115A* 4/10 pain  R 123A*      Accessory motion: R patellar mobility WNL (vs eval: decr R patellar mobility)     Strength/MMT: (* denotes performed  with pain)  @eval 8-27-24 10-4-24 10-18-24   Hip Flexion: R 4/5; L 4/5  quad: R 4*/5; L 4+/5  hamstring: R 4*/5; L 4+/5  Hip Abd-hooklying: R 4-/5; L 4-/5  Hip ER: R 3*/5; L 3+/5  Hip IR: R 3*/5; L 3+/5  R 4   R 4*   R 4-   R 4-*   R 3+*   R 3+*  R 4   R 4*   R 4-*   R 4-*   R 4-*   R 3+*      Gait: pt ambulates on level ground R genu valgum, R foot overpronation, good speed, non-antalgic (vs eval: with  R genu valgus. No assistive device, mild decr stance time RLE)  Stairs: ascends reciprocally c CGA/Essie 1 railing, R genu valgum, no pain ; descends reciprocally c more pronounced R genu valgum, CGA/Essie 1 railing no knee pain  (Vs eval: ascends reciprocally c min A 1 railing and R knee pain ; descends reciprocally c CGA 1 railing c present R knee pain but less than on ascent)  Balance: pt declines R SLS today 2o foot/ankle pain ; L 5 sec (vs eval SLS: R 1 sec* ; L 3 sec.  Pelvic drop noted B, incr R genu valgum.)    Imagin2021 LUMBAR MRI  FINDINGS: Patient motion noted.    S-shaped curvature of the thoracolumbar spine.  There is normal lumbar lordosis with trace retrolisthesis of L2 on L3 by 2 mm.  Vertebral body heights are well-maintained.  Mild degenerative disc space loss, endplate spurring, disc desiccation, and   degenerative endplate marrow signal changes throughout the lumbar spine.  A few scattered Schmorl's nodes are noted.  No focal worrisome marrow signal abnormality is seen.   The distal spinal cord and conus medullaris have a normal signal and morphology.  The conus medullaris terminates at the upper L2 level.  The roots of the cauda equina are unremarkable.  Tarlov cysts along the S2 segment of the sacrum.  The paraspinal   soft tissues are unremarkable.   T12-L1:  Mild diffuse disc bulge with a small superimposed right paracentral disc protrusion.  Mild facet arthropathy. There is no significant spinal canal or neural foraminal stenosis.   L1-2:  Diffuse disc bulge with endplate  osteophytes.  Mild facet arthropathy with thickening of ligamentum flavum. There is no significant spinal canal or neural foraminal stenosis.   L2-3:  Diffuse disc bulge with endplate osteophytes and a superimposed left foraminal disc protrusion.  Mild facet arthropathy. There is no significant spinal canal or neural foraminal stenosis.   L3-4:  Diffuse disc bulge with mild facet arthropathy and thickening of ligamentum flavum. There is no significant spinal canal or neural foraminal stenosis.   L4-5:  Diffuse disc bulge with endplate osteophytes.  Mild facet arthropathy with thickening of ligamentum flavum.  No significant spinal canal stenosis.  Mild left neural foraminal stenosis.   L5-S1:  Mild diffuse disc bulge with endplate osteophytes.  Mild facet arthropathy.  No significant spinal canal stenosis.  Mild right neural foraminal stenosis.   CONCLUSION:    1. Multilevel degenerative changes lumbar spine as above without significant spinal canal stenosis at any level.  S-shaped curvature of the thoracolumbar spine noted.   2. Mild left neural foraminal stenosis at L4-5.  Mild right neural foraminal stenosis at L5-S1.   3. Mild facet arthropathy throughout the lumbar spine.     Treatment:  (\"NP\" indicates Not Performed this date)  Manual Therapy:    Neuromuscular Re-education:    Therapeutic Exercise: 9-24-24 10-2-24 10-4-24 10-8-24 10-18-24   NuStep for CKC strengthening 5min 5min 5min 5min 5min   Supine R HS stretch c strap 20\"x3 20\"x3 20\"x3 20\"x3 20\"x3   R seated PF stretch into MTP extension        R quad set c TR        R SLR 3x10 1#2x10 1#2x10 1#2x10 2#2x10   R SAQ        Hooklying B hip add vs ball 5\"x20 5\"x20 5\"x20     Hooklying B hip ER G5\"x20 G5\"x20 G5\"x20 B5\"x2min    Seated R HS curl vs TB Gx20 Gx20      R LAQ 1#5\"x10 1#5\"x20 1#5\"x20 1#5\"x20 2#5\"x20   Standing R gastroc stretch 20\"x3       Pt ed soleus stretch and gastroc+plant fascia c towel roll        Standing hip abd R,L Rx20 1#x20R 1#3x10 1#3x10     Standing hip ext R,L Rx20 1#x20R 1#3x10 1#3x10    sidestepping  YTB2' YTB2' YTB2' YTB2'   1/2 FR balance 2' 2' 2' 2'     sit to stand 2x10 Onto airex 2x10  Onto airex 2x10 [email or web address]   B HR x20 R-onlyx20 R-onlyx20 R-onlyx20    Standing TKE ball on wall 5\"x20R 5\"x20R  5\"x20R    SL press on shuttle  3C 2x10 R,L  3C 2x10 R,L 3C 2x10 R,L   Balboard controlled taps ant-post 2' CGA 2'Sup      Alt 6\" taps 1'       reassessment   8'  8'   Minisquats c cueing    x20    HEP review/revise/reissue     10'             HEP: Access Code: [email or web address] ; URL: [email or web address] ; Prepared by: Jena Aguila  UPDate: 10/18/2024  - Supine Hamstring Stretch with Strap  - 1 x daily - 3 reps - 15-20 hold  - Supine Active Straight Leg Raise  - 3 x weekly - 3 sets - 10 reps - 5 hold  - Hooklying Clamshell with Resistance  - 1 x daily - 5 hold - 3 minutes  - Standing Hip Abduction with Counter Support  - 3 x weekly - 2 sets - 20 reps  - Side Stepping with Resistance at Feet  - 1 x daily - 2 minutes  - Standing Hip Extension with Counter Support  - 3 x weekly - 2 sets - 20 reps  - Standing Terminal Knee Extension at Wall with Ball  - 1 x daily - 20 reps - 5 hold  - Sit to Stand Without Arm Support  - 3 x weekly - 3 sets - 10 reps  - Sitting Knee Extension with Resistance  - 3 x weekly - 20 reps - 5 hold      PLAN OF CARE:    Goals: (to be met in 8 visits)  Consistently decr pain R knee < or = 3/10 intermittent for incr QOL and activity tolerance--varies 10-18-24  Overall incr in function as indicated by incr LEFS at least 20 pts--progressing 10-18-24  Pt able to stand/walk at least 30 min bouts consistently c little to no pain for incr community mobility--progressing 10-18-24  Painfree AROM R knee to indicate decr irritability of pain--not met 10-18-24  Incr RLE MMT at least 1/2 grade painfree for incr mm support for WB activities--progressing 10-18-24  Pt able to ascend/descend stairs reciprocally c good form and  control, no genu valgus, no pain for incr functional strength--not met 10-18-24  Indep HEP to promote cont progress toward functional goals    All Treatments performed at distinct and separate times during the therapy session.  Treatment Minutes  Units charged   Manual Therapy 0 minutes    Therapeutic Activity 0 minutes    Neuromuscular Re-education 0 minutes    Therapeutic Exercise 45 minutes 3   Total Direct Treatment Time 45 minutes      Post LEFS Score  Post LEFS Score: (Patient-Rptd) 45 % (10/18/2024  8:22 AM)    7.5 % improvement    Plan: d /c PT to indep HEP    Patient/Family/Caregiver was advised of these findings, precautions, and treatment options and has agreed to actively participate in planning and for this course of care.    Thank you for your referral. If you have any questions, please contact me at Dept: [phone].    Sincerely,  Electronically signed by therapist: Jena Aguila PT     Physician's certification required:  Yes  Please co-sign or sign and return this letter via fax as soon as possible to [phone].   I certify the need for these services furnished under this plan of treatment and while under my care.    X___________________________________________________ Date____________________    Certification From: 10/18/2024  To:1/16/2025

## 2024-10-18 ENCOUNTER — OFFICE VISIT (OUTPATIENT)
Dept: PHYSICAL THERAPY | Age: 69
End: 2024-10-18
Attending: INTERNAL MEDICINE
Payer: MEDICARE

## 2024-10-18 DIAGNOSIS — M17.0 PRIMARY OSTEOARTHRITIS OF BOTH KNEES: ICD-10-CM

## 2024-10-18 DIAGNOSIS — M25.561 ACUTE PAIN OF RIGHT KNEE: Primary | ICD-10-CM

## 2024-10-18 DIAGNOSIS — R26.2 DIFFICULTY WALKING: ICD-10-CM

## 2024-10-18 PROCEDURE — 97110 THERAPEUTIC EXERCISES: CPT

## 2024-10-21 ENCOUNTER — OFFICE VISIT (OUTPATIENT)
Dept: RHEUMATOLOGY | Facility: CLINIC | Age: 69
End: 2024-10-21
Payer: MEDICARE

## 2024-10-21 VITALS
HEART RATE: 72 BPM | OXYGEN SATURATION: 96 % | DIASTOLIC BLOOD PRESSURE: 62 MMHG | WEIGHT: 104.81 LBS | RESPIRATION RATE: 16 BRPM | SYSTOLIC BLOOD PRESSURE: 110 MMHG | BODY MASS INDEX: 17.89 KG/M2 | TEMPERATURE: 97 F | HEIGHT: 64 IN

## 2024-10-21 DIAGNOSIS — C34.90 NON-SMALL CELL LUNG CANCER METASTATIC TO MEDIASTINUM (HCC): ICD-10-CM

## 2024-10-21 DIAGNOSIS — Z79.899 IMMUNODEFICIENCY DUE TO DRUG THERAPY (HCC): ICD-10-CM

## 2024-10-21 DIAGNOSIS — D84.821 IMMUNODEFICIENCY DUE TO DRUG THERAPY (HCC): ICD-10-CM

## 2024-10-21 DIAGNOSIS — Z51.81 THERAPEUTIC DRUG MONITORING: ICD-10-CM

## 2024-10-21 DIAGNOSIS — M81.0 AGE RELATED OSTEOPOROSIS, UNSPECIFIED PATHOLOGICAL FRACTURE PRESENCE: ICD-10-CM

## 2024-10-21 DIAGNOSIS — M06.00 SERONEGATIVE RHEUMATOID ARTHRITIS (HCC): Primary | ICD-10-CM

## 2024-10-21 DIAGNOSIS — M19.012 ARTHRITIS OF LEFT GLENOHUMERAL JOINT: ICD-10-CM

## 2024-10-21 DIAGNOSIS — Z23 NEED FOR INFLUENZA VACCINATION: ICD-10-CM

## 2024-10-21 DIAGNOSIS — C78.1 NON-SMALL CELL LUNG CANCER METASTATIC TO MEDIASTINUM (HCC): ICD-10-CM
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
## 2024-11-01 DIAGNOSIS — M06.4 INFLAMMATORY POLYARTHRITIS (HCC): ICD-10-CM

## 2024-11-01 DIAGNOSIS — M06.00 SERONEGATIVE RHEUMATOID ARTHRITIS (HCC): Primary | ICD-10-CM

## 2024-11-01 RX ORDER — LEFLUNOMIDE 20 MG/1
20 TABLET ORAL DAILY
Qty: 90 TABLET | Refills: 0 | Status: SHIPPED | OUTPATIENT
Start: 2024-11-01

## 2024-11-01 NOTE — TELEPHONE ENCOUNTER
Last office visit: 10/21/2024    Next Rheum Apt:4/21/2025 Negro Carlson MD    Last fill: 8/5/2024  90 tabs, 0 refills     Labs:   Lab Results   Component Value Date    CREATSERUM 0.51 (L) 08/12/2024    ALKPHO 92 08/12/2024    AST 15 08/12/2024    ALT 17 08/12/2024    BILT 0.4 08/12/2024    TP 7.0 08/12/2024    ALB 4.4 08/12/2024       Lab Results   Component Value Date    WBC 8.4 08/12/2024    HGB 13.0 08/12/2024    .0 08/12/2024    NEPRELIM 5.94 08/12/2024    NEPERCENT 71.0 08/12/2024    LYPERCENT 17.9 08/12/2024    NE 5.94 08/12/2024    LYMABS 1.50 08/12/2024

## 2024-11-02 ENCOUNTER — LAB ENCOUNTER (OUTPATIENT)
Dept: LAB | Age: 69
End: 2024-11-02
Attending: INTERNAL MEDICINE
Payer: MEDICARE

## 2024-11-02 ENCOUNTER — PATIENT MESSAGE (OUTPATIENT)
Dept: RHEUMATOLOGY | Facility: CLINIC | Age: 69
End: 2024-11-02

## 2024-11-02 DIAGNOSIS — M25.461 EFFUSION OF RIGHT KNEE: ICD-10-CM

## 2024-11-02 DIAGNOSIS — Z79.899 IMMUNODEFICIENCY DUE TO DRUG THERAPY (HCC): ICD-10-CM

## 2024-11-02 DIAGNOSIS — M05.79 RHEUMATOID ARTHRITIS INVOLVING MULTIPLE SITES WITH POSITIVE RHEUMATOID FACTOR (HCC): Primary | ICD-10-CM

## 2024-11-02 DIAGNOSIS — D84.821 IMMUNODEFICIENCY DUE TO DRUG THERAPY (HCC): ICD-10-CM

## 2024-11-02 DIAGNOSIS — M06.4 INFLAMMATORY POLYARTHRITIS (HCC): ICD-10-CM

## 2024-11-02 DIAGNOSIS — M06.00 SERONEGATIVE RHEUMATOID ARTHRITIS (HCC): ICD-10-CM

## 2024-11-02 LAB
ALBUMIN SERPL-MCNC: 4 G/DL (ref 3.2–4.8)
ALBUMIN/GLOB SERPL: 1.4 {RATIO} (ref 1–2)
ALP LIVER SERPL-CCNC: 95 U/L
ALT SERPL-CCNC: 14 U/L
ANION GAP SERPL CALC-SCNC: <0 MMOL/L (ref 0–18)
AST SERPL-CCNC: 20 U/L (ref ?–34)
BASOPHILS # BLD AUTO: 0.07 X10(3) UL (ref 0–0.2)
BASOPHILS NFR BLD AUTO: 1.1 %
BILIRUB SERPL-MCNC: 0.4 MG/DL (ref 0.2–1.1)
BUN BLD-MCNC: 12 MG/DL (ref 9–23)
CALCIUM BLD-MCNC: 10 MG/DL (ref 8.7–10.4)
CHLORIDE SERPL-SCNC: 106 MMOL/L (ref 98–112)
CO2 SERPL-SCNC: 32 MMOL/L (ref 21–32)
CREAT BLD-MCNC: 0.49 MG/DL
CRP SERPL-MCNC: 1.2 MG/DL (ref ?–0.5)
EGFRCR SERPLBLD CKD-EPI 2021: 104 ML/MIN/1.73M2 (ref 60–?)
EOSINOPHIL # BLD AUTO: 0.11 X10(3) UL (ref 0–0.7)
EOSINOPHIL NFR BLD AUTO: 1.7 %
ERYTHROCYTE [DISTWIDTH] IN BLOOD BY AUTOMATED COUNT: 13 %
ERYTHROCYTE [SEDIMENTATION RATE] IN BLOOD: 20 MM/HR
FASTING STATUS PATIENT QL REPORTED: NO
GLOBULIN PLAS-MCNC: 2.8 G/DL (ref 2–3.5)
GLUCOSE BLD-MCNC: 84 MG/DL (ref 70–99)
HCT VFR BLD AUTO: 36.4 %
HGB BLD-MCNC: 11.4 G/DL
IMM GRANULOCYTES # BLD AUTO: 0.02 X10(3) UL (ref 0–1)
IMM GRANULOCYTES NFR BLD: 0.3 %
LYMPHOCYTES # BLD AUTO: 1.37 X10(3) UL (ref 1–4)
LYMPHOCYTES NFR BLD AUTO: 20.6 %
MCH RBC QN AUTO: 30.3 PG (ref 26–34)
MCHC RBC AUTO-ENTMCNC: 31.3 G/DL (ref 31–37)
MCV RBC AUTO: 96.8 FL
MONOCYTES # BLD AUTO: 0.73 X10(3) UL (ref 0.1–1)
MONOCYTES NFR BLD AUTO: 11 %
NEUTROPHILS # BLD AUTO: 4.34 X10 (3) UL (ref 1.5–7.7)
NEUTROPHILS # BLD AUTO: 4.34 X10(3) UL (ref 1.5–7.7)
NEUTROPHILS NFR BLD AUTO: 65.3 %
OSMOLALITY SERPL CALC.SUM OF ELEC: 283 MOSM/KG (ref 275–295)
PLATELET # BLD AUTO: 268 10(3)UL (ref 150–450)
POTASSIUM SERPL-SCNC: 5 MMOL/L (ref 3.5–5.1)
PROT SERPL-MCNC: 6.8 G/DL (ref 5.7–8.2)
RBC # BLD AUTO: 3.76 X10(6)UL
SODIUM SERPL-SCNC: 137 MMOL/L (ref 136–145)
WBC # BLD AUTO: 6.6 X10(3) UL (ref 4–11)

## 2024-11-02 PROCEDURE — 85652 RBC SED RATE AUTOMATED: CPT

## 2024-11-02 PROCEDURE — 36415 COLL VENOUS BLD VENIPUNCTURE: CPT

## 2024-11-02 PROCEDURE — 86140 C-REACTIVE PROTEIN: CPT

## 2024-11-02 PROCEDURE — 85025 COMPLETE CBC W/AUTO DIFF WBC: CPT

## 2024-11-02 PROCEDURE — 80053 COMPREHEN METABOLIC PANEL: CPT

## 2024-11-05 ENCOUNTER — HOSPITAL ENCOUNTER (OUTPATIENT)
Dept: GENERAL RADIOLOGY | Facility: HOSPITAL | Age: 69
Discharge: HOME | End: 2024-11-05
Attending: INTERNAL MEDICINE
Payer: MEDICARE

## 2024-11-05 ENCOUNTER — LAB ENCOUNTER (OUTPATIENT)
Dept: LAB | Facility: HOSPITAL | Age: 69
End: 2024-11-05
Attending: INTERNAL MEDICINE
Payer: MEDICARE

## 2024-11-05 DIAGNOSIS — D84.821 IMMUNODEFICIENCY DUE TO DRUG THERAPY (HCC): ICD-10-CM

## 2024-11-05 DIAGNOSIS — M25.461 EFFUSION OF RIGHT KNEE: ICD-10-CM

## 2024-11-05 DIAGNOSIS — Z79.899 IMMUNODEFICIENCY DUE TO DRUG THERAPY (HCC): ICD-10-CM

## 2024-11-05 DIAGNOSIS — M06.00 SERONEGATIVE RHEUMATOID ARTHRITIS (HCC): ICD-10-CM

## 2024-11-05 DIAGNOSIS — Z23 NEED FOR INFLUENZA VACCINATION: ICD-10-CM

## 2024-11-05 LAB — URATE SERPL-MCNC: 2.7 MG/DL

## 2024-11-05 PROCEDURE — 36415 COLL VENOUS BLD VENIPUNCTURE: CPT

## 2024-11-05 PROCEDURE — 73562 X-RAY EXAM OF KNEE 3: CPT | Performed by: INTERNAL MEDICINE

## 2024-11-05 PROCEDURE — 84550 ASSAY OF BLOOD/URIC ACID: CPT

## 2024-12-10 ENCOUNTER — TELEPHONE (OUTPATIENT)
Dept: INTERNAL MEDICINE CLINIC | Facility: CLINIC | Age: 69
End: 2024-12-10

## 2024-12-11 ENCOUNTER — TELEPHONE (OUTPATIENT)
Dept: ORTHOPEDICS CLINIC | Facility: CLINIC | Age: 69
End: 2024-12-11

## 2024-12-11 NOTE — TELEPHONE ENCOUNTER
NP, referred by physical therapist for R knee pain.    Advised patient to arrive 20 min early for possible imaging.    Please advise, thanks.

## 2024-12-12 NOTE — TELEPHONE ENCOUNTER
No x rays needed, < 3 mos with OA.      Future Appointments   Date Time Provider Department Center   12/16/2024  9:20 AM Josep Palacios PA-C [facility] [address]

## 2024-12-16 ENCOUNTER — OFFICE VISIT (OUTPATIENT)
Facility: CLINIC | Age: 69
End: 2024-12-16
Payer: MEDICARE

## 2024-12-16 ENCOUNTER — TELEPHONE (OUTPATIENT)
Dept: ORTHOPEDICS CLINIC | Facility: CLINIC | Age: 69
End: 2024-12-16

## 2024-12-16 VITALS — HEIGHT: 64 IN | WEIGHT: 104.81 LBS | BODY MASS INDEX: 17.89 KG/M2

## 2024-12-16 DIAGNOSIS — M06.4 INFLAMMATORY POLYARTHRITIS (HCC): Primary | ICD-10-CM

## 2024-12-16 DIAGNOSIS — M17.11 PRIMARY OSTEOARTHRITIS OF RIGHT KNEE: ICD-10-CM

## 2024-12-16 PROCEDURE — 99203 OFFICE O/P NEW LOW 30 MIN: CPT | Performed by: PHYSICIAN ASSISTANT

## 2024-12-16 NOTE — H&P
[facility] New Patient Note    CC:   Chief Complaint   Patient presents with    Knee Pain     Right knee pain and swelling;   ONSET: 2023  Pain Score: 6-7       HPI: This 68 year old female presents today with complaints of right knee pain.  Patient reports a history of inflammatory arthritis, seronegative rheumatoid arthritis and has been working with Dr. Negro Carlson over the last couple of years.  She reports knee pain that has been ongoing since 2023.  The pain is diffusely around the knee and is associated with swelling.  The knee has been aspirated in the past by Dr. Carlson  demonstrating results consistent with inflammatory arthritis.  The patient feels that the pain is progressively been worsening.  She uses turmeric and an anti-inflammatory diet to control her symptoms.  She previously had been using NSAIDs on a daily basis but due to stomach issues discontinue this.  No previous steroid injections into the knees, however the patient expresses desire to avoid steroid injections given history of steroid flare, concerns with cartilage wear with steroid injections.    Past Medical History:    Anesthesia complication    stated they had a hard time waking pt up after ankle surgery     Arrhythmia    Arthritis    Back problem    Cancer (HCC)    Cataract    Esophageal reflux    Hyperlipidemia    Lung cancer (HCC)    Adenocarcinoma    Osteoarthritis    Pneumothorax, right    Shortness of breath    Visual impairment    contacts and glasses     Past Surgical History:   Procedure Laterality Date    Cataract Right 05/2023    Cataract Left 06/2023    Fracture surgery Right     right ankle fracture surgery with pins and plates     Other surgical history      laser surgery on retina     Current Outpatient Medications   Medication Sig Dispense Refill    NON FORMULARY Green tea supplement      LEFLUNOMIDE 20 MG Oral Tab Take 1 tablet (20 mg total) by mouth daily. 90 tablet 0    Acetaminophen (TYLENOL ARTHRITIS PAIN OR)  Take by mouth.      Nettle, Urtica Dioica, (NETTLE LEAF OR) Take by mouth.      Brit, Zingiber officinalis, (BRIT OR) Take by mouth. Oral brit supplement 1x daily      Turmeric (QC TUMERIC COMPLEX OR) Take by mouth.      Multiple Vitamins-Minerals (MULTI-VITAMIN/MINERALS) Oral Tab Take 1 tablet by mouth daily.       Allergies[1]  Family History   Problem Relation Age of Onset    Heart Disorder Mother     Dementia Mother     Stroke Mother     Diabetes Father      Social History     Occupational History    Not on file   Tobacco Use    Smoking status: Former     Current packs/day: 0.00     Average packs/day: 1.6 packs/day for 50.7 years (80.0 ttl pk-yrs)     Types: Cigarettes     Start date: 1968     Quit date: 2010     Years since quittin.2    Smokeless tobacco: Never   Vaping Use    Vaping status: Never Used   Substance and Sexual Activity    Alcohol use: Not Currently    Drug use: Never    Sexual activity: Not on file        ROS:  Comprehensive system review obtained and negative except as mentioned above    Physical Exam:    Ht 5' 4\" (1.626 m)   Wt 104 lb 12.8 oz (47.5 kg)   BMI 17.99 kg/m²   Constitutional: Awake, alert, no distress.  Present with spouse.  Psychological: Appropriate affect.  Respiratory: Unlabored breathing.  Right lower extremity:  Inspection: skin is intact without any redness or deformity.  Moderate suprapatellar effusion.   Palpation: Tender to palpation along the medial and lateral joint lines of the knee.  Range of motion: Knee can extend to 5 degrees short of full and flex to approximately 100 degrees.  Knee is stable to valgus and varus stress at 0 and 30 degrees. No laxity with anterior or posterior drawer.   Neuromuscular: Strength is normal and sensation is intact.  Vascular: Extremities are warm and well-perfused.  Lymph: Unremarkable.    Imaging: Imaging was personally viewed, independently interpreted and radiology report read.  Nonweightbearing radiographs of  the right knee obtained on 11/5/2024 demonstrates osteophyte formation along the medial femoral condyle, slight osteophyte formation along the lateral femoral condyle and moderate suprapatellar effusion.    Assessment/Plan:  Diagnoses and all orders for this visit:    Inflammatory polyarthritis (HCC)    Primary osteoarthritis of right knee  -     [facility] FOR [facility]ARE [facility]      Assessment: 68-year-old female with symptomatic radiographically moderate right knee osteoarthritis as well as components of inflammatory arthritis    Plan: We discussed the implications of both osteoarthritis and inflammatory arthritis.  I discussed that the persistent effusions is likely related to the inflammatory component of the arthritis especially given the global nature of her pain around the knee.  I discussed inflammation of the joint capsule as opposed to the cartilage and the difference in symptoms felt because of these 2 areas of inflammation.  We did discuss knee replacement surgery but I did advise the patient that she may continue to experience effusions of the knee even after replacement given the inflammatory polyarthritis.  We discussed nonsurgical approaches to help manage her pain.  She is interested in avoiding steroid injections but I did discuss viscosupplementation injections for the knee.  I did discuss the limited efficacy of Visco injections compared to steroid injections but this would be appropriate for the patient given her adverse reaction to steroid in the past.  Prior authorization has been written for Synvisc 1 injection for the right knee.  Patient also wishes to have her knee aspirated at the time of Visco injection.  Her questions were sought and answered satisfactorily.  She is happy with the plan and will follow as advised.      Josep Palacios PA-C  [facility]    This note was dictated using Dragon software.  While it was briefly proofread prior to completion, some  grammatical, spelling, and word choice errors due to dictation may still occur.       [1] No Known Allergies

## 2024-12-23 ENCOUNTER — TELEPHONE (OUTPATIENT)
Facility: CLINIC | Age: 69
End: 2024-12-23

## 2024-12-23 NOTE — TELEPHONE ENCOUNTER
Pt called to verfiy if gel shot is approve and pt wants to know if  is referring her to a specialist for rt knees. According to pt she saw this information in appt review notes.  Future Appointments  2/8/2025   9:15 AM    [facility] CT RM1                 [facility]              [place]  2/10/2025  10:00 AM   Vishnu Cuenca MD   NP [facility]  4/21/2025  10:15 AM   Negro Carlson MD           [facility]        [facility]

## 2024-12-23 NOTE — TELEPHONE ENCOUNTER
Future Appointments   Date Time Provider Department Center   1/15/2025 10:20 AM Josep Palacios PA-C [facility]   2/8/2025  9:15 AM [facility] CT RM1 [facility] [place]   2/10/2025 10:00 AM Vishnu Cuenca MD NP [facility]   4/21/2025 10:15 AM Negro Carlson MD [facility] [place]

## 2024-12-24 NOTE — TELEPHONE ENCOUNTER
Patient authorized visco to be scheduled:    DOS:1/17/25  PROVIDER:lucinda   MEDICATION:synvisc-one  OFFICE LOCATION:[place]   PULLED:needs to be pulled   LABELED:needs to be labeled   PLACED: needs to be placed

## 2025-01-17 ENCOUNTER — OFFICE VISIT (OUTPATIENT)
Dept: ORTHOPEDICS CLINIC | Facility: CLINIC | Age: 70
End: 2025-01-17
Payer: MEDICARE

## 2025-01-17 DIAGNOSIS — M17.11 PRIMARY OSTEOARTHRITIS OF RIGHT KNEE: Primary | ICD-10-CM

## 2025-01-17 PROCEDURE — 20610 DRAIN/INJ JOINT/BURSA W/O US: CPT | Performed by: PHYSICIAN ASSISTANT

## 2025-01-17 NOTE — PROCEDURES
After informed consent, the patient's right knee was marked and prepped with antiseptic solution.  Local anesthetic was used to create a skin wheal near the superolateral aspiration site.  It was reprepped with antiseptic solution, and an 18-gauge needle was inserted through the superolateral portal site, into the knee joint deep to the patella.  Aspiration was performed and returned 78cc of cloudy, yellow synovial fluid.  Fluid was discarded. Following this, the patient's right knee was was injected with 6mL of 48mg/6mL Synvisc One through the superolateral portal.  A band-aid was applied.  The patient tolerated the procedure well.   A band-aid was applied.  The patient tolerated the procedure well.    Josep Palacios PA-C  [facility]

## 2025-01-17 NOTE — PROCEDURES
Risks and benefits of knee injection discussed with the patient, with risks including but not limited to pain and swelling at the injection site and/or within the knee joint, infection, elevation in blood pressure and/or glucose levels, facial flushing. After informed consent, the patient's right knee was marked, locally anesthetized with skin refrigerant, prepped with topical antiseptic, and injected with 6mL of 48mg/6mL Synvisc One and 3cc 1% lidocaine through the inferolateral portal.  A band-aid was applied.  The patient tolerated the procedure well.    Josep Palacios PA-C  [facility]

## 2025-02-01 DIAGNOSIS — M06.4 INFLAMMATORY POLYARTHRITIS (HCC): ICD-10-CM

## 2025-02-01 DIAGNOSIS — M06.00 SERONEGATIVE RHEUMATOID ARTHRITIS (HCC): Primary | ICD-10-CM

## 2025-02-03 RX ORDER — LEFLUNOMIDE 20 MG/1
20 TABLET ORAL DAILY
Qty: 90 TABLET | Refills: 0 | Status: SHIPPED | OUTPATIENT
Start: 2025-02-03 | End: 2025-05-05

## 2025-02-03 NOTE — TELEPHONE ENCOUNTER
Last office visit: 10/21/24    Next Rheum Apt:4/21/2025 Negro Carlson MD    Last fill: 11/1/24    Labs:   Lab Results   Component Value Date    CREATSERUM 0.49 (L) 11/02/2024    ALKPHO 95 11/02/2024    AST 20 11/02/2024    ALT 14 11/02/2024    BILT 0.4 11/02/2024    TP 6.8 11/02/2024    ALB 4.0 11/02/2024       Lab Results   Component Value Date    WBC 6.6 11/02/2024    HGB 11.4 (L) 11/02/2024    .0 11/02/2024    NEPRELIM 4.34 11/02/2024    NEPERCENT 65.3 11/02/2024    LYPERCENT 20.6 11/02/2024    NE 4.34 11/02/2024    LYMABS 1.37 11/02/2024

## 2025-02-07 NOTE — TELEPHONE ENCOUNTER
Left detailed voicemail on patient's phone regarding the below message:    Remind pt to get repeat labs in the next week or so. Ok during Dr. Cuenca's appointment.        Patient was asked to call the office back with any question she may have   Partial Purse String (Simple) Text: Given the location of the defect and the characteristics of the surrounding skin a simple purse string closure was deemed most appropriate.  Undermining was performed circumferentially around the surgical defect.  A purse string suture was then placed and tightened. Wound tension of the circular defect prevented complete closure of the wound.

## 2025-02-09 ENCOUNTER — HOSPITAL ENCOUNTER (OUTPATIENT)
Dept: CT IMAGING | Age: 70
Discharge: HOME | End: 2025-02-09
Attending: INTERNAL MEDICINE
Payer: MEDICARE

## 2025-02-09 ENCOUNTER — HOSPITAL ENCOUNTER (OUTPATIENT)
Dept: CT IMAGING | Age: 70
End: 2025-02-09
Attending: INTERNAL MEDICINE
Payer: MEDICARE

## 2025-02-09 DIAGNOSIS — C34.90 PRIMARY MALIGNANT NEOPLASM OF LUNG METASTATIC TO OTHER SITE, UNSPECIFIED LATERALITY (HCC): ICD-10-CM

## 2025-02-09 LAB
CREAT BLD-MCNC: 0.5 MG/DL
EGFRCR SERPLBLD CKD-EPI 2021: 103 ML/MIN/1.73M2 (ref 60–?)

## 2025-02-09 PROCEDURE — 74177 CT ABD & PELVIS W/CONTRAST: CPT | Performed by: INTERNAL MEDICINE

## 2025-02-09 PROCEDURE — 82565 ASSAY OF CREATININE: CPT

## 2025-02-09 PROCEDURE — 71260 CT THORAX DX C+: CPT | Performed by: INTERNAL MEDICINE

## 2025-02-10 ENCOUNTER — OFFICE VISIT (OUTPATIENT)
Age: 70
End: 2025-02-10
Attending: INTERNAL MEDICINE
Payer: MEDICARE

## 2025-02-10 VITALS
WEIGHT: 104 LBS | HEIGHT: 64.02 IN | SYSTOLIC BLOOD PRESSURE: 139 MMHG | DIASTOLIC BLOOD PRESSURE: 79 MMHG | HEART RATE: 80 BPM | OXYGEN SATURATION: 94 % | BODY MASS INDEX: 17.75 KG/M2 | TEMPERATURE: 98 F | RESPIRATION RATE: 16 BRPM

## 2025-02-10 DIAGNOSIS — Z08 ENCOUNTER FOR FOLLOW-UP EXAMINATION AFTER COMPLETED TREATMENT FOR MALIGNANT NEOPLASM: ICD-10-CM

## 2025-02-10 DIAGNOSIS — R91.1 PULMONARY NODULE 1 CM OR GREATER IN DIAMETER: ICD-10-CM

## 2025-02-10 DIAGNOSIS — C34.90 PRIMARY MALIGNANT NEOPLASM OF LUNG METASTATIC TO OTHER SITE, UNSPECIFIED LATERALITY (HCC): Primary | ICD-10-CM

## 2025-02-10 LAB
ALBUMIN SERPL-MCNC: 4.1 G/DL (ref 3.2–4.8)
ALBUMIN/GLOB SERPL: 1.4 {RATIO} (ref 1–2)
ALP LIVER SERPL-CCNC: 90 U/L
ALT SERPL-CCNC: 11 U/L
ANION GAP SERPL CALC-SCNC: 3 MMOL/L (ref 0–18)
AST SERPL-CCNC: 21 U/L (ref ?–34)
BASOPHILS # BLD AUTO: 0.06 X10(3) UL (ref 0–0.2)
BASOPHILS NFR BLD AUTO: 1.1 %
BILIRUB SERPL-MCNC: 0.5 MG/DL (ref 0.2–1.1)
BUN BLD-MCNC: 10 MG/DL (ref 9–23)
CALCIUM BLD-MCNC: 9.3 MG/DL (ref 8.7–10.6)
CHLORIDE SERPL-SCNC: 100 MMOL/L (ref 98–112)
CO2 SERPL-SCNC: 29 MMOL/L (ref 21–32)
CREAT BLD-MCNC: 0.49 MG/DL
EGFRCR SERPLBLD CKD-EPI 2021: 103 ML/MIN/1.73M2 (ref 60–?)
EOSINOPHIL # BLD AUTO: 0.11 X10(3) UL (ref 0–0.7)
EOSINOPHIL NFR BLD AUTO: 2 %
ERYTHROCYTE [DISTWIDTH] IN BLOOD BY AUTOMATED COUNT: 15 %
GLOBULIN PLAS-MCNC: 3 G/DL (ref 2–3.5)
GLUCOSE BLD-MCNC: 100 MG/DL (ref 70–99)
HCT VFR BLD AUTO: 37.4 %
HGB BLD-MCNC: 11.9 G/DL
IMM GRANULOCYTES # BLD AUTO: 0 X10(3) UL (ref 0–1)
IMM GRANULOCYTES NFR BLD: 0 %
LYMPHOCYTES # BLD AUTO: 1.12 X10(3) UL (ref 1–4)
LYMPHOCYTES NFR BLD AUTO: 20.5 %
MCH RBC QN AUTO: 29 PG (ref 26–34)
MCHC RBC AUTO-ENTMCNC: 31.8 G/DL (ref 31–37)
MCV RBC AUTO: 91.2 FL
MONOCYTES # BLD AUTO: 0.54 X10(3) UL (ref 0.1–1)
MONOCYTES NFR BLD AUTO: 9.9 %
NEUTROPHILS # BLD AUTO: 3.64 X10 (3) UL (ref 1.5–7.7)
NEUTROPHILS # BLD AUTO: 3.64 X10(3) UL (ref 1.5–7.7)
NEUTROPHILS NFR BLD AUTO: 66.5 %
OSMOLALITY SERPL CALC.SUM OF ELEC: 273 MOSM/KG (ref 275–295)
PLATELET # BLD AUTO: 283 10(3)UL (ref 150–450)
POTASSIUM SERPL-SCNC: 4.4 MMOL/L (ref 3.5–5.1)
PROT SERPL-MCNC: 7.1 G/DL (ref 5.7–8.2)
RBC # BLD AUTO: 4.1 X10(6)UL
SODIUM SERPL-SCNC: 132 MMOL/L (ref 136–145)
T4 FREE SERPL-MCNC: 1 NG/DL (ref 0.8–1.7)
TSI SER-ACNC: 3.25 UIU/ML (ref 0.55–4.78)
WBC # BLD AUTO: 5.5 X10(3) UL (ref 4–11)

## 2025-02-10 NOTE — PROGRESS NOTES
Cancer Center Progress Note  Patient Name: Tatyana Christie   YOB: 1955   Medical Record Number: [id number]   CSN: [id number]   Attending Physician: Vishnu Cuenca M.D.       Date of Visit: 2/10/25       Chief Complaint:  No chief complaint on file.       Oncologic History:  Tatyana Christie is a 69 year old female  referred by Dr. Kaur for evaluation and treatment of her lung cancer.  The patient reports that she was in her usual state of health until July, 2020, when she contracted COVID19. She reports that she recovered, but has had intermittent dizziness and palpitations since then.  The workup included a CXR that revealed abnormalities of the RUL and LLL.  This prompted CT that revealed a RUL mass and a LLL mass.  Subsequent PET scan confirmed PET avid masses in the RUL and LLL, as well as a positive pre-carinal LN. She underwent a CT guided biopsy of the RUL mass that revealed adenocarcinoma consistent with a lung primary.  EGFR and ALK were negative, ROS-1 was equivocal, and PDL-1 was 11-20%.  She suffered a pneumothorax as a result of the biopsy and was hospitalized with a pigtail catheter placement.   She repors that an echo was normal, but she continues to complain of intermittent palpitations and dizziness. She has been referred to cardiology for evaluation.   She has no CP or SOB. No palpable LAD or masses.  No F/C/NS.  She has a significant smoking history.        She underwent MRI brain that was negative for metastases. Repeat CT of the chest revealed progression of the RUL mass, LLL mass, and adenopathy, but no new lesions.    She underwent navigational bronchoscopy with EBUS guided biopsy of a 4R node and the LLL mass. She tolerated the procedure well.  The jeramie biopsy was positive for NSCLCa. The LLL mass was read as normal lung tissue.   Her case was reviewed at the [facility] Thoracic tumor board, where CT guided biopsy of the LLL mass was recommended.   The patient steadfastly  refused to consider any further biopsy or workup.  After discussing treatment options for presumed stage IV lung cancer, we settled on combined immunotherapy.  She developed severe arthritis, chronic fever, and rash that prompted holding the immunotherapy.     Symptoms were somewhat relieved with steroids.  She was seen by rheumatology and started on Leflunamide.     History of Present Illness:  Pt is here for follow up. She has no new complaints.     Performance Status:  ECOG 1    Past Medical History:  Past Medical History:    Anesthesia complication    stated they had a hard time waking pt up after ankle surgery     Arrhythmia    Arthritis    Back problem    Cancer (HCC)    Cataract    Esophageal reflux    Hyperlipidemia    Lung cancer (HCC)    Adenocarcinoma    Osteoarthritis    Pneumothorax, right    Shortness of breath    Visual impairment    contacts and glasses       Past Surgical History:  Past Surgical History:   Procedure Laterality Date    Cataract Right 2023    Cataract Left 2023    Fracture surgery Right     right ankle fracture surgery with pins and plates     Other surgical history      laser surgery on retina       Family History:  Family History   Problem Relation Age of Onset    Heart Disorder Mother     Dementia Mother     Stroke Mother     Diabetes Father        Social History:  Social History     Socioeconomic History    Marital status:      Spouse name: Not on file    Number of children: Not on file    Years of education: Not on file    Highest education level: Not on file   Occupational History    Not on file   Tobacco Use    Smoking status: Former     Current packs/day: 0.00     Average packs/day: 1.6 packs/day for 50.7 years (80.0 ttl pk-yrs)     Types: Cigarettes     Start date: 1968     Quit date: 2010     Years since quittin.3    Smokeless tobacco: Never   Vaping Use    Vaping status: Never Used   Substance and Sexual Activity    Alcohol use: Not Currently     Drug use: Never    Sexual activity: Not on file   Other Topics Concern    Caffeine Concern Yes    Exercise No    Seat Belt Yes    Special Diet Yes    Stress Concern No    Weight Concern Yes   Social History Narrative    Not on file     Social Drivers of Health     Food Insecurity: Not on file   Transportation Needs: Not on file   Housing Stability: Not on file       Current Medications:    Current Outpatient Medications:     LEFLUNOMIDE 20 MG Oral Tab, Take 1 tablet (20 mg total) by mouth daily., Disp: 90 tablet, Rfl: 0    NON FORMULARY, Green tea supplement, Disp: , Rfl:     Acetaminophen (TYLENOL ARTHRITIS PAIN OR), Take by mouth., Disp: , Rfl:     Nettle, Urtica Dioica, (NETTLE LEAF OR), Take by mouth., Disp: , Rfl:     Brit, Zingiber officinalis, (BRIT OR), Take by mouth. Oral brit supplement 1x daily, Disp: , Rfl:     Turmeric (QC TUMERIC COMPLEX OR), Take by mouth., Disp: , Rfl:     Multiple Vitamins-Minerals (MULTI-VITAMIN/MINERALS) Oral Tab, Take 1 tablet by mouth daily., Disp: , Rfl:     Allergies:  No Known Allergies     Review of Systems:    Constitutional No chills, night sweats, or weight loss.   Eyes No significant visual difficulties. No diplopia. No yellowing.   Hematologic/Lymphatic Normal - No easy bruising or bleeding.  No tender or palpable lymph nodes.   Respiratory No cough or hemoptysis.   Cardiovascular No anginal chest pain, palpitations or orthopnea.   Gastrointestinal No nausea, vomiting, diarrhea, GI bleeding. NL appetite.   Genitorurinary  No hematuria, dysuria, abnormal bleeding, or incontinence.   Integumentary No yellowing of the skin   Neurologic No headache, blurred vision, and no areas of focal weakness. Normal gait.   Psychiatric No insomnia, depression, olayinka or mood swings.       Vital Signs:  There were no vitals taken for this visit.      Physical Examination:    Constitutional Normal - Mood and affect appropriate. Appears close to chronological age. Well nourished. Well  developed.   Eyes Normal - Conjunctivae and sclerae are clear and without icterus. Pupils are reactive and equal.   Hematologic/Lymphatic Normal - No petechiae or purpura.  No tender or palpable lymph nodes in the cervical, supraclavicular, axillary or inguinal area.   Respiratory Normal - Lungs are clear to auscultation, no wheezing.   Cardiovascular Normal - Regular rate and rhythm, no murmurs.   Abdomen Normal - Non-tender, non-distended, no masses, ascites or hepatosplenomegaly.    Extremities No edema.    Integumentary Erythematous rash of the abdomen w/o blisters or excoriation. Improved.    Neurologic Normal - No sensory or motor deficits, normal cerebellar function, cranial nerves intact.   Psychiatric Normal - A&Ox3. Coherent speech. Verbalizes understanding of our discussions today.           Laboratory:  Recent Labs     02/10/25  0935   RBC 4.10   HGB 11.9 L   HCT 37.4   MCV 91.2   MCH 29.0   MCHC 31.8   RDW 15.0   NEPRELIM 3.64   WBC 5.5   .0     Recent Labs     02/10/25  0935    H   BUN 10   CREATSERUM 0.49 L   CA 9.3   ALB 4.1    L   K 4.4      CO2 29.0   ALKPHO 90   AST 21   ALT 11   BILT 0.5   TP 7.1         Radiology:  CT C/A/P:  CONCLUSION:  INCREASE in the size of left upper lobe neoplasm.  Possible developing subtle nodule right adrenal gland, attention to this area on subsequent imaging advised.                Pathology:  Final Diagnosis:   Needle core biopsy of right upper lobe lung mass:  -POSITIVE for adenocarcinoma; consistent with lung primary.           Final Diagnosis:   A.  EBUS guided fine-needle aspiration of 4R lymph node:  -Adequate for evaluation.  -POSITIVE for metastatic adenocarcinoma; consistent with known lung primary.  -(See comment).    B.  EBUS guided fine-needle aspiration of left lower lobe lung mass:  -Adequate for evaluation.  -No cytologic evidence of malignancy.  -Specimen composed predominantly of benign bronchial epithelial cells.    C.   Cytospin smears and cell block from bronchoalveolar lavage, left lower lobe of lung:  -Adequate for evaluation.  -No cytologic evidence of malignancy.  -Specimen composed of benign alveolar macrophages, respiratory epithelial cells and mixed inflammatory cells.     Final Diagnosis:   Endobronchial biopsy, left lower lobe lung:   -Fragments of benign lung tissue with no significant pathologic abnormalities.   -No evidence of malignancy.          Impression and Plan:      Non-small cell lung cancer: The patient's CT demonstrates progression of the RUL and LLL lesions, concerning for both being malignant.  Her case was reviewed at the multidisciplinary thoracic tumor board, where the possibility of two curable primaries was raised. Recommendation was made for navigational bronchoscopy to biopsy the mediastinal nodes and LLL mass. The 4R LN is positive for adenocarcinoma, c/w at least stage IIIA disease. The normal pathology of the LLL is discordant with the CT and PET findings of an enlarging PET positive mass.  We discussed that repeat biopsy was recommended.  She steadfastly refuses additional biopsies.  In the absence of the biopsy, definitively demonstrating benign etiology of the LLL, I recommend that we proceed as if this is Stage IV disease, as it appears on PET. We discussed treatment options, including palliative chemo, chemo-immunotherapy with ipi/nivo and 2 cycles of chemotherapy, or just immunotherapy with Ipi/Nivo. With PDL-1 only 11-20%, I don not recommend Keyrtuda alone.  She does not want to add the risk of chemo side effects to the risk of immunotherapy and has elected to proceed with Combined immunotherapy.  We reviewed the risks, benefits, and side effects, and she agreed to proceed. She complained of fatigue and fever that were intolerable, prompting holding therapy. Her CT, at that time, demonstrated response to therapy. She also developed a rash on her abdomen. We discussed a course of prednisone  to calm the side effects, but she initially declined, in favor of CBD gummies. She eventually agreed to 50mg daily x5 days, and when she improved, a prolonged wean. She is better, but symptoms wax and wane, She is following with rheumatology. The repeat CT demonstrates worsening of the lung nodule.Will refer to rad onc for possible SBRT.       Shoulder arthritis and arthralgia: Much improved. Continue to follow with rheumatology. Continue leflunomide per rheumatlogy.        Planned Follow Up:  6 months    Electronically Signed by:    Vishnu Cuenca M.D.  [facility]

## 2025-02-10 NOTE — PROGRESS NOTES
Pt here for follow up.   No new complaints.    Outpatient Oncology Care Plan  Problem list:  knowledge deficit    Problems related to:    disease/disease progression    Interventions:  provided general teaching    Expected outcomes:  understands plan of care    Progress towards outcome:  making progress    Education Record    Learner:  Patient  Barriers / Limitations:  None  Method:  Brief focused  Outcome:  Shows understanding  Comments:

## 2025-02-11 ENCOUNTER — TELEPHONE (OUTPATIENT)
Age: 70
End: 2025-02-11

## 2025-02-14 NOTE — PROGRESS NOTES
Nursing Consultation Note  Patient: Tatyana Christie  YOB: 1955  Age: 69 year old  Radiation Oncologist: Dr. Conor Jimenez  Referring Physician: Vishnu Cuenca  Diagnosis:[unfilled]  Consult Date: 2/14/2025      Chemotherapy: N/A  Labs: Reviewed  Imaging: Reviewed  Is the patient of child-bearing age?         No  Has the patient received radiation therapy in the past? no  Does the patient have an implantable device?No   Patient has/has had:     1. Assistive Devices: N/A      Vital Signs:   Vitals:    02/17/25 1018   BP: 132/80   Pulse: 69   Resp: 18   Temp: 97.3 °F (36.3 °C)   ,   Wt Readings from Last 6 Encounters:   02/17/25 48.2 kg (106 lb 3.2 oz)   02/10/25 47.2 kg (104 lb)   12/16/24 47.5 kg (104 lb 12.8 oz)   10/21/24 47.5 kg (104 lb 12.8 oz)   08/12/24 48.5 kg (107 lb)   07/02/24 47.6 kg (105 lb)       Nursing Note: Hx of lung cancer.  Pt had COVID 19 in 7/2020. Afterward started having dizziness and palpitations. Workup included CXR and then CT of chest revealing RUL and LLL mass. Biopsy of RUL lung mass done 10/6/2020- + adenocarcinoma c/w lung primary. Began combined immunotherapy from Dec 2020- June 2021. Since then monitoring with CT. CT c/a/p done 2/9/25- REINALDO increased size of nodule. Here for consult today. Pt feels well today. Has some increased SOB with moderate exertion. No cough.     Review of Systems   Constitutional:  Positive for appetite change.   HENT: Negative.     Eyes: Negative.    Respiratory:  Positive for shortness of breath.    Cardiovascular:  Positive for palpitations.   Gastrointestinal:  Positive for constipation.   Endocrine: Negative.    Genitourinary: Negative.    Musculoskeletal:  Positive for arthralgias, back pain and neck pain.   Skin: Negative.    Allergic/Immunologic: Positive for food allergies.   Neurological:  Positive for dizziness and headaches.   Hematological: Negative.    Psychiatric/Behavioral: Negative.            Allergies:  Allergies[1]    Current Outpatient Medications   Medication Sig Dispense Refill    Resveratrol 100 MG Oral Cap Take 200 mg by mouth daily.      LEFLUNOMIDE 20 MG Oral Tab Take 1 tablet (20 mg total) by mouth daily. 90 tablet 0    NON FORMULARY Green tea supplement      Acetaminophen (TYLENOL ARTHRITIS PAIN OR) Take by mouth.      Nettle, Urtica Dioica, (NETTLE LEAF OR) Take by mouth.      Sam, Zingiber officinalis, (SAM OR) Take by mouth. Oral sam supplement 1x daily      Turmeric (QC TUMERIC COMPLEX OR) Take by mouth.      Multiple Vitamins-Minerals (MULTI-VITAMIN/MINERALS) Oral Tab Take 1 tablet by mouth daily.         Preferred Pharmacy:    [id number] - [place], [place] - [address] [phone], [phone]  [address] [place] [address]  Phone: [phone] Fax: [phone]      Past Medical History:    Anesthesia complication    stated they had a hard time waking pt up after ankle surgery     Arrhythmia    Arthritis    Back problem    Cancer (HCC)    Cataract    Esophageal reflux    Hyperlipidemia    Lung cancer (HCC)    Adenocarcinoma    Osteoarthritis    Pneumothorax, right    Shortness of breath    Visual impairment    contacts and glasses       Past Surgical History:   Procedure Laterality Date    Cataract Right 05/2023    Cataract Left 06/2023    Fracture surgery Right     right ankle fracture surgery with pins and plates     Other surgical history      laser surgery on retina       Social History     Socioeconomic History    Marital status:      Spouse name: Not on file    Number of children: 1    Years of education: Not on file    Highest education level: Not on file   Occupational History    Occupation: CNA     Comment: Retired   Tobacco Use    Smoking status: Former     Current packs/day: 0.00     Average packs/day: 1.6 packs/day for 50.7 years (80.0 ttl pk-yrs)     Types: Cigarettes     Start date: 1/20/1968     Quit date: 9/30/2010     Years since  quittin.3    Smokeless tobacco: Never   Vaping Use    Vaping status: Never Used   Substance and Sexual Activity    Alcohol use: Not Currently    Drug use: Never    Sexual activity: Not on file   Other Topics Concern    Caffeine Concern Yes    Exercise No    Seat Belt Yes    Special Diet Yes    Stress Concern No    Weight Concern Yes   Social History Narrative    - lives with     Retired CNA    1 grown Son     Social Drivers of Health     Food Insecurity: Not on file   Transportation Needs: Not on file   Housing Stability: Not on file       ECOG:  Grade 0 - Fully active, able to carry on all predisease activities without restrictions.      Education:  Knowledge Deficit Plan Of Care:    Problem:  Knowledge Deficit    Problems related to:    Radiation therapy    Interventions:  Assess patient knowledge level  Assess knowledge needs  Instruct on treatment planning  Instruct on radiation therapy appointment scheduling  Instruct on purpose of radiation therapy  Instruct on side effects of radiation therapy    Expected Outcomes:  Knowledge of radiation therapy    Progress Toward Outcome:  Making progress    Pamphlets/Handouts Given to Patient:  Understanding radiation therapy      Are ADL's met?  Yes  Does patient feel safe in their environment?  Yes  Care decisions:  Patient and/or surrogate IS involved in care decisions.  Advanced directives:  Patient DOES NOT have advanced directives.  Transportation:  Adequate transportation available for expected visits    Pain: 7/10 neck, shoulders, back, knee, and ankle - arthritis pain         [1] No Known Allergies

## 2025-02-17 ENCOUNTER — HOSPITAL ENCOUNTER (OUTPATIENT)
Dept: RADIATION ONCOLOGY | Facility: HOSPITAL | Age: 70
End: 2025-02-17
Attending: RADIOLOGY
Payer: MEDICARE

## 2025-02-17 VITALS
TEMPERATURE: 97 F | HEART RATE: 69 BPM | WEIGHT: 106.19 LBS | DIASTOLIC BLOOD PRESSURE: 80 MMHG | RESPIRATION RATE: 18 BRPM | OXYGEN SATURATION: 95 % | BODY MASS INDEX: 18 KG/M2 | SYSTOLIC BLOOD PRESSURE: 132 MMHG

## 2025-02-17 DIAGNOSIS — C34.12 MALIGNANT NEOPLASM OF UPPER LOBE OF LEFT LUNG (HCC): Primary | ICD-10-CM

## 2025-02-17 PROCEDURE — 99214 OFFICE O/P EST MOD 30 MIN: CPT

## 2025-02-17 NOTE — CONSULTS
[facility]    PATIENT'S NAME: ELEAZAR SHAFFER   RADIATION ONCOLOGIST: Conor Jimenez M.D.   PATIENT ACCOUNT #: [id number] LOCATION: [facility]   [facility]   MEDICAL RECORD #: [id number] YOB: 1955   CONSULTATION DATE: 02/17/2025       RADIATION ONCOLOGY CONSULTATION    REFERRING PHYSICIAN:  Vishnu Cuenca MD    DIAGNOSIS:  Probable left upper lobe lung malignancy.      HISTORY OF PRESENT ILLNESS:  The patient is a 69-year-old female with a history of lung cancer dating back to 2020.  At that point, she had COVID-19 and workup afterward demonstrated abnormalities in the right upper lobe and left lower lobe.  A subsequent PET scan confirmed PET avid masses in the right upper lobe and left lower lobe as well as a positive precarinal lymph node.  A CT-guided biopsy of the right upper lobe mass was done and showed adenocarcinoma consistent with a lung primary.  She did suffer a pneumothorax requiring hospitalization.  A navigational bronchoscopy was done and biopsy of the 4R lymph node was positive for a non-small-cell carcinoma.  The left lower lobe lung mass was read as normal tissue on that biopsy.  It was uncertain as to whether or not this was appropriately biopsied, so a CT-guided biopsy was recommended but the patient refused.  She ultimately was treated with combined immunotherapy and she did well for a bit but then developed some significant side effects and the immunotherapy was held.  Dr. Cuenca has been following her with imaging since that time and she has done reasonably well but her most recent scan showed increasing size in the left upper lobe mass.  Her most recent imaging shows this to be a 1.5 cm solid mass with a cavitary component medially.  The mass is now 1.5 cm in size and had been 1.0 cm on the prior scan 6 months ago.  There does not appear to be any pathologically involved lymph nodes.  Based upon the growing mass with a high likelihood that this represents a malignancy,  the patient is then referred to Radiation Oncology for a discussion regarding potential treatment.    She otherwise essentially remains asymptomatic from this particular lesion.  She denies any cough, shortness of breath, hemoptysis, weight loss, changes in appetite, or other significant complaints.    PAST MEDICAL HISTORY:  The patient has a past history of lung cancer as per HPI.  She was treated initially with the thought that she may have metastatic disease and received immunotherapy.  This was discontinued due to side effects but the patient has been fairly well controlled since that time.  Arrhythmias, cataracts, arthritis, reflux, hyperlipidemia.    PAST SURGICAL HISTORY:  Right ankle fracture surgery repair and bilateral cataracts.    MEDICATIONS:  Leflunomide.    ALLERGIES:  No known drug allergies.    FAMILY HISTORY:  Negative for malignancy.    SOCIAL HISTORY:  The patient does have an 80+ pack-year history of smoking and quit about 15 years ago.  She reports no alcohol use.  She is  and seen in consultation with her .  She denies transportation-related difficulties.    REVIEW OF SYSTEMS:  A 14-point review of systems is performed.  Pertinent positives and negatives are as per HPI.      PHYSICAL EXAMINATION:    GENERAL:  A 69-year-old female who is pleasant, cooperative, and alert, awake, oriented x3.  She is in no acute distress.  She has an ECOG performance score of 1.  VITAL SIGNS:  Blood pressure of 132/80, pulse of 69, respiratory rate of 18, and temperature 97.3.  Her weight is 106 pounds.  HEENT:  Pupils are equal, round, react to light with accommodation and the extraocular movements are intact.  The oral cavity is without ulceration or lesions.  NECK:  Supple with no lymphadenopathy.  LUNGS:  Clear to auscultation bilaterally.  HEART:  Regular rate and rhythm, with normal S1, S2, and no audible murmurs.  LYMPHATICS:  There is no supraclavicular, axillary, inguinal  lymphadenopathy.  ABDOMEN:  Soft, nontender, and nondistended with normoactive bowel sounds and no hepatosplenomegaly.  EXTREMITIES:  Without clubbing, cyanosis, edema.  NEUROLOGIC:  Cranial nerves II-XII are grossly intact.  There are no focal deficits.    IMPRESSION:  This is a 69-year-old female with a history of lung cancer dating back to 2020.  At that time, it was felt that she may have metastatic disease and she was refusing biopsy of a lesion in the contralateral lung.  She was treated with combined immunotherapy and had a good response.  The immunotherapy had to be discontinued due to side effects.  She has done well, however, since that time but recent imaging shows a growing mass in the left upper lobe which appears highly suspicious.  The patient refuses biopsy and is referred to Radiation Oncology to consider ablative therapy.    RECOMMENDATIONS:  I do feel the patient is a good candidate for treatment to this enlarging lesion.  This appears highly likely to represent a bronchogenic malignancy.  As this has been growing over time and radiographically appears quite suspicious, it appears likely to be a malignant process.  The patient refuses to discuss biopsy based upon her difficulties with biopsies in the past including pneumothoraces.    It is difficult to determine whether or not this represents part of her original disease or a new primary.  It appears more likely to represent a separate and new primary based upon the location and appearance.  To that extent, I then would recommend ablative radiotherapy to this lesion.  I am quite optimistic that such treatment will eradicate her disease in this location.  I would recommend 5000 cGy in 5 fractions to an area encompassing this tumor.  I am optimistic that she would tolerate this well.  I would not expect much in way of significant morbidity.  It is possible that her preexisting disease will likely ultimately regrow and spread but she has been  controlled for some time yet.    I then had a long talk with the patient and her  regarding the potential side effects of this treatment.  I told her that she should tolerate these treatments well and I do not expect much in the way of morbidity.  There is the possibility of some cough, shortness of breath, and hemoptysis and the patient may have some fatigue during therapy.  These side effects should resolve.  In the long run, there is the remote possibility of radiation pneumonitis but the patient may have some chest wall discomfort, rib pain, or chest wall tenderness.  Following this long and thorough discussion of all the risks and benefits of treatment, the patient indicated that she understood all these issues and does wish to proceed with treatment as I previously dictated.    We, therefore, will schedule the patient for simulation soon with the intent to begin her treatment shortly thereafter.      Thank you very much for allowing me the opportunity to participate in the care of this patient.  If there should be any questions regarding the radiotherapy, please feel free to contact me at any time.    Dictated By Conor Jimenez M.D.  d: 02/17/2025 14:14:32  t: 02/17/2025 14:36:57  [facility] [id number]  NAD/    cc: Vishnu Cuenca M.D.   Mirtha Finley DO

## 2025-02-18 ENCOUNTER — DOCUMENTATION ONLY (OUTPATIENT)
Age: 70
End: 2025-02-18

## 2025-02-25 ENCOUNTER — HOSPITAL ENCOUNTER (OUTPATIENT)
Dept: RADIATION ONCOLOGY | Facility: HOSPITAL | Age: 70
Discharge: HOME | End: 2025-02-25
Attending: RADIOLOGY
Payer: MEDICARE

## 2025-03-01 ENCOUNTER — HOSPITAL ENCOUNTER (OUTPATIENT)
Dept: RADIATION ONCOLOGY | Facility: HOSPITAL | Age: 70
Discharge: HOME | End: 2025-03-01
Attending: RADIOLOGY
Payer: MEDICARE

## 2025-03-10 ENCOUNTER — HOSPITAL ENCOUNTER (OUTPATIENT)
Dept: RADIATION ONCOLOGY | Facility: HOSPITAL | Age: 70
Discharge: HOME | End: 2025-03-10
Attending: RADIOLOGY
Payer: MEDICARE

## 2025-03-10 PROCEDURE — 77373 STRTCTC BDY RAD THER TX DLVR: CPT | Performed by: RADIOLOGY

## 2025-03-12 ENCOUNTER — HOSPITAL ENCOUNTER (OUTPATIENT)
Dept: RADIATION ONCOLOGY | Facility: HOSPITAL | Age: 70
Discharge: HOME | End: 2025-03-12
Attending: RADIOLOGY
Payer: MEDICARE

## 2025-03-12 PROCEDURE — 77373 STRTCTC BDY RAD THER TX DLVR: CPT | Performed by: RADIOLOGY

## 2025-03-14 ENCOUNTER — HOSPITAL ENCOUNTER (OUTPATIENT)
Dept: RADIATION ONCOLOGY | Facility: HOSPITAL | Age: 70
Discharge: HOME | End: 2025-03-14
Attending: INTERNAL MEDICINE
Payer: MEDICARE

## 2025-03-14 PROCEDURE — 77373 STRTCTC BDY RAD THER TX DLVR: CPT | Performed by: RADIOLOGY

## 2025-03-17 ENCOUNTER — HOSPITAL ENCOUNTER (OUTPATIENT)
Dept: RADIATION ONCOLOGY | Facility: HOSPITAL | Age: 70
Discharge: HOME | End: 2025-03-17
Attending: RADIOLOGY
Payer: MEDICARE

## 2025-03-17 ENCOUNTER — HOSPITAL ENCOUNTER (OUTPATIENT)
Dept: RADIATION ONCOLOGY | Facility: HOSPITAL | Age: 70
End: 2025-03-17
Attending: RADIOLOGY
Payer: MEDICARE

## 2025-03-17 VITALS
TEMPERATURE: 96 F | DIASTOLIC BLOOD PRESSURE: 85 MMHG | RESPIRATION RATE: 18 BRPM | OXYGEN SATURATION: 98 % | SYSTOLIC BLOOD PRESSURE: 133 MMHG | HEART RATE: 80 BPM

## 2025-03-17 DIAGNOSIS — C34.12 MALIGNANT NEOPLASM OF UPPER LOBE OF LEFT LUNG (HCC): Primary | ICD-10-CM

## 2025-03-17 PROCEDURE — 77373 STRTCTC BDY RAD THER TX DLVR: CPT | Performed by: RADIOLOGY

## 2025-03-17 NOTE — PROGRESS NOTES
[facility] Radiation Treatment Management Note 1-5    Patient:  Tatyana Christie  Age:  69 year old  Visit Diagnosis:    1. Malignant neoplasm of upper lobe of left lung (HCC)      Primary Rad/Onc:  Dr. Conor Jimenez    Site Delivered Dose (cGy) Prescribed Dose (cGy) Fraction #   SBRT REINALDO 4000 5000 4/5     First treatment date:  3/10/25  Concurrent chemotherapy: N/A        2/17/2025     9:57 AM 2/17/2025    10:18 AM 3/17/2025    11:50 AM   Oncology Vitals   Weight 106 lb 3.2 oz     Weight 48.172 kg     BSA (m2) 1.5 m2     BMI 18.22 kg/m2     BP  132/80 133/85   Pulse  69 80   Resp  18 18   Temp  97.3 °F (36.3 °C) 96.4 °F (35.8 °C)   SpO2  95 % 98 %   Pain Score  7 0        Toxicities:  Fatigue Grade 0= None  Dysphagia Grade 0= None  Dyspepsia Grade 0= None  Nausea Grade 0= None  Vomiting Grade 0= None  Cough Grade 0= None  Dyspnea Grade 2= Shortness of breath with minimal exertion; limiting instrumental ADL     Nursing Note:  Pt c/o some intermittent left upper back pain that was there before treatment  Improves with position change  SOB unchanged      Linda SANDHU RN    Physician Note:  Subjective:  Diong well, no issues or c/o related to RT.      Objective:  Unchanged      Treatment setup imaging have been reviewed:  Yes    Assessment/Plan:    Continue radiotherapy per plan    Completes Wed    Next visit:  3 months for f/u with CT    Dr. Conor Jimenez

## 2025-03-17 NOTE — PATIENT INSTRUCTIONS
- WE WILL CALL TO SCHEDULE YOUR FOLLOW-UP APPOINTMENT WITH DR. PRECIOUS MASON IN 3 MONTHS AFTER YOU CT    - FOLLOW-UP WITH DR. KEITA    - CALL CENTRAL SCHEDULING AT [phone] TO SCHEDULE CT CHEST IN 3 MONTHS    - CALL THE NURSING LINE AT [phone] IF YOU HAVE ANY QUESTIONS/CONCERNS REGARDING RADIATION THERAPY

## 2025-03-19 ENCOUNTER — HOSPITAL ENCOUNTER (OUTPATIENT)
Dept: RADIATION ONCOLOGY | Facility: HOSPITAL | Age: 70
Discharge: HOME | End: 2025-03-19
Attending: RADIOLOGY
Payer: MEDICARE

## 2025-03-19 PROCEDURE — 77373 STRTCTC BDY RAD THER TX DLVR: CPT | Performed by: RADIOLOGY

## 2025-03-20 PROCEDURE — 77336 RADIATION PHYSICS CONSULT: CPT | Performed by: RADIOLOGY

## 2025-03-24 NOTE — PROGRESS NOTES
[facility]    PATIENT'S NAME: ELEAZAR SHAFFER   RADIATION ONCOLOGIST: Conor Jimenez M.D.   PATIENT ACCOUNT #: [id number] LOCATION: [facility]CRAD   [facility]   MEDICAL RECORD #: [id number] YOB: 1955   DATE: 03/19/2025       RADIATION ONCOLOGY TREATMENT SUMMARY    REFERRING PHYSICIAN:  Vishnu Cuenca MD    DIAGNOSIS:  Probable left upper lobe lung malignancy.    HISTORY:  The patient is a 69-year-old female with a history of lung cancer dating back to 2020.  At that time, she was found to have abnormalities in the right upper lobe and left lower lobe.  Subsequent PET scan confirmed activity in both of these masses as well as a precarinal lymph node.  A biopsy of the right upper lobe mass was positive for adenocarcinoma, though the patient did suffer a resultant pneumothorax.  A navigational biopsy and bronchoscopy of the 4R lymph node was also positive.  The left lower lobe mass was biopsied, but it was questionable as to whether or not this truly was a successful biopsy.  It was read as negative.  A CT-guided biopsy was recommended, but the patient refused based upon her prior pneumothorax.  She ultimately was assumed to have metastatic disease and was treated with combined immunotherapy.  She did well for awhile but then began having significant side effects.  Dr. Cuenca has been following her with imaging since that time and she has done well, but her most recent scan showed increasing size in the left upper lung mass.  This now shows to be a 1.5 cm mass and it had been 1.0 cm 6 months ago.  There was no evidence of any pathologically enlarged lymph nodes.  Based upon the enlarging mass with the high likelihood that this represented a malignant process, the patient was then referred to Radiation Oncology for a discussion regarding treatment.    TECHNICAL SUMMARY:  The patient was treated to the left upper lobe mass to a dose of 5000 cGy in 1000 cGy daily fractions.  This was done with  ablative radiotherapeutic techniques with 6 MV photons.  Treatments began on 03/10/2025, and completed on 03/19/2025, for a total of 9 elapsed days during which she received all 5 prescribed radiation treatments.     TREATMENT SUMMARY:  The patient was treated with radiation therapy to the left upper lobe mass.  This appeared to be growing when the remainder of her disease had been quiescent for quite some time.  As to whether or not this represented a new primary or the original process was difficult to determine, but regardless, this appeared to be the only area of progression.  She refused biopsy based upon her prior right pneumothorax and the significant side effects that resulted from that procedure.  I, therefore, opted to proceed with definitive treatment to 5000 cGy in 5 fractions as above.    TOLERANCE:  The patient tolerated the treatment well and had no particular side effects or difficulties from therapy.  She completed her treatments without breaks or interruptions in therapy.    FOLLOWUP AND PLAN:  The patient was given a followup appointment to see me again in 3 months and will get a CT scan prior to that visit.  She was told to contact my office sooner if she should have any problems or questions prior to that time.    Thank you very much for allowing me the opportunity to participate in the care of this patient.  If there should be any questions regarding the radiotherapy, please feel free to contact me at any time.     Dictated By Conor Jimenez M.D.  d: 03/24/2025 15:14:52  t: 03/24/2025 16:08:23  Job [id number]  NAD/    cc: Vishnu Cuenca M.D.   Mirtha Finley DO

## 2025-04-21 ENCOUNTER — OFFICE VISIT (OUTPATIENT)
Dept: RHEUMATOLOGY | Facility: CLINIC | Age: 70
End: 2025-04-21
Payer: MEDICARE

## 2025-04-21 VITALS
HEIGHT: 64 IN | WEIGHT: 103.38 LBS | OXYGEN SATURATION: 94 % | TEMPERATURE: 98 F | SYSTOLIC BLOOD PRESSURE: 110 MMHG | BODY MASS INDEX: 17.65 KG/M2 | RESPIRATION RATE: 16 BRPM | DIASTOLIC BLOOD PRESSURE: 60 MMHG | HEART RATE: 78 BPM

## 2025-04-21 DIAGNOSIS — C34.90 NON-SMALL CELL LUNG CANCER METASTATIC TO MEDIASTINUM (HCC): ICD-10-CM

## 2025-04-21 DIAGNOSIS — D84.821 IMMUNODEFICIENCY DUE TO DRUG THERAPY (HCC): ICD-10-CM

## 2025-04-21 DIAGNOSIS — M06.00 SERONEGATIVE RHEUMATOID ARTHRITIS (HCC): Primary | ICD-10-CM

## 2025-04-21 DIAGNOSIS — G89.29 CHRONIC PAIN OF RIGHT KNEE: ICD-10-CM

## 2025-04-21 DIAGNOSIS — Z79.899 IMMUNODEFICIENCY DUE TO DRUG THERAPY (HCC): ICD-10-CM

## 2025-04-21 DIAGNOSIS — C78.1 NON-SMALL CELL LUNG CANCER METASTATIC TO MEDIASTINUM (HCC): ICD-10-CM

## 2025-04-21 DIAGNOSIS — Z51.81 THERAPEUTIC DRUG MONITORING: ICD-10-CM

## 2025-04-21 DIAGNOSIS — M25.561 CHRONIC PAIN OF RIGHT KNEE: ICD-10-CM

## 2025-04-21 PROCEDURE — G2211 COMPLEX E/M VISIT ADD ON: HCPCS | Performed by: INTERNAL MEDICINE

## 2025-04-21 PROCEDURE — 99214 OFFICE O/P EST MOD 30 MIN: CPT | Performed by: INTERNAL MEDICINE

## 2025-04-21 NOTE — PATIENT INSTRUCTIONS
Recommend obtaining a special \"gout CAT scan\" known as dual Energy CT (DECT) scan to detect gout deposits.     Order is in for the right knee. But the scan can only be done at [facility] in CT scan room #4.     The patient does have a medical indication that may benefit from the use of medical cannabis.  I discussed that there is no medical literature to support the use of medical cannabis in rheumatic disease.  That being said, many patients have used medical cannabis despite the lack of supporting evidence/research and have found benefit anecdotally.  I discussed that THC containing products can be mind altering and cause diminished reflexes which can predispose to driving under the influence (DUI).  Medical cannabis is absolutely NOT safe during pregnancy and products can cross the placenta.  Medical cannabis is NOT safe for patients with developing central nervous systems including patient's that are younger than the early 20s.  Medical cannabis can increase the risk of falls.  Medical cannabis can be a systemic/peripheral vasodilator, however it may be associated with cerebral/coronary vasoconstriction and cardiovascular events such as acute MI may occur.  I recommend using only oral or topical medical cannabis products.  Smoking or inhaling medical cannabis may worsen underlying rheumatic disease or cause pulmonary issues.      Due to the [facility] NO longer accepting paper applications for medical cannabis, please read the information below to complete your application:    Your doctor has approved your need for medical cannabis.  Here are the steps to proceeding with the application.    1st step:  Your doctor and nurse will complete your health assessment process online.   **You will not be able to fill your portion of the application out until after the doctor's office has completed their portion. Please note that the health assessment process could take 48-72 hours to be submitted into the  website.**   2nd step:  After 48-72 hours, you or your caregiver will click on the link below to complete your portion of the application process.    Please note that the health assessment process could take 48-72 hours to be submitted into the website.  If after 72 hours you are still unable to complete the application, you may call the office to inquire @ [phone] or send a [employer] message.      [email or web address]

## 2025-04-21 NOTE — H&P
The following individual(s) verbally consented to be recorded using ambient AI listening technology and understand that they can each withdraw their consent to this listening technology at any point by asking the clinician to turn off or pause the recording:    Patient name: Tatyana ADELSO Christie  Additional names:
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
## 2025-04-23 ENCOUNTER — TELEPHONE (OUTPATIENT)
Facility: CLINIC | Age: 70
End: 2025-04-23

## 2025-04-23 DIAGNOSIS — M17.11 PRIMARY OSTEOARTHRITIS OF RIGHT KNEE: Primary | ICD-10-CM

## 2025-04-23 NOTE — TELEPHONE ENCOUNTER
Patient would like to come in to get right knee gel injection.  Patient was told we will call her once approved.  She wants to get this done in July.

## 2025-05-05 DIAGNOSIS — M06.00 SERONEGATIVE RHEUMATOID ARTHRITIS (HCC): ICD-10-CM

## 2025-05-05 DIAGNOSIS — M06.4 INFLAMMATORY POLYARTHRITIS (HCC): ICD-10-CM

## 2025-05-05 RX ORDER — LEFLUNOMIDE 20 MG/1
20 TABLET ORAL DAILY
Qty: 90 TABLET | Refills: 0 | Status: SHIPPED | OUTPATIENT
Start: 2025-05-05

## 2025-05-05 NOTE — TELEPHONE ENCOUNTER
Leflunomide 20 mg      Last office visit: 4/21/2025    Next Rheum Apt:10/21/2025 Negro Carlson MD    Last fill: 2/3/2025 90 tab, 0 refills    Labs:   Lab Results   Component Value Date    CREATSERUM 0.49 (L) 02/10/2025    ALKPHO 90 02/10/2025    AST 21 02/10/2025    ALT 11 02/10/2025    BILT 0.5 02/10/2025    TP 7.1 02/10/2025    ALB 4.1 02/10/2025       Lab Results   Component Value Date    WBC 5.5 02/10/2025    HGB 11.9 (L) 02/10/2025    .0 02/10/2025    NEPRELIM 3.64 02/10/2025    NEPERCENT 66.5 02/10/2025    LYPERCENT 20.5 02/10/2025    NE 3.64 02/10/2025    LYMABS 1.12 02/10/2025

## 2025-05-06 ENCOUNTER — LAB ENCOUNTER (OUTPATIENT)
Dept: LAB | Age: 70
End: 2025-05-06
Attending: INTERNAL MEDICINE
Payer: MEDICARE

## 2025-05-06 DIAGNOSIS — D84.821 IMMUNODEFICIENCY DUE TO DRUG THERAPY (HCC): ICD-10-CM

## 2025-05-06 DIAGNOSIS — Z79.899 IMMUNODEFICIENCY DUE TO DRUG THERAPY (HCC): ICD-10-CM

## 2025-05-06 DIAGNOSIS — M06.00 SERONEGATIVE RHEUMATOID ARTHRITIS (HCC): ICD-10-CM

## 2025-05-06 LAB
ALBUMIN SERPL-MCNC: 4.2 G/DL (ref 3.2–4.8)
ALBUMIN/GLOB SERPL: 1.6 {RATIO} (ref 1–2)
ALP LIVER SERPL-CCNC: 82 U/L (ref 55–142)
ALT SERPL-CCNC: 9 U/L (ref 10–49)
ANION GAP SERPL CALC-SCNC: 7 MMOL/L (ref 0–18)
AST SERPL-CCNC: 19 U/L (ref ?–34)
BASOPHILS # BLD AUTO: 0.04 X10(3) UL (ref 0–0.2)
BASOPHILS NFR BLD AUTO: 0.8 %
BILIRUB SERPL-MCNC: 0.4 MG/DL (ref 0.2–1.1)
BUN BLD-MCNC: 13 MG/DL (ref 9–23)
CALCIUM BLD-MCNC: 9.2 MG/DL (ref 8.7–10.6)
CHLORIDE SERPL-SCNC: 103 MMOL/L (ref 98–112)
CO2 SERPL-SCNC: 28 MMOL/L (ref 21–32)
CREAT BLD-MCNC: 0.57 MG/DL (ref 0.55–1.02)
CRP SERPL-MCNC: 2.1 MG/DL (ref ?–0.5)
EGFRCR SERPLBLD CKD-EPI 2021: 100 ML/MIN/1.73M2 (ref 60–?)
EOSINOPHIL # BLD AUTO: 0.16 X10(3) UL (ref 0–0.7)
EOSINOPHIL NFR BLD AUTO: 3.2 %
ERYTHROCYTE [DISTWIDTH] IN BLOOD BY AUTOMATED COUNT: 14.7 %
ERYTHROCYTE [SEDIMENTATION RATE] IN BLOOD: 27 MM/HR (ref 0–30)
FASTING STATUS PATIENT QL REPORTED: NO
GLOBULIN PLAS-MCNC: 2.6 G/DL (ref 2–3.5)
GLUCOSE BLD-MCNC: 87 MG/DL (ref 70–99)
HCT VFR BLD AUTO: 36.3 % (ref 35–48)
HGB BLD-MCNC: 11.3 G/DL (ref 12–16)
IMM GRANULOCYTES # BLD AUTO: 0.01 X10(3) UL (ref 0–1)
IMM GRANULOCYTES NFR BLD: 0.2 %
LYMPHOCYTES # BLD AUTO: 1.05 X10(3) UL (ref 1–4)
LYMPHOCYTES NFR BLD AUTO: 21.1 %
MCH RBC QN AUTO: 29.4 PG (ref 26–34)
MCHC RBC AUTO-ENTMCNC: 31.1 G/DL (ref 31–37)
MCV RBC AUTO: 94.5 FL (ref 80–100)
MONOCYTES # BLD AUTO: 0.69 X10(3) UL (ref 0.1–1)
MONOCYTES NFR BLD AUTO: 13.9 %
NEUTROPHILS # BLD AUTO: 3.03 X10 (3) UL (ref 1.5–7.7)
NEUTROPHILS # BLD AUTO: 3.03 X10(3) UL (ref 1.5–7.7)
NEUTROPHILS NFR BLD AUTO: 60.8 %
OSMOLALITY SERPL CALC.SUM OF ELEC: 285 MOSM/KG (ref 275–295)
PLATELET # BLD AUTO: 276 10(3)UL (ref 150–450)
POTASSIUM SERPL-SCNC: 5.2 MMOL/L (ref 3.5–5.1)
PROT SERPL-MCNC: 6.8 G/DL (ref 5.7–8.2)
RBC # BLD AUTO: 3.84 X10(6)UL (ref 3.8–5.3)
SODIUM SERPL-SCNC: 138 MMOL/L (ref 136–145)
WBC # BLD AUTO: 5 X10(3) UL (ref 4–11)

## 2025-05-06 PROCEDURE — 36415 COLL VENOUS BLD VENIPUNCTURE: CPT

## 2025-05-06 PROCEDURE — 85652 RBC SED RATE AUTOMATED: CPT

## 2025-05-06 PROCEDURE — 86140 C-REACTIVE PROTEIN: CPT

## 2025-05-06 PROCEDURE — 85025 COMPLETE CBC W/AUTO DIFF WBC: CPT

## 2025-05-06 PROCEDURE — 80053 COMPREHEN METABOLIC PANEL: CPT

## 2025-06-11 NOTE — PROGRESS NOTES
Hub staff attempted to follow warm transfer process and was unsuccessful     Caller: Aster Craft    Relationship to patient: Self    Best call back number: [phone]    [facility]. SHE IS HAVING PAIN IN HER LOWER ABDOMEN AND IS FEELING NAUSEA AND HAVING A CHILL EVERY NOW AND THEN. SHE WOULD LIKE FOR SOMEONE TO CALL HER REGARDING THIS. THANK YOU            updated previous [facility] Disability Forms to indicate return to work 02/01/2021 as discussed with Patient and Dr. Aldair Banks.  Updated Forms faxed to [facility] at [phone]

## 2025-06-17 ENCOUNTER — HOSPITAL ENCOUNTER (OUTPATIENT)
Dept: CT IMAGING | Age: 70
Discharge: HOME | End: 2025-06-17
Attending: RADIOLOGY
Payer: MEDICARE

## 2025-06-17 DIAGNOSIS — C34.12 MALIGNANT NEOPLASM OF UPPER LOBE OF LEFT LUNG (HCC): ICD-10-CM

## 2025-06-17 DIAGNOSIS — M17.11 PRIMARY OSTEOARTHRITIS OF RIGHT KNEE: Primary | ICD-10-CM

## 2025-06-17 PROCEDURE — 71250 CT THORAX DX C-: CPT | Performed by: RADIOLOGY

## 2025-06-23 ENCOUNTER — APPOINTMENT (OUTPATIENT)
Age: 70
End: 2025-06-23
Attending: INTERNAL MEDICINE
Payer: MEDICARE

## 2025-06-25 ENCOUNTER — APPOINTMENT (OUTPATIENT)
Dept: RADIATION ONCOLOGY | Facility: HOSPITAL | Age: 70
End: 2025-06-25
Attending: RADIOLOGY
Payer: MEDICARE

## 2025-06-25 ENCOUNTER — HOSPITAL ENCOUNTER (OUTPATIENT)
Dept: CT IMAGING | Facility: HOSPITAL | Age: 70
Discharge: HOME | End: 2025-06-25
Attending: INTERNAL MEDICINE
Payer: MEDICARE

## 2025-06-25 DIAGNOSIS — M25.561 CHRONIC PAIN OF RIGHT KNEE: ICD-10-CM

## 2025-06-25 DIAGNOSIS — G89.29 CHRONIC PAIN OF RIGHT KNEE: ICD-10-CM

## 2025-06-25 PROCEDURE — 73700 CT LOWER EXTREMITY W/O DYE: CPT | Performed by: INTERNAL MEDICINE

## 2025-06-26 NOTE — PROGRESS NOTES
Nursing Follow-Up Note    Patient: Tatyana Christie  YOB: 1955  Age: 69 year old  Radiation Oncologist: Dr. Conor Jimenez  Referring Physician: Conor Jimenez  Chief Complaint:   Chief Complaint   Patient presents with    Follow - Up     Date: 6/26/2025    Toxicities: N/A    Vital Signs: /71 (BP Location: Left arm, Patient Position: Sitting, Cuff Size: adult)   Pulse 79   Temp 98.6 °F (37 °C) (Tympanic)   Resp 16   Wt 47.5 kg (104 lb 12.8 oz)   SpO2 97%   BMI 17.99 kg/m² ,   Wt Readings from Last 6 Encounters:   06/27/25 47.5 kg (104 lb 12.8 oz)   04/21/25 46.9 kg (103 lb 6.4 oz)   02/17/25 48.2 kg (106 lb 3.2 oz)   02/10/25 47.2 kg (104 lb)   12/16/24 47.5 kg (104 lb 12.8 oz)   10/21/24 47.5 kg (104 lb 12.8 oz)       Allergies:  Allergies[1]    Nursing Note: Hx of presumed REINALDO lung malignancy. Completed SBRT 3/10/25. Here for follow up today. CT of chest done 6/17/25. Pt feels well today. States her SOB got better after RT. Had some fatigue about a month ago.          [1] No Known Allergies

## 2025-06-27 ENCOUNTER — HOSPITAL ENCOUNTER (OUTPATIENT)
Dept: RADIATION ONCOLOGY | Facility: HOSPITAL | Age: 70
Discharge: HOME | End: 2025-06-27
Attending: RADIOLOGY
Payer: MEDICARE

## 2025-06-27 ENCOUNTER — PATIENT MESSAGE (OUTPATIENT)
Dept: RHEUMATOLOGY | Facility: CLINIC | Age: 70
End: 2025-06-27

## 2025-06-27 VITALS
SYSTOLIC BLOOD PRESSURE: 111 MMHG | HEART RATE: 79 BPM | RESPIRATION RATE: 16 BRPM | WEIGHT: 104.81 LBS | BODY MASS INDEX: 18 KG/M2 | DIASTOLIC BLOOD PRESSURE: 71 MMHG | TEMPERATURE: 99 F | OXYGEN SATURATION: 97 %

## 2025-06-27 DIAGNOSIS — C78.1 NON-SMALL CELL LUNG CANCER METASTATIC TO MEDIASTINUM (HCC): Primary | ICD-10-CM

## 2025-06-27 DIAGNOSIS — C34.90 NON-SMALL CELL LUNG CANCER METASTATIC TO MEDIASTINUM (HCC): Primary | ICD-10-CM

## 2025-06-27 PROCEDURE — 99213 OFFICE O/P EST LOW 20 MIN: CPT

## 2025-06-27 NOTE — PATIENT INSTRUCTIONS
- FOLLOW-UP WITH DR. PRECIOUS MASON ONLY IF NEEDED    - FOLLOW-UP WITH DR KEITA FOR RESULT OF PET    - CALL CENTRAL SCHEDULING AT [phone] TO SCHEDULE PET     - CALL DR PRECIOUS MASON'S NURSING LINE AT [phone] IF YOU HAVE ANY QUESTIONS/CONCERNS REGARDING RADIATION THERAPY

## 2025-06-27 NOTE — PROGRESS NOTES
[facility]    PATIENT'S NAME: ELEAZAR SHAFFER   RADIATION ONCOLOGIST: Conor Jimenez M.D.   PATIENT ACCOUNT #: [id number] LOCATION: [facility]   [facility]   MEDICAL RECORD #: [id number] YOB: 1955   FOLLOW-UP DATE: 06/27/2025       RADIATION ONCOLOGY FOLLOW-UP NOTE    REFERRING PHYSICIAN:  Vishnu Cuenca MD.    DIAGNOSIS:  Probable left upper lobe lung malignancy.    REGION TREATED:  Left upper lobe mass, 5000 cGy, completed 03/19/2025.    INTERVAL SINCE COMPLETION OF RADIATION THERAPY:  3 months.     PATIENT STATUS:  Clinically DOROTHY with questionable radiographic findings.    HISTORY:  The patient is a 69-year-old female who presents today for a routine followup visit.  She has a history of lung cancer dating back to 2020.  At that time, she had abnormalities in the right upper lobe and left lower lobe.  A subsequent PET scan confirmed activity in both of those masses as well as a precarinal lymph node.  A biopsy of the right upper lobe mass was positive for an adenocarcinoma, though the patient did suffer a resultant pneumothorax.  A navigational biopsy and bronchoscopy of the 4R lymph node was also positive.  The left lower lobe mass was biopsied, but it was questionable as to whether or not this truly represented a successful biopsy and was read as negative.  A CT-guided biopsy was recommended but the patient refused based upon the prior pneumothorax.  It was ultimately determined that the patient likely had metastatic disease, and she was then treated with combined immunotherapy by Dr. Cuenca.  She did well for a while but began having some significant side effects.  On followup imaging, there was a mass in the left upper lobe which was growing.  There was no evidence of any other local or regional disease, and the patient was then referred to Radiation Oncology to consider treatment.  A biopsy had been recommended, but the patient was continuing to refuse.  I then treated the left upper  lobe mass to 5000 cGy in 5 fractions, completing on 03/19/2025.    On her visit today, the patient overall is feeling well.  She denies any particular issues or complaints and never had any meaningful side effects as a result of her radiation.  She has had no change in her shortness of breath and has had no cough or hemoptysis.  Her appetite is good.  She does have some issues with fatigue, but this is fairly stable.  She denies other issues or complaints.    Her most recent imaging is a CT scan of the chest done on 06/17/2025.  This shows that the treated area shows some scarring and some enlargement, and it also mentions a new lesion in the superior segment of the left lower lobe.    PHYSICAL EXAMINATION:    VITAL SIGNS:  On exam today, the patient is noted to have a blood pressure of 111/71, pulse of 79, respiratory rate of 16, and a temperature of 98.6.  She has a pain score of 0 and a weight of 104 pounds.  NECK:  Supple with no lymphadenopathy.  LUNGS:  Clear to auscultation bilaterally.  HEART:  Regular rate and rhythm.  Normal S1, S2, and no audible murmurs.  LYMPHATICS:  There is no supraclavicular, axillary, or inguinal lymphadenopathy.  ABDOMEN:  The abdominal exam is benign.    IMPRESSION AND RECOMMENDATIONS:  Overall, the patient is doing well.  She has recovered nicely from any acute side effects of the radiation.  She has no other issues at this point.  With respect to her recent CT scan, I believe that the recent changes in the left upper lobe are most likely related to her recent radiation treatment.  Though this is read as an increase in size, to me this appears likely to simply represent some fibrosis from radiation, which is very commonly misinterpreted as disease progression on short interval scans.    With respect to the superior segment left upper lobe mass, this could theoretically have been in the radiation path as well and may also represent fibrosis from radiation as opposed to an actual  tumor or mass.  Nevertheless, this is questionable enough that I believe it merits a PET scan to try to get a closer evaluation on both of these areas.  I remain optimistic that there is no evidence of true progression, but certainly there is enough suspicion that a PET scan is warranted.  I therefore will order the scan for further evaluation.    I will be out of town for a significant portion of the next month.  I therefore will CC the results to Dr. Cuenca, and I will also try to contact him to discuss the case further.  If there truly is progression only in the left lower lobe, we could theoretically consider additional radiation, but perhaps other systemic treatment may be warranted.  Again, we will determine the course of action after the PET scan is obtained.    Thank you very much for allowing me the opportunity to participate in the care of this patient.  If there should be any questions regarding the radiotherapy, please feel free to contact me at any time.    Dictated By Conor Jimenez M.D.  d: 06/27/2025 13:42:11  t: 06/27/2025 14:05:16  [facility] [id number]  NAD/    cc: Vishnu Cuenca M.D.   Mirtha Finley,

## 2025-07-02 ENCOUNTER — TELEMEDICINE (OUTPATIENT)
Dept: RHEUMATOLOGY | Facility: CLINIC | Age: 70
End: 2025-07-02
Payer: MEDICARE

## 2025-07-02 DIAGNOSIS — M1A.00X1 IDIOPATHIC CHRONIC GOUT WITH TOPHUS, UNSPECIFIED SITE: Primary | ICD-10-CM

## 2025-07-02 PROCEDURE — 99214 OFFICE O/P EST MOD 30 MIN: CPT | Performed by: INTERNAL MEDICINE

## 2025-07-02 PROCEDURE — G2211 COMPLEX E/M VISIT ADD ON: HCPCS | Performed by: INTERNAL MEDICINE

## 2025-07-02 RX ORDER — COLCHICINE 0.6 MG/1
0.6 TABLET ORAL DAILY
Qty: 90 TABLET | Refills: 1 | Status: SHIPPED | OUTPATIENT
Start: 2025-07-02

## 2025-07-02 RX ORDER — ALLOPURINOL 100 MG/1
TABLET ORAL
Qty: 195 TABLET | Refills: 1 | Status: SHIPPED | OUTPATIENT
Start: 2025-07-02 | End: 2025-09-30

## 2025-07-02 NOTE — PATIENT INSTRUCTIONS
Allopurinol uptitration (This will cure gout):  1 tablet (100 mg total) daily for 30 days  THEN 2 tablets (200 mg total) daily for 30 days  THEN 3 tablets (300 mg total) daily. After 30 days of being on this dose, repeat bloodwork. Including a comprehensive metabolic panel (CMP) and uric acid    Colchicine (to prevent gout flares while starting on allopurinol). Generally, you will need to be on colchicine for 6-12 months, when the risk of gout flares is highest during allopurinol initiation.  Colchicine 0.6 mg tab: take 1 tab daily.
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
## 2025-07-03 DIAGNOSIS — R21 RASH: ICD-10-CM

## 2025-07-03 RX ORDER — BETAMETHASONE DIPROPIONATE 0.5 MG/G
1 CREAM TOPICAL 2 TIMES DAILY
Qty: 45 G | Refills: 0 | Status: SHIPPED | OUTPATIENT
Start: 2025-07-03

## 2025-07-03 NOTE — TELEPHONE ENCOUNTER
Betamethasone cream      Last office visit: 7/2/2025    Next Rheum Apt:10/21/2025 Negro Carlson MD    Last fill: 11/8/2023 45 g, 1 refill

## 2025-07-08 ENCOUNTER — HOSPITAL ENCOUNTER (OUTPATIENT)
Dept: NUCLEAR MEDICINE | Facility: HOSPITAL | Age: 70
Discharge: HOME | End: 2025-07-08
Attending: RADIOLOGY
Payer: MEDICARE

## 2025-07-08 DIAGNOSIS — C34.90 NON-SMALL CELL LUNG CANCER METASTATIC TO MEDIASTINUM (HCC): ICD-10-CM

## 2025-07-08 DIAGNOSIS — C78.1 NON-SMALL CELL LUNG CANCER METASTATIC TO MEDIASTINUM (HCC): ICD-10-CM

## 2025-07-08 LAB — GLUCOSE BLD-MCNC: 75 MG/DL (ref 70–99)

## 2025-07-08 PROCEDURE — 78815 PET IMAGE W/CT SKULL-THIGH: CPT | Performed by: RADIOLOGY

## 2025-07-08 PROCEDURE — 82962 GLUCOSE BLOOD TEST: CPT

## 2025-07-11 ENCOUNTER — OFFICE VISIT (OUTPATIENT)
Age: 70
End: 2025-07-11
Attending: INTERNAL MEDICINE
Payer: MEDICARE

## 2025-07-11 VITALS
WEIGHT: 105.19 LBS | HEART RATE: 69 BPM | OXYGEN SATURATION: 94 % | HEIGHT: 64.02 IN | SYSTOLIC BLOOD PRESSURE: 115 MMHG | RESPIRATION RATE: 16 BRPM | TEMPERATURE: 98 F | DIASTOLIC BLOOD PRESSURE: 69 MMHG | BODY MASS INDEX: 17.96 KG/M2

## 2025-07-11 DIAGNOSIS — Z08 ENCOUNTER FOR FOLLOW-UP EXAMINATION AFTER COMPLETED TREATMENT FOR MALIGNANT NEOPLASM: ICD-10-CM

## 2025-07-11 DIAGNOSIS — C34.90 PRIMARY MALIGNANT NEOPLASM OF LUNG METASTATIC TO OTHER SITE, UNSPECIFIED LATERALITY (HCC): ICD-10-CM

## 2025-07-11 DIAGNOSIS — R91.1 PULMONARY NODULE 1 CM OR GREATER IN DIAMETER: Primary | ICD-10-CM

## 2025-07-11 NOTE — PROGRESS NOTES
Cancer Center Progress Note  Patient Name: Tatyana Christie   YOB: 1955   Medical Record Number: [id number]   CSN: [id number]   Attending Physician: Vishnu Cuenca M.D.       Date of Visit: 7/11/2025         Chief Complaint:  No chief complaint on file.       Oncologic History:  Tatyana Christie is a 69 year old female  referred by Dr. Kaur for evaluation and treatment of her lung cancer.  The patient reports that she was in her usual state of health until July, 2020, when she contracted COVID19. She reports that she recovered, but has had intermittent dizziness and palpitations since then.  The workup included a CXR that revealed abnormalities of the RUL and LLL.  This prompted CT that revealed a RUL mass and a LLL mass.  Subsequent PET scan confirmed PET avid masses in the RUL and LLL, as well as a positive pre-carinal LN. She underwent a CT guided biopsy of the RUL mass that revealed adenocarcinoma consistent with a lung primary.  EGFR and ALK were negative, ROS-1 was equivocal, and PDL-1 was 11-20%.  She suffered a pneumothorax as a result of the biopsy and was hospitalized with a pigtail catheter placement.   She repors that an echo was normal, but she continues to complain of intermittent palpitations and dizziness. She has been referred to cardiology for evaluation.   She has no CP or SOB. No palpable LAD or masses.  No F/C/NS.  She has a significant smoking history.        She underwent MRI brain that was negative for metastases. Repeat CT of the chest revealed progression of the RUL mass, LLL mass, and adenopathy, but no new lesions.    She underwent navigational bronchoscopy with EBUS guided biopsy of a 4R node and the LLL mass. She tolerated the procedure well.  The jeramie biopsy was positive for NSCLCa. The LLL mass was read as normal lung tissue.   Her case was reviewed at the [facility] Thoracic tumor board, where CT guided biopsy of the LLL mass was recommended.   The patient  steadfastly refused to consider any further biopsy or workup.  After discussing treatment options for presumed stage IV lung cancer, we settled on combined immunotherapy.  She developed severe arthritis, chronic fever, and rash that prompted holding the immunotherapy.     Symptoms were somewhat relieved with steroids.  She was seen by rheumatology and started on Leflunamide.     History of Present Illness:  Pt is here for follow up. No new symptoms    Performance Status:  ECOG 1    Past Medical History:  Past Medical History:    Anesthesia complication    stated they had a hard time waking pt up after ankle surgery     Arrhythmia    Arthritis    Back problem    Cancer (HCC)    Cataract    Esophageal reflux    Hyperlipidemia    Lung cancer (HCC)    Adenocarcinoma    Osteoarthritis    Pneumothorax, right    Shortness of breath    Visual impairment    contacts and glasses       Past Surgical History:  Past Surgical History:   Procedure Laterality Date    Cataract Right 2023    Cataract Left 2023    Fracture surgery Right     right ankle fracture surgery with pins and plates     Other surgical history      laser surgery on retina       Family History:  Family History   Problem Relation Age of Onset    Heart Disorder Mother     Dementia Mother     Stroke Mother     Diabetes Father        Social History:  Social History     Socioeconomic History    Marital status:      Spouse name: Not on file    Number of children: 1    Years of education: Not on file    Highest education level: Not on file   Occupational History    Occupation: CNA     Comment: Retired   Tobacco Use    Smoking status: Former     Current packs/day: 0.00     Average packs/day: 1.6 packs/day for 50.7 years (80.0 ttl pk-yrs)     Types: Cigarettes     Start date: 1968     Quit date: 2010     Years since quittin.7    Smokeless tobacco: Never   Vaping Use    Vaping status: Never Used   Substance and Sexual Activity    Alcohol use:  Not Currently    Drug use: Never    Sexual activity: Not on file   Other Topics Concern    Caffeine Concern Yes    Exercise No    Seat Belt Yes    Special Diet Yes    Stress Concern No    Weight Concern Yes   Social History Narrative    - lives with     Retired CNA    1 grown Son     Social Drivers of Health     Food Insecurity: Not on file   Transportation Needs: Not on file   Housing Stability: Not on file       Current Medications:    Current Outpatient Medications:     BETAMETHASONE DIPROPIONATE 0.05 % External Cream, Apply 1 Application topically 2 (two) times daily., Disp: 45 g, Rfl: 0    allopurinol 100 MG Oral Tab, Take 1 tablet (100 mg total) by mouth daily for 30 days, THEN 2 tablets (200 mg total) daily for 30 days, THEN 3 tablets (300 mg total) daily., Disp: 195 tablet, Rfl: 1    colchicine 0.6 MG Oral Tab, Take 1 tablet (0.6 mg total) by mouth daily., Disp: 90 tablet, Rfl: 1    LEFLUNOMIDE 20 MG Oral Tab, Take 1 tablet (20 mg total) by mouth daily., Disp: 90 tablet, Rfl: 0    Resveratrol 100 MG Oral Cap, Take 200 mg by mouth daily. (Patient not taking: Reported on 7/2/2025), Disp: , Rfl:     Acetaminophen (TYLENOL ARTHRITIS PAIN OR), Take by mouth., Disp: , Rfl:     Nettle, Urtica Dioica, (NETTLE LEAF OR), Take by mouth., Disp: , Rfl:     Brit, Zingiber officinalis, (BRIT OR), Take by mouth. Oral brit supplement 1x daily, Disp: , Rfl:     Turmeric (QC TUMERIC COMPLEX OR), Take by mouth., Disp: , Rfl:     Multiple Vitamins-Minerals (MULTI-VITAMIN/MINERALS) Oral Tab, Take 1 tablet by mouth in the morning., Disp: , Rfl:     Allergies:  No Known Allergies     Review of Systems:    Constitutional No chills, night sweats, or weight loss.   Eyes No significant visual difficulties. No diplopia. No yellowing.   Hematologic/Lymphatic Normal - No easy bruising or bleeding.  No tender or palpable lymph nodes.   Respiratory No cough or hemoptysis.   Cardiovascular No anginal chest pain,  palpitations or orthopnea.   Gastrointestinal No nausea, vomiting, diarrhea, GI bleeding. NL appetite.   Genitorurinary  No hematuria, dysuria, abnormal bleeding, or incontinence.   Integumentary No yellowing of the skin   Neurologic No headache, blurred vision, and no areas of focal weakness. Normal gait.   Psychiatric No insomnia, depression, olayinka or mood swings.       Vital Signs:  /69 (BP Location: Left arm, Patient Position: Sitting, Cuff Size: adult)   Pulse 69   Temp 97.5 °F (36.4 °C) (Temporal)   Resp 16   Ht 1.626 m (5' 4.02\")   Wt 47.7 kg (105 lb 3.2 oz)   SpO2 94%   BMI 18.05 kg/m²       Physical Examination:    Constitutional Normal - Mood and affect appropriate. Appears close to chronological age. Well nourished. Well developed.   Eyes Normal - Conjunctivae and sclerae are clear and without icterus. Pupils are reactive and equal.   Hematologic/Lymphatic Normal - No petechiae or purpura.  No tender or palpable lymph nodes in the cervical, supraclavicular, axillary or inguinal area.   Respiratory Normal - Lungs are clear to auscultation, no wheezing.   Cardiovascular Normal - Regular rate and rhythm, no murmurs.   Abdomen Normal - Non-tender, non-distended, no masses, ascites or hepatosplenomegaly.    Extremities No edema.    Integumentary Erythematous rash of the abdomen w/o blisters or excoriation. Improved.    Neurologic Normal - No sensory or motor deficits, normal cerebellar function, cranial nerves intact.   Psychiatric Normal - A&Ox3. Coherent speech. Verbalizes understanding of our discussions today.           Laboratory:  No Results in last week  No Results in last week              Radiology:  CT Chest 6/17/25: CONCLUSION:  New mass in the superior segment left lower lobe.  Previous mass in the left upper lobe redemonstrated, now shows extension to pleural surfaces of the left lateral pleura comma and the interlobar fissure pleura.  The mass measures larger now    in transverse  dimension, relatively similar in AP dimension.       PET 7/8/25: CONCLUSION: The abnormality seen on the diagnostic CT chest exam performed recently are redemonstrated on the localizer CT images for this PET exam, and demonstrate elevated activity above background SUV maximum 2.4, for each of these areas which are   very close to each other and are probably related to the same process. No other areas of abnormal elevated activity.                Pathology:  Final Diagnosis:   Needle core biopsy of right upper lobe lung mass:  -POSITIVE for adenocarcinoma; consistent with lung primary.           Final Diagnosis:   A.  EBUS guided fine-needle aspiration of 4R lymph node:  -Adequate for evaluation.  -POSITIVE for metastatic adenocarcinoma; consistent with known lung primary.  -(See comment).    B.  EBUS guided fine-needle aspiration of left lower lobe lung mass:  -Adequate for evaluation.  -No cytologic evidence of malignancy.  -Specimen composed predominantly of benign bronchial epithelial cells.    C.  Cytospin smears and cell block from bronchoalveolar lavage, left lower lobe of lung:  -Adequate for evaluation.  -No cytologic evidence of malignancy.  -Specimen composed of benign alveolar macrophages, respiratory epithelial cells and mixed inflammatory cells.     Final Diagnosis:   Endobronchial biopsy, left lower lobe lung:   -Fragments of benign lung tissue with no significant pathologic abnormalities.   -No evidence of malignancy.          Impression and Plan:      Non-small cell lung cancer: The patient's CT demonstrates progression of the RUL and LLL lesions, concerning for both being malignant.  Her case was reviewed at the multidisciplinary thoracic tumor board, where the possibility of two curable primaries was raised. Recommendation was made for navigational bronchoscopy to biopsy the mediastinal nodes and LLL mass. The 4R LN is positive for adenocarcinoma, c/w at least stage IIIA disease. The normal  pathology of the LLL is discordant with the CT and PET findings of an enlarging PET positive mass.  We discussed that repeat biopsy was recommended.  She steadfastly refuses additional biopsies.  In the absence of the biopsy, definitively demonstrating benign etiology of the LLL, I recommend that we proceed as if this is Stage IV disease, as it appears on PET. We discussed treatment options, including palliative chemo, chemo-immunotherapy with ipi/nivo and 2 cycles of chemotherapy, or just immunotherapy with Ipi/Nivo. With PDL-1 only 11-20%, I don not recommend Keyrtuda alone.  She does not want to add the risk of chemo side effects to the risk of immunotherapy and has elected to proceed with Combined immunotherapy.  We reviewed the risks, benefits, and side effects, and she agreed to proceed. She complained of fatigue and fever that were intolerable, prompting holding therapy. Her CT, at that time, demonstrated response to therapy. She also developed a rash on her abdomen. We discussed a course of prednisone to calm the side effects, but she initially declined, in favor of CBD gummies. She eventually agreed to 50mg daily x5 days, and when she improved, a prolonged wean. She is better, but symptoms wax and wane, She is following with rheumatology.   She completed SBRT to the REINALDO mass on 3/19/25. Her current PET is more consistent with Poet-RT inflammation than progression. Will monitor with a repeat PET in 3 months.       Shoulder arthritis and arthralgia: Much improved. Continue to follow with rheumatology. Continue leflunomide per rheumatlogy.        Planned Follow Up:  3 months    Electronically Signed by:    Vishnu Cuenca M.D.  [facility]

## 2025-07-11 NOTE — PROGRESS NOTES
Pt here for follow up.  No new complaints.   Recent PET.    Outpatient Oncology Care Plan  Problem list:  knowledge deficit    Problems related to:    disease/disease progression    Interventions:  provided general teaching    Expected outcomes:  understands plan of care    Progress towards outcome:  making progress    Education Record    Learner:  Patient and Spouse  Barriers / Limitations:  None  Method:  Brief focused  Outcome:  Shows understanding  Comments:

## 2025-07-21 ENCOUNTER — OFFICE VISIT (OUTPATIENT)
Facility: CLINIC | Age: 70
End: 2025-07-21
Payer: MEDICARE

## 2025-07-21 VITALS — WEIGHT: 105.19 LBS | BODY MASS INDEX: 17.96 KG/M2 | HEIGHT: 64.02 IN

## 2025-07-21 DIAGNOSIS — M25.461 EFFUSION OF RIGHT KNEE: Primary | ICD-10-CM

## 2025-07-21 DIAGNOSIS — M17.11 PRIMARY OSTEOARTHRITIS OF RIGHT KNEE: ICD-10-CM

## 2025-07-21 LAB
BASOPHILS NFR SNV: 0 %
COLOR FLD: YELLOW
CRYSTALS SNV QL MICRO: NEGATIVE
EOSINOPHIL NFR SNV: 0 %
GRANULOCYTES # SNV AUTO: ABNORMAL /MM3 (ref 0–200)
LYMPHOCYTES NFR SNV: 5 %
MONOS+MACROS NFR SNV: 7 %
NEUTROPHILS NFR SNV: 88 %
RBC # FLD AUTO: <3000 /MM3 (ref ?–1)
TOTAL CELLS COUNTED FLD: 100

## 2025-07-21 PROCEDURE — 89050 BODY FLUID CELL COUNT: CPT | Performed by: PHYSICIAN ASSISTANT

## 2025-07-21 PROCEDURE — 89060 EXAM SYNOVIAL FLUID CRYSTALS: CPT | Performed by: PHYSICIAN ASSISTANT

## 2025-07-21 NOTE — PROCEDURES
After informed consent, the patient's right knee was marked and prepped with antiseptic solution.  Local anesthetic was used to create a skin wheal near the superolateral aspiration site.  It was reprepped with antiseptic solution, and an 18-gauge needle was inserted through the superolateral portal site, into the knee joint deep to the patella.  Aspiration was performed and returned 75cc of cloudy, blood-tinged synovial fluid.  35cc was sent for crystal analysis and cell count while the remainder was discarded. Following this, the patient's right knee was was injected with 6mL of 48mg/6mL Synvisc One through the superolateral portal.  A band-aid was applied.  The patient tolerated the procedure well.   A band-aid was applied.  The patient tolerated the procedure well.    Josep Palacios PA-C  [facility]

## 2025-07-28 DIAGNOSIS — M06.00 SERONEGATIVE RHEUMATOID ARTHRITIS (HCC): Primary | ICD-10-CM

## 2025-07-28 DIAGNOSIS — M06.4 INFLAMMATORY POLYARTHRITIS (HCC): ICD-10-CM

## 2025-07-28 RX ORDER — LEFLUNOMIDE 20 MG/1
20 TABLET ORAL DAILY
Qty: 90 TABLET | Refills: 0 | Status: SHIPPED | OUTPATIENT
Start: 2025-07-28

## 2025-07-28 NOTE — TELEPHONE ENCOUNTER
Last office visit: 7/2/2025    Next Rheum Apt:10/21/2025 Negro Carlson MD    Last fill: leflunomide 20 mg 5/5/2025  90 tablets, 0 refills     Labs:   Lab Results   Component Value Date    CREATSERUM 0.57 05/06/2025    ALKPHO 82 05/06/2025    AST 19 05/06/2025    ALT 9 (L) 05/06/2025    BILT 0.4 05/06/2025    TP 6.8 05/06/2025    ALB 4.2 05/06/2025       Lab Results   Component Value Date    WBC 5.0 05/06/2025    HGB 11.3 (L) 05/06/2025    .0 05/06/2025    NEPRELIM 3.03 05/06/2025    NEPERCENT 60.8 05/06/2025    LYPERCENT 21.1 05/06/2025    NE 3.03 05/06/2025    LYMABS 1.05 05/06/2025

## 2025-08-22 ENCOUNTER — LAB ENCOUNTER (OUTPATIENT)
Dept: LAB | Age: 70
End: 2025-08-22
Attending: INTERNAL MEDICINE

## 2025-08-22 DIAGNOSIS — M25.561 CHRONIC PAIN OF RIGHT KNEE: ICD-10-CM

## 2025-08-22 DIAGNOSIS — Z51.81 THERAPEUTIC DRUG MONITORING: ICD-10-CM

## 2025-08-22 DIAGNOSIS — G89.29 CHRONIC PAIN OF RIGHT KNEE: ICD-10-CM

## 2025-08-22 DIAGNOSIS — M1A.00X1 IDIOPATHIC CHRONIC GOUT WITH TOPHUS, UNSPECIFIED SITE: ICD-10-CM

## 2025-08-22 DIAGNOSIS — M06.00 SERONEGATIVE RHEUMATOID ARTHRITIS (HCC): ICD-10-CM

## 2025-08-22 LAB
ALBUMIN SERPL-MCNC: 4.3 G/DL (ref 3.2–4.8)
ALBUMIN/GLOB SERPL: 1.6 (ref 1–2)
ALP LIVER SERPL-CCNC: 97 U/L (ref 55–142)
ALT SERPL-CCNC: 13 U/L (ref 10–49)
ANION GAP SERPL CALC-SCNC: 9 MMOL/L (ref 0–18)
AST SERPL-CCNC: 20 U/L (ref ?–34)
BASOPHILS # BLD AUTO: 0.07 X10(3) UL (ref 0–0.2)
BASOPHILS NFR BLD AUTO: 1.2 %
BILIRUB SERPL-MCNC: 0.6 MG/DL (ref 0.2–1.1)
BUN BLD-MCNC: 9 MG/DL (ref 9–23)
CALCIUM BLD-MCNC: 9.8 MG/DL (ref 8.7–10.6)
CHLORIDE SERPL-SCNC: 102 MMOL/L (ref 98–112)
CO2 SERPL-SCNC: 27 MMOL/L (ref 21–32)
CREAT BLD-MCNC: 0.63 MG/DL (ref 0.55–1.02)
CRP SERPL-MCNC: 3.9 MG/DL (ref ?–0.5)
EGFRCR SERPLBLD CKD-EPI 2021: 97 ML/MIN/1.73M2 (ref 60–?)
EOSINOPHIL # BLD AUTO: 0.25 X10(3) UL (ref 0–0.7)
EOSINOPHIL NFR BLD AUTO: 4.1 %
ERYTHROCYTE [DISTWIDTH] IN BLOOD BY AUTOMATED COUNT: 14.6 %
ERYTHROCYTE [SEDIMENTATION RATE] IN BLOOD: 35 MM/HR (ref 0–30)
FASTING STATUS PATIENT QL REPORTED: NO
GLOBULIN PLAS-MCNC: 2.7 G/DL (ref 2–3.5)
GLUCOSE BLD-MCNC: 107 MG/DL (ref 70–99)
HCT VFR BLD AUTO: 36.6 % (ref 35–48)
HGB BLD-MCNC: 11.5 G/DL (ref 12–16)
IMM GRANULOCYTES # BLD AUTO: 0.01 X10(3) UL (ref 0–1)
IMM GRANULOCYTES NFR BLD: 0.2 %
LYMPHOCYTES # BLD AUTO: 1.14 X10(3) UL (ref 1–4)
LYMPHOCYTES NFR BLD AUTO: 18.8 %
MCH RBC QN AUTO: 29.6 PG (ref 26–34)
MCHC RBC AUTO-ENTMCNC: 31.4 G/DL (ref 31–37)
MCV RBC AUTO: 94.1 FL (ref 80–100)
MONOCYTES # BLD AUTO: 0.63 X10(3) UL (ref 0.1–1)
MONOCYTES NFR BLD AUTO: 10.4 %
NEUTROPHILS # BLD AUTO: 3.97 X10 (3) UL (ref 1.5–7.7)
NEUTROPHILS # BLD AUTO: 3.97 X10(3) UL (ref 1.5–7.7)
NEUTROPHILS NFR BLD AUTO: 65.3 %
OSMOLALITY SERPL CALC.SUM OF ELEC: 285 MOSM/KG (ref 275–295)
PLATELET # BLD AUTO: 327 10(3)UL (ref 150–450)
POTASSIUM SERPL-SCNC: 5.4 MMOL/L (ref 3.5–5.1)
PROT SERPL-MCNC: 7 G/DL (ref 5.7–8.2)
RBC # BLD AUTO: 3.89 X10(6)UL (ref 3.8–5.3)
SODIUM SERPL-SCNC: 138 MMOL/L (ref 136–145)
URATE SERPL-MCNC: 2.5 MG/DL (ref 3.1–7.8)
WBC # BLD AUTO: 6.1 X10(3) UL (ref 4–11)

## 2025-08-22 PROCEDURE — 85652 RBC SED RATE AUTOMATED: CPT

## 2025-08-22 PROCEDURE — 85025 COMPLETE CBC W/AUTO DIFF WBC: CPT

## 2025-08-22 PROCEDURE — 84550 ASSAY OF BLOOD/URIC ACID: CPT

## 2025-08-22 PROCEDURE — 80053 COMPREHEN METABOLIC PANEL: CPT

## 2025-08-22 PROCEDURE — 86140 C-REACTIVE PROTEIN: CPT

## 2025-08-22 PROCEDURE — 36415 COLL VENOUS BLD VENIPUNCTURE: CPT

## 2025-08-28 ENCOUNTER — TELEPHONE (OUTPATIENT)
Dept: ORTHOPEDICS CLINIC | Facility: CLINIC | Age: 70
End: 2025-08-28

## 2025-08-28 DIAGNOSIS — M25.561 RIGHT KNEE PAIN, UNSPECIFIED CHRONICITY: Primary | ICD-10-CM

## 2025-08-29 ENCOUNTER — OFFICE VISIT (OUTPATIENT)
Dept: ORTHOPEDICS CLINIC | Facility: CLINIC | Age: 70
End: 2025-08-29

## 2025-08-29 ENCOUNTER — HOSPITAL ENCOUNTER (OUTPATIENT)
Dept: GENERAL RADIOLOGY | Age: 70
Discharge: HOME | End: 2025-08-29
Attending: PHYSICIAN ASSISTANT

## 2025-08-29 VITALS — WEIGHT: 105 LBS | BODY MASS INDEX: 17.93 KG/M2 | HEIGHT: 64 IN

## 2025-08-29 DIAGNOSIS — M25.561 RIGHT KNEE PAIN, UNSPECIFIED CHRONICITY: ICD-10-CM

## 2025-08-29 DIAGNOSIS — M17.11 PRIMARY OSTEOARTHRITIS OF RIGHT KNEE: Primary | ICD-10-CM

## 2025-08-29 LAB
BASOPHILS NFR SNV: 0 %
COLOR FLD: YELLOW
CRYSTALS SNV QL MICRO: NEGATIVE
EOSINOPHIL NFR SNV: 0 %
GRANULOCYTES # SNV AUTO: ABNORMAL /MM3 (ref 0–200)
LYMPHOCYTES NFR SNV: 6 %
MONOS+MACROS NFR SNV: 10 %
NEUTROPHILS NFR SNV: 84 %
RBC # FLD AUTO: <3000 /MM3 (ref ?–1)
TOTAL CELLS COUNTED FLD: 100

## 2025-08-29 PROCEDURE — 89050 BODY FLUID CELL COUNT: CPT | Performed by: PHYSICIAN ASSISTANT

## 2025-08-29 PROCEDURE — 99213 OFFICE O/P EST LOW 20 MIN: CPT | Performed by: PHYSICIAN ASSISTANT

## 2025-08-29 PROCEDURE — 73564 X-RAY EXAM KNEE 4 OR MORE: CPT | Performed by: PHYSICIAN ASSISTANT

## 2025-08-29 PROCEDURE — 89060 EXAM SYNOVIAL FLUID CRYSTALS: CPT | Performed by: PHYSICIAN ASSISTANT

## 2025-08-29 PROCEDURE — 20610 DRAIN/INJ JOINT/BURSA W/O US: CPT | Performed by: PHYSICIAN ASSISTANT

## 2025-08-29 RX ORDER — TRIAMCINOLONE ACETONIDE 40 MG/ML
40 INJECTION, SUSPENSION INTRA-ARTICULAR; INTRAMUSCULAR ONCE
Status: COMPLETED | OUTPATIENT
Start: 2025-08-29 | End: 2025-08-29

## 2025-08-29 RX ADMIN — TRIAMCINOLONE ACETONIDE 40 MG: 40 INJECTION, SUSPENSION INTRA-ARTICULAR; INTRAMUSCULAR at 09:15:00

## (undated) DIAGNOSIS — C78.1 NON-SMALL CELL LUNG CANCER METASTATIC TO MEDIASTINUM (HCC): ICD-10-CM

## (undated) DIAGNOSIS — M35.9 UNDIFFERENTIATED CONNECTIVE TISSUE DISEASE (HCC): ICD-10-CM

## (undated) DIAGNOSIS — Z51.81 THERAPEUTIC DRUG MONITORING: ICD-10-CM

## (undated) DIAGNOSIS — C34.90 NON-SMALL CELL LUNG CANCER METASTATIC TO MEDIASTINUM (HCC): ICD-10-CM

## (undated) DIAGNOSIS — M06.4 INFLAMMATORY POLYARTHRITIS (HCC): Primary | ICD-10-CM

## (undated) DIAGNOSIS — C34.90 NON-SMALL CELL LUNG CANCER METASTATIC TO MEDIASTINUM (HCC): Primary | ICD-10-CM

## (undated) DIAGNOSIS — C78.1 NON-SMALL CELL LUNG CANCER METASTATIC TO MEDIASTINUM (HCC): Primary | ICD-10-CM

## (undated) DEVICE — SINGLE USE SUCTION VALVE MAJ-209: Brand: SINGLE USE SUCTION VALVE (STERILE)

## (undated) DEVICE — GLOVE SURG SENSICARE SZ 7

## (undated) DEVICE — DRAIN CHEST DRY ADULT/PED

## (undated) DEVICE — KENDALL SCD EXPRESS SLEEVES, KNEE LENGTH, MEDIUM: Brand: KENDALL SCD

## (undated) DEVICE — WAYNE PNEUMOTHORAX TRAY: Brand: WAYNE

## (undated) DEVICE — FLUIDGARD® 160 ANTI-FOG SURGICAL MASK WITH ANTI-GLARE SHIELD: Brand: PRECEPT ®

## (undated) DEVICE — Device: Brand: ALWAYS-ON TIP TRACKED FORCEPS, 1.8MM OD, SERRATED CUP

## (undated) DEVICE — MEDI-VAC NON-CONDUCTIVE SUCTION TUBING: Brand: CARDINAL HEALTH

## (undated) DEVICE — FILTERLINE NASAL ADULT O2/CO2

## (undated) DEVICE — MEDI-VAC SUCTION HANDLE REGULAR CAPACITY: Brand: CARDINAL HEALTH

## (undated) DEVICE — TRAP SPECIMEN MUCOUS 70 CUBIC CENTIMETER POLYURETHANE STERILE NOT MADE WITH NATURAL RUBBER LATEX MEDICHOICE: Brand: MEDICHOICE

## (undated) DEVICE — BOWLS UTILITY 16OZ

## (undated) DEVICE — SYRINGE 10ML SLIP TIP

## (undated) DEVICE — LENS FRAMES DISP EYEWEAR

## (undated) DEVICE — PATCH SENSOR PATIENT

## (undated) DEVICE — ENDOSCOPY PACK UPPER: Brand: MEDLINE INDUSTRIES, INC.

## (undated) DEVICE — Device: Brand: ALWAYS-ON TIP TRACKED 22GA SPIN FLEX NEEDLE, 12MM L, 1.8MM OD

## (undated) DEVICE — NEEDLE ASP VIZISHOT 22GA 1.8MM

## (undated) DEVICE — MASK ISOLATION

## (undated) DEVICE — 1200CC GUARDIAN II: Brand: GUARDIAN

## (undated) DEVICE — 3M™ RED DOT™ MONITORING ELECTRODE WITH FOAM TAPE AND STICKY GEL, 50/BAG, 20/CASE, 72/PLT 2570: Brand: RED DOT™

## (undated) DEVICE — 60 ML SYRINGE REGULAR TIP: Brand: MONOJECT

## (undated) DEVICE — LENS PLASTIC DISP EYEWEAR

## (undated) DEVICE — SINGLE USE BIOPSY VALVE MAJ-210: Brand: SINGLE USE BIOPSY VALVE (STERILE)

## (undated) NOTE — Clinical Note
Referring:  Kyung Nissen, MD  [address] [facility],  [address] [place] [address]      Dear Dr. Marycruz Bagley:    Patient is doing much better after prednisone 50 mg daily for the last week. No active synovitis today.  Toes st

## (undated) NOTE — MR AVS SNAPSHOT
[address] [place] [address]  [phone]                    After Visit Summary   4/26/2021    Alejandra Poster    MRN: [id number]           Visit Information     Date & Time  4/26/2021  8:00 AM 9/7/2021 9:40 AM MD [address], [place] [phone]    For the safety of our patients, visitors and care team, we are asking patients not to bring anyone with them to their appointment, unless clinically required. Final    Alkaline Phosphatase 71      50 - 130 U/L Final    Bilirubin, Total 0.3      0.1 - 2.0 mg/dL Final    Total Protein 7.1      6.4 - 8.2 g/dL Final    Albumin 3.6      3.4 - 5.0 g/dL Final    Globulin  3.5      2.8 - 4.4 g/dL Final    A/G Ratio 1.0 in [address] by going to Visits < Visit Summaries. MyChart questions? Call [phone] for help. amiando is NOT to be used for urgent needs. For medical emergencies, dial 911.

## (undated) NOTE — Clinical Note
Patient declined methotrexate. Ok with leflunomide, which is essentially equivalent to methotrexate in RA, though this is less tested in mediated adverse event inflammatory arthritis. If ok, I will order leflunomide.   Phenotype maybe be closer to polymyalg

## (undated) NOTE — Clinical Note
Can you start PA for synvisc one for R GH OA?  Patient completed PT, declines corticosteroid injection

## (undated) NOTE — LETTER
Printed: 2020    Patient Name: Jada Jarquin  : 1955   Medical Record #: [id number]    Consent to Cancer Treatment    I, Jada Jarquin, understand that I have been diagnosed with lung cancer.     I understand that the treatment sug I have had the chance to ask questions about this treatment, and my questions have been answered to my satisfaction. I understand that I can contact my oncologist or my Cancer Care Team at any time if I have questions, by calling [phone].    Tommy

## (undated) NOTE — MR AVS SNAPSHOT
After Visit Summary   6/17/2024    Tatyana Christie   MRN: [id number]           Visit Information     Date & Time  6/17/2024 10:30 AM Provider  [facility]  [phone]      Your Vitals Were  Most recent update: 6/17/2024 10:24 AM    BP   122/78          Pulse   75          Temp   98.1 °F (36.7 °C)          Resp   16          Ht   64\"             Wt   104 lb    SpO2   96%    BMI   17.85 kg/m²         Allergies as of 6/17/2024  Review status set to Review Complete on 6/17/2024   No Known Allergies     Your Current Medications        Dosage    predniSONE 20 MG Oral Tab Take 1 tablet (20 mg total) by mouth daily.    alendronate 70 MG Oral Tab Take 1 tablet (70 mg total) by mouth every 7 days.    leflunomide 20 MG Oral Tab Take 1 tablet (20 mg total) by mouth daily.    betamethasone dipropionate 0.05 % External Cream Apply 1 Application topically 2 (two) times daily.    Acetaminophen (TYLENOL ARTHRITIS PAIN OR) Take by mouth.    Nettle, Urtica Dioica, (NETTLE LEAF OR) Take by mouth.    Sam, Zingiber officinalis, (SAM OR) Take by mouth. Oral sam supplement 1x daily    Turmeric (QC TUMERIC COMPLEX OR) Take by mouth.    Multiple Vitamins-Minerals (MULTI-VITAMIN/MINERALS) Oral Tab Take 1 tablet by mouth daily.      Diagnoses for This Visit    Effusion of right knee   [985765]  -  Primary  Inflammatory polyarthritis   [657367]    Immunodeficiency due to drug therapy   [0058407]    Therapeutic drug monitoring   [719891]    Age related osteoporosis, unspecified pathological fracture presence   [1160944]    Rheumatoid arthritis involving multiple sites with positive rheumatoid factor   [9765841]             Follow-up    Return in about 3 months (around 10/1/2024).     We Ordered the Following     Normal Orders This Visit    Body Fluid Culture(aerobic & anaerobic) [1464216 CUSTOM]     Cell Count and Diff, Synovial [[id number]  CUSTOM]     Cell Count,Diff, and Crystals Synovial [HQU5886 CPT(R)]     Cell Count/Diff & Crystal Synovial fluid [[id number] CUSTOM]     Cell Count/Diff & Crystals, Synovial [[id number] CUSTOM]     CRYSTAL EXAM,MISCELLANEOUS FL [7318841 CPT(R)]     Future Labs/Procedures Expected by Expires    Body Fluid Culture(aerobic & anaerobic) [[id number] CUSTOM]  6/17/2024 6/18/2024    Cell Count/Diff & Crystals, Synovial [[id number] CUSTOM]  6/17/2024 6/17/2025      Future Appointments        Provider Department    7/2/2024 11:00 AM Mirtha Finley [facility]    8/9/2024  9:00 AM  CT [address]    8/12/2024 10:[facility]    10/21/2024 10:15 AM Negro Carlson [facility]      Follow-up Instructions    Return in about 3 months (around 10/1/2024).        Instructions        The fosamax should be taken on an empty stomach with an 8 oz glass of water. Do not take it with other beverages. You must remain upright (sitting or standing--no lying down) for 30 minutes  after taking the medication. Do not take any additional medications, beverages or food for 45 to 60 minutes after taking the medication.    Esophageal irritation (oral bisphosphonates), headaches, abdominal pain, nausea, vomiting, can occur Other rare side effects such as atypical femoral fractures (if taken for many years, the risk is very low and osteonecrosis of the jaw (which is rare and usually happens after tooth extraction or an infection of the jaw).    The last two side effects are unlikely as atypical femoral fractures are only after several years of the medication (and are still rare) and your teeth have been great.                        Did you know that [facility] primary care physicians now offer Video Visits through [employer] for adult patients for a variety of conditions such as allergies, back pain  and cold symptoms? Skip the drive and waiting room and online chat with a doctor face-to-face using your web-cam enabled computer or mobile device wherever you are. Video Visits cost $50 and can be paid hassle-free using a credit, debit, or health savings card.  Not active on [employer]? Ask us how to get signed up today!          If you receive a survey from [employer], please take a few minutes to complete it and provide feedback. We strive to deliver the best patient experience and are looking for ways to make improvements. Your feedback will help us do so. For more information on [employer], please visit [email or web address]           No text in SmartText           No text in SmartText

## (undated) NOTE — LETTER
November 29, 2020    [facility] [address] [place] [facility] [phone]      Dear Rose Callaway: The following are the results of your recent tests ordered by Jennifer South. Please review the list of test results.   Your result is the number on the left; w

## (undated) NOTE — Clinical Note
Please see if PA is needed for MRI of C-, L-spine and R shoulder. Please let patient know about this.